# Patient Record
Sex: FEMALE | Race: WHITE | Employment: OTHER | URBAN - METROPOLITAN AREA
[De-identification: names, ages, dates, MRNs, and addresses within clinical notes are randomized per-mention and may not be internally consistent; named-entity substitution may affect disease eponyms.]

---

## 2024-04-05 ENCOUNTER — APPOINTMENT (OUTPATIENT)
Age: 74
DRG: 720 | End: 2024-04-05
Payer: COMMERCIAL

## 2024-04-05 ENCOUNTER — HOSPITAL ENCOUNTER (INPATIENT)
Age: 74
LOS: 4 days | Discharge: HOME | DRG: 720 | End: 2024-04-09
Attending: EMERGENCY MEDICINE | Admitting: HOSPITALIST
Payer: COMMERCIAL

## 2024-04-05 DIAGNOSIS — R79.89 ELEVATED TROPONIN: ICD-10-CM

## 2024-04-05 DIAGNOSIS — G89.29 OTHER CHRONIC PAIN: ICD-10-CM

## 2024-04-05 DIAGNOSIS — E87.8 ELECTROLYTE DISTURBANCE: ICD-10-CM

## 2024-04-05 DIAGNOSIS — I48.91 ATRIAL FIBRILLATION WITH RAPID VENTRICULAR RESPONSE (HCC): ICD-10-CM

## 2024-04-05 DIAGNOSIS — R40.4 TRANSIENT ALTERATION OF AWARENESS: ICD-10-CM

## 2024-04-05 DIAGNOSIS — A41.9 SEPTICEMIA (HCC): Primary | ICD-10-CM

## 2024-04-05 DIAGNOSIS — R79.89 ELEVATED BRAIN NATRIURETIC PEPTIDE (BNP) LEVEL: ICD-10-CM

## 2024-04-05 LAB
ALBUMIN SERPL-MCNC: 4.8 G/DL (ref 3.4–5)
ALBUMIN/GLOB SERPL: 1.1 {RATIO}
ALP SERPL-CCNC: 60 U/L (ref 40–129)
ALP SERPL-CCNC: ABNORMAL U/L (ref 40–129)
ALT SERPL-CCNC: 15 U/L (ref 10–40)
ALT SERPL-CCNC: ABNORMAL U/L (ref 10–40)
AMPHET UR QL SCN: NEGATIVE
ANION GAP SERPL CALCULATED.3IONS-SCNC: 12 MMOL/L (ref 3–16)
ANION GAP SERPL CALCULATED.3IONS-SCNC: 15 MMOL/L (ref 3–16)
ANION GAP SERPL CALCULATED.3IONS-SCNC: 23 MMOL/L (ref 3–16)
AST SERPL-CCNC: 31 U/L (ref 15–37)
AST SERPL-CCNC: ABNORMAL U/L (ref 15–37)
BACTERIA URNS QL MICRO: ABNORMAL
BARBITURATES UR QL SCN: NEGATIVE
BASOPHILS # BLD: 0.01 K/UL (ref 0–0.2)
BASOPHILS NFR BLD: 0 %
BENZODIAZ UR QL: POSITIVE
BILIRUB SERPL-MCNC: 0.6 MG/DL (ref 0–1)
BILIRUB UR QL STRIP: ABNORMAL
BNP SERPL-MCNC: ABNORMAL PG/ML (ref 0–124)
BUN SERPL-MCNC: 18 MG/DL (ref 7–20)
BUN SERPL-MCNC: 24 MG/DL (ref 7–20)
BUN SERPL-MCNC: 24 MG/DL (ref 7–20)
CALCIUM SERPL-MCNC: 10.3 MG/DL (ref 8.3–10.6)
CALCIUM SERPL-MCNC: 5.8 MG/DL (ref 8.3–10.6)
CALCIUM SERPL-MCNC: 9.2 MG/DL (ref 8.3–10.6)
CANNABINOIDS UR QL SCN: NEGATIVE
CHARACTER UR: ABNORMAL
CHLORIDE SERPL-SCNC: 106 MMOL/L (ref 99–110)
CHLORIDE SERPL-SCNC: 81 MMOL/L (ref 99–110)
CHLORIDE SERPL-SCNC: 91 MMOL/L (ref 99–110)
CLARITY UR: CLEAR
CO2 SERPL-SCNC: 16 MMOL/L (ref 21–32)
CO2 SERPL-SCNC: 19 MMOL/L (ref 21–32)
CO2 SERPL-SCNC: 22 MMOL/L (ref 21–32)
COCAINE UR QL SCN: NEGATIVE
COHGB MFR BLD: 0.7 % (ref 0–1.5)
COLOR UR: YELLOW
CREAT SERPL-MCNC: 0.4 MG/DL (ref 0.6–1.2)
CREAT SERPL-MCNC: 0.8 MG/DL (ref 0.6–1.2)
CREAT SERPL-MCNC: 1.1 MG/DL (ref 0.6–1.2)
EOSINOPHIL # BLD: 0 K/UL (ref 0–0.6)
EOSINOPHILS RELATIVE PERCENT: 0 %
EPI CELLS #/AREA URNS HPF: ABNORMAL /HPF
ERYTHROCYTE [DISTWIDTH] IN BLOOD BY AUTOMATED COUNT: 13.8 % (ref 12.4–15.4)
ETHANOLAMINE SERPL-MCNC: NORMAL MG/DL
FENTANYL UR QL: NEGATIVE
FLUAV AG SPEC QL: NEGATIVE
FLUBV AG SPEC QL: NEGATIVE
GAS FLOW.O2 O2 DELIVERY SYS: ABNORMAL L/MIN
GFR SERPL CREATININE-BSD FRML MDRD: 54 ML/MIN/1.73M2
GFR SERPL CREATININE-BSD FRML MDRD: 81 ML/MIN/1.73M2
GFR SERPL CREATININE-BSD FRML MDRD: >90 ML/MIN/1.73M2
GLUCOSE BLD-MCNC: 253 MG/DL (ref 70–99)
GLUCOSE SERPL-MCNC: 181 MG/DL (ref 70–99)
GLUCOSE SERPL-MCNC: 218 MG/DL (ref 70–99)
GLUCOSE SERPL-MCNC: 235 MG/DL (ref 70–99)
GLUCOSE UR STRIP-MCNC: NEGATIVE MG/DL
HCO3 ARTERIAL: 21.3 MMOL/L (ref 21–29)
HCT VFR BLD AUTO: 49.7 % (ref 36–48)
HGB BLD-MCNC: 16.7 G/DL (ref 12–16)
HGB BLDMV-MCNC: 15.5 G/DL (ref 12–16)
HGB UR QL STRIP.AUTO: ABNORMAL
IMM GRANULOCYTES # BLD AUTO: 0.06 K/UL (ref 0–0.5)
IMM GRANULOCYTES NFR BLD: 0 %
INR PPP: 0.9 (ref 0.9–1.2)
KETONES UR STRIP-MCNC: NEGATIVE MG/DL
LACTATE BLDV-SCNC: 1.5 MMOL/L (ref 0.4–1.9)
LACTATE BLDV-SCNC: 2.6 MMOL/L (ref 0.4–1.9)
LACTATE BLDV-SCNC: 2.8 MMOL/L (ref 0.4–1.9)
LACTATE BLDV-SCNC: 3.9 MMOL/L (ref 0.4–1.9)
LEUKOCYTE ESTERASE UR QL STRIP: ABNORMAL
LIPASE SERPL-CCNC: 24 U/L (ref 13–60)
LYMPHOCYTES NFR BLD: 0.77 K/UL (ref 1–5.1)
LYMPHOCYTES RELATIVE PERCENT: 5 %
MAGNESIUM SERPL-MCNC: 1.2 MG/DL (ref 1.8–2.4)
MCH RBC QN AUTO: 27.9 PG (ref 26–34)
MCHC RBC AUTO-ENTMCNC: 33.6 G/DL (ref 31–36)
MCV RBC AUTO: 83.1 FL (ref 80–100)
METHADONE UR QL: NEGATIVE
METHEMOGLOBIN: 0.1 % (ref 0–1.4)
MONOCYTES NFR BLD: 0.71 K/UL (ref 0–1.3)
MONOCYTES NFR BLD: 5 %
NEGATIVE BASE EXCESS, ART: 2.6 MMOL/L
NEUTROPHILS NFR BLD: 90 %
NEUTS SEG NFR BLD: 13.37 K/UL (ref 1.7–7.7)
NITRITE UR QL STRIP: NEGATIVE
O2 SAT, ARTERIAL: 97 % (ref 93–100)
OPIATES UR QL SCN: NEGATIVE
OXYCODONE UR QL SCN: POSITIVE
PCO2 ARTERIAL: 34.4 MMHG (ref 35–45)
PCP UR QL SCN: NEGATIVE
PH ARTERIAL: 7.41 (ref 7.35–7.45)
PH UR STRIP: 5 [PH]
PH UR STRIP: 6 [PH] (ref 5–8)
PLATELET # BLD AUTO: 398 K/UL (ref 135–450)
PMV BLD AUTO: 9.5 FL
PO2 ARTERIAL: 94.7 MMHG (ref 75–108)
POTASSIUM SERPL-SCNC: 2.9 MMOL/L (ref 3.5–5.1)
POTASSIUM SERPL-SCNC: 4.2 MMOL/L (ref 3.5–5.1)
POTASSIUM SERPL-SCNC: 4.5 MMOL/L (ref 3.5–5.1)
POTASSIUM SERPL-SCNC: ABNORMAL MMOL/L (ref 3.5–5.1)
POTASSIUM SERPL-SCNC: NORMAL MMOL/L (ref 3.5–5.1)
PROCALCITONIN SERPL-MCNC: 0.14 NG/ML (ref 0–0.15)
PROT SERPL-MCNC: 9.3 G/DL (ref 6.4–8.2)
PROT UR STRIP-MCNC: >=300 MG/DL
PROTHROMBIN TIME: 12.2 SEC (ref 11.9–14.9)
RBC # BLD AUTO: 5.98 M/UL (ref 4–5.2)
RBC #/AREA URNS HPF: ABNORMAL /HPF
SARS-COV-2 RDRP RESP QL NAA+PROBE: NOT DETECTED
SODIUM SERPL-SCNC: 123 MMOL/L (ref 136–145)
SODIUM SERPL-SCNC: 127 MMOL/L (ref 136–145)
SODIUM SERPL-SCNC: 133 MMOL/L (ref 136–145)
SODIUM UR-SCNC: <20 MMOL/L
SP GR UR STRIP: >1.03 (ref 1–1.03)
SPECIMEN DESCRIPTION: NORMAL
TEST INFORMATION: ABNORMAL
TROPONIN I SERPL HS-MCNC: 124 NG/L (ref 0–14)
TROPONIN I SERPL HS-MCNC: 157 NG/L (ref 0–14)
TROPONIN I SERPL HS-MCNC: 74 NG/L (ref 0–14)
UROBILINOGEN UR STRIP-ACNC: 0.2 EU/DL (ref 0–1)
WBC #/AREA URNS HPF: ABNORMAL /HPF
WBC OTHER # BLD: 14.9 K/UL (ref 4–11)

## 2024-04-05 PROCEDURE — 96367 TX/PROPH/DG ADDL SEQ IV INF: CPT

## 2024-04-05 PROCEDURE — 76705 ECHO EXAM OF ABDOMEN: CPT

## 2024-04-05 PROCEDURE — A4216 STERILE WATER/SALINE, 10 ML: HCPCS | Performed by: HOSPITALIST

## 2024-04-05 PROCEDURE — 96375 TX/PRO/DX INJ NEW DRUG ADDON: CPT

## 2024-04-05 PROCEDURE — 6370000000 HC RX 637 (ALT 250 FOR IP): Performed by: EMERGENCY MEDICINE

## 2024-04-05 PROCEDURE — 83690 ASSAY OF LIPASE: CPT

## 2024-04-05 PROCEDURE — 94640 AIRWAY INHALATION TREATMENT: CPT

## 2024-04-05 PROCEDURE — 84450 TRANSFERASE (AST) (SGOT): CPT

## 2024-04-05 PROCEDURE — C9113 INJ PANTOPRAZOLE SODIUM, VIA: HCPCS | Performed by: HOSPITALIST

## 2024-04-05 PROCEDURE — 36415 COLL VENOUS BLD VENIPUNCTURE: CPT

## 2024-04-05 PROCEDURE — 6360000002 HC RX W HCPCS: Performed by: HOSPITALIST

## 2024-04-05 PROCEDURE — 94761 N-INVAS EAR/PLS OXIMETRY MLT: CPT

## 2024-04-05 PROCEDURE — 84132 ASSAY OF SERUM POTASSIUM: CPT

## 2024-04-05 PROCEDURE — 6370000000 HC RX 637 (ALT 250 FOR IP): Performed by: HOSPITALIST

## 2024-04-05 PROCEDURE — 84145 PROCALCITONIN (PCT): CPT

## 2024-04-05 PROCEDURE — 83605 ASSAY OF LACTIC ACID: CPT

## 2024-04-05 PROCEDURE — 83930 ASSAY OF BLOOD OSMOLALITY: CPT

## 2024-04-05 PROCEDURE — 84075 ASSAY ALKALINE PHOSPHATASE: CPT

## 2024-04-05 PROCEDURE — 84460 ALANINE AMINO (ALT) (SGPT): CPT

## 2024-04-05 PROCEDURE — 6360000002 HC RX W HCPCS: Performed by: INTERNAL MEDICINE

## 2024-04-05 PROCEDURE — 84484 ASSAY OF TROPONIN QUANT: CPT

## 2024-04-05 PROCEDURE — 84300 ASSAY OF URINE SODIUM: CPT

## 2024-04-05 PROCEDURE — 87077 CULTURE AEROBIC IDENTIFY: CPT

## 2024-04-05 PROCEDURE — 80048 BASIC METABOLIC PNL TOTAL CA: CPT

## 2024-04-05 PROCEDURE — 87635 SARS-COV-2 COVID-19 AMP PRB: CPT

## 2024-04-05 PROCEDURE — 70450 CT HEAD/BRAIN W/O DYE: CPT

## 2024-04-05 PROCEDURE — 87086 URINE CULTURE/COLONY COUNT: CPT

## 2024-04-05 PROCEDURE — 83880 ASSAY OF NATRIURETIC PEPTIDE: CPT

## 2024-04-05 PROCEDURE — 83935 ASSAY OF URINE OSMOLALITY: CPT

## 2024-04-05 PROCEDURE — 2580000003 HC RX 258: Performed by: EMERGENCY MEDICINE

## 2024-04-05 PROCEDURE — 36600 WITHDRAWAL OF ARTERIAL BLOOD: CPT

## 2024-04-05 PROCEDURE — 81001 URINALYSIS AUTO W/SCOPE: CPT

## 2024-04-05 PROCEDURE — 2000000000 HC ICU R&B

## 2024-04-05 PROCEDURE — 71045 X-RAY EXAM CHEST 1 VIEW: CPT

## 2024-04-05 PROCEDURE — 93005 ELECTROCARDIOGRAM TRACING: CPT | Performed by: EMERGENCY MEDICINE

## 2024-04-05 PROCEDURE — 87040 BLOOD CULTURE FOR BACTERIA: CPT

## 2024-04-05 PROCEDURE — 96365 THER/PROPH/DIAG IV INF INIT: CPT

## 2024-04-05 PROCEDURE — 99291 CRITICAL CARE FIRST HOUR: CPT

## 2024-04-05 PROCEDURE — 80307 DRUG TEST PRSMV CHEM ANLYZR: CPT

## 2024-04-05 PROCEDURE — G0480 DRUG TEST DEF 1-7 CLASSES: HCPCS

## 2024-04-05 PROCEDURE — 2500000003 HC RX 250 WO HCPCS: Performed by: EMERGENCY MEDICINE

## 2024-04-05 PROCEDURE — 83735 ASSAY OF MAGNESIUM: CPT

## 2024-04-05 PROCEDURE — 2580000003 HC RX 258: Performed by: HOSPITALIST

## 2024-04-05 PROCEDURE — 82805 BLOOD GASES W/O2 SATURATION: CPT

## 2024-04-05 PROCEDURE — 2700000000 HC OXYGEN THERAPY PER DAY

## 2024-04-05 PROCEDURE — 82962 GLUCOSE BLOOD TEST: CPT

## 2024-04-05 PROCEDURE — 87804 INFLUENZA ASSAY W/OPTIC: CPT

## 2024-04-05 PROCEDURE — 93005 ELECTROCARDIOGRAM TRACING: CPT | Performed by: HOSPITALIST

## 2024-04-05 PROCEDURE — 6360000002 HC RX W HCPCS: Performed by: EMERGENCY MEDICINE

## 2024-04-05 PROCEDURE — 85025 COMPLETE CBC W/AUTO DIFF WBC: CPT

## 2024-04-05 PROCEDURE — 74177 CT ABD & PELVIS W/CONTRAST: CPT

## 2024-04-05 PROCEDURE — 6360000004 HC RX CONTRAST MEDICATION: Performed by: EMERGENCY MEDICINE

## 2024-04-05 PROCEDURE — 99223 1ST HOSP IP/OBS HIGH 75: CPT | Performed by: INTERNAL MEDICINE

## 2024-04-05 PROCEDURE — 85610 PROTHROMBIN TIME: CPT

## 2024-04-05 PROCEDURE — 80053 COMPREHEN METABOLIC PANEL: CPT

## 2024-04-05 RX ORDER — ASPIRIN 81 MG/1
81 TABLET ORAL DAILY
Status: DISCONTINUED | OUTPATIENT
Start: 2024-04-06 | End: 2024-04-09 | Stop reason: HOSPADM

## 2024-04-05 RX ORDER — ACETAMINOPHEN 325 MG/1
650 TABLET ORAL EVERY 6 HOURS PRN
Status: DISCONTINUED | OUTPATIENT
Start: 2024-04-05 | End: 2024-04-09 | Stop reason: HOSPADM

## 2024-04-05 RX ORDER — ONDANSETRON 4 MG/1
4 TABLET, ORALLY DISINTEGRATING ORAL EVERY 8 HOURS PRN
Status: DISCONTINUED | OUTPATIENT
Start: 2024-04-05 | End: 2024-04-09 | Stop reason: HOSPADM

## 2024-04-05 RX ORDER — LORAZEPAM 2 MG/ML
2 INJECTION INTRAMUSCULAR
Status: DISCONTINUED | OUTPATIENT
Start: 2024-04-05 | End: 2024-04-09 | Stop reason: HOSPADM

## 2024-04-05 RX ORDER — LISINOPRIL 10 MG/1
10 TABLET ORAL DAILY
Status: ON HOLD | COMMUNITY
End: 2024-04-08 | Stop reason: HOSPADM

## 2024-04-05 RX ORDER — LEVETIRACETAM 500 MG/1
500 TABLET ORAL 2 TIMES DAILY
Status: ON HOLD | COMMUNITY
End: 2024-04-08 | Stop reason: HOSPADM

## 2024-04-05 RX ORDER — CLONAZEPAM 0.5 MG/1
1 TABLET ORAL EVERY 8 HOURS
Status: DISCONTINUED | OUTPATIENT
Start: 2024-04-05 | End: 2024-04-05

## 2024-04-05 RX ORDER — ACETAMINOPHEN 650 MG/1
650 SUPPOSITORY RECTAL EVERY 6 HOURS PRN
Status: DISCONTINUED | OUTPATIENT
Start: 2024-04-05 | End: 2024-04-09 | Stop reason: HOSPADM

## 2024-04-05 RX ORDER — HYDROXYZINE HYDROCHLORIDE 25 MG/1
25 TABLET, FILM COATED ORAL 3 TIMES DAILY PRN
Status: ON HOLD | COMMUNITY
End: 2024-04-08 | Stop reason: HOSPADM

## 2024-04-05 RX ORDER — FERROUS SULFATE 325(65) MG
325 TABLET ORAL
Status: ON HOLD | COMMUNITY
End: 2024-04-08 | Stop reason: HOSPADM

## 2024-04-05 RX ORDER — POTASSIUM CHLORIDE 7.45 MG/ML
10 INJECTION INTRAVENOUS PRN
Status: DISCONTINUED | OUTPATIENT
Start: 2024-04-05 | End: 2024-04-09 | Stop reason: HOSPADM

## 2024-04-05 RX ORDER — SODIUM CHLORIDE 0.9 % (FLUSH) 0.9 %
5-40 SYRINGE (ML) INJECTION EVERY 12 HOURS SCHEDULED
Status: DISCONTINUED | OUTPATIENT
Start: 2024-04-05 | End: 2024-04-05

## 2024-04-05 RX ORDER — DILTIAZEM HYDROCHLORIDE 5 MG/ML
20 INJECTION INTRAVENOUS ONCE
Status: COMPLETED | OUTPATIENT
Start: 2024-04-05 | End: 2024-04-05

## 2024-04-05 RX ORDER — DEXTROSE MONOHYDRATE 100 MG/ML
INJECTION, SOLUTION INTRAVENOUS CONTINUOUS PRN
Status: DISCONTINUED | OUTPATIENT
Start: 2024-04-05 | End: 2024-04-09 | Stop reason: HOSPADM

## 2024-04-05 RX ORDER — BUPROPION HYDROCHLORIDE 150 MG/1
150 TABLET, EXTENDED RELEASE ORAL 2 TIMES DAILY
COMMUNITY

## 2024-04-05 RX ORDER — LORAZEPAM 1 MG/1
3 TABLET ORAL
Status: DISCONTINUED | OUTPATIENT
Start: 2024-04-05 | End: 2024-04-09 | Stop reason: HOSPADM

## 2024-04-05 RX ORDER — LORAZEPAM 1 MG/1
2 TABLET ORAL
Status: DISCONTINUED | OUTPATIENT
Start: 2024-04-05 | End: 2024-04-09 | Stop reason: HOSPADM

## 2024-04-05 RX ORDER — SODIUM CHLORIDE 0.9 % (FLUSH) 0.9 %
5-40 SYRINGE (ML) INJECTION EVERY 12 HOURS SCHEDULED
Status: DISCONTINUED | OUTPATIENT
Start: 2024-04-05 | End: 2024-04-05 | Stop reason: SDUPTHER

## 2024-04-05 RX ORDER — DILTIAZEM HYDROCHLORIDE 180 MG/1
180 CAPSULE, EXTENDED RELEASE ORAL DAILY
Status: ON HOLD | COMMUNITY
End: 2024-04-08 | Stop reason: HOSPADM

## 2024-04-05 RX ORDER — METOPROLOL TARTRATE 50 MG/1
50 TABLET, FILM COATED ORAL 2 TIMES DAILY
Status: ON HOLD | COMMUNITY
End: 2024-04-08

## 2024-04-05 RX ORDER — LEVETIRACETAM 500 MG/1
500 TABLET ORAL 2 TIMES DAILY
Status: DISCONTINUED | OUTPATIENT
Start: 2024-04-05 | End: 2024-04-09 | Stop reason: HOSPADM

## 2024-04-05 RX ORDER — DULOXETIN HYDROCHLORIDE 30 MG/1
30 CAPSULE, DELAYED RELEASE ORAL 2 TIMES DAILY
Status: ON HOLD | COMMUNITY
End: 2024-04-08 | Stop reason: HOSPADM

## 2024-04-05 RX ORDER — THIAMINE HYDROCHLORIDE 100 MG/ML
100 INJECTION, SOLUTION INTRAMUSCULAR; INTRAVENOUS ONCE
Status: COMPLETED | OUTPATIENT
Start: 2024-04-05 | End: 2024-04-05

## 2024-04-05 RX ORDER — 0.9 % SODIUM CHLORIDE 0.9 %
1000 INTRAVENOUS SOLUTION INTRAVENOUS ONCE
Status: DISCONTINUED | OUTPATIENT
Start: 2024-04-05 | End: 2024-04-05

## 2024-04-05 RX ORDER — ALENDRONATE SODIUM 70 MG/1
70 TABLET ORAL
Status: ON HOLD | COMMUNITY
End: 2024-04-08 | Stop reason: HOSPADM

## 2024-04-05 RX ORDER — SODIUM CHLORIDE 9 MG/ML
INJECTION, SOLUTION INTRAVENOUS CONTINUOUS
Status: DISCONTINUED | OUTPATIENT
Start: 2024-04-05 | End: 2024-04-05

## 2024-04-05 RX ORDER — DILTIAZEM HYDROCHLORIDE 180 MG/1
180 CAPSULE, COATED, EXTENDED RELEASE ORAL DAILY
Status: DISCONTINUED | OUTPATIENT
Start: 2024-04-05 | End: 2024-04-06

## 2024-04-05 RX ORDER — PANTOPRAZOLE SODIUM 40 MG/1
40 TABLET, DELAYED RELEASE ORAL DAILY
COMMUNITY

## 2024-04-05 RX ORDER — ALBUTEROL SULFATE 90 UG/1
2 AEROSOL, METERED RESPIRATORY (INHALATION) EVERY 6 HOURS PRN
Status: DISCONTINUED | OUTPATIENT
Start: 2024-04-05 | End: 2024-04-09 | Stop reason: HOSPADM

## 2024-04-05 RX ORDER — DULOXETIN HYDROCHLORIDE 30 MG/1
30 CAPSULE, DELAYED RELEASE ORAL 2 TIMES DAILY
Status: DISCONTINUED | OUTPATIENT
Start: 2024-04-05 | End: 2024-04-06

## 2024-04-05 RX ORDER — LORAZEPAM 1 MG/1
4 TABLET ORAL
Status: DISCONTINUED | OUTPATIENT
Start: 2024-04-05 | End: 2024-04-09 | Stop reason: HOSPADM

## 2024-04-05 RX ORDER — TIOTROPIUM BROMIDE 18 UG/1
18 CAPSULE ORAL; RESPIRATORY (INHALATION) DAILY
COMMUNITY

## 2024-04-05 RX ORDER — ASPIRIN 81 MG/1
81 TABLET ORAL DAILY
COMMUNITY

## 2024-04-05 RX ORDER — ONDANSETRON 2 MG/ML
4 INJECTION INTRAMUSCULAR; INTRAVENOUS EVERY 6 HOURS PRN
Status: DISCONTINUED | OUTPATIENT
Start: 2024-04-05 | End: 2024-04-09 | Stop reason: HOSPADM

## 2024-04-05 RX ORDER — ROPINIROLE 0.25 MG/1
0.25 TABLET, FILM COATED ORAL 2 TIMES DAILY
Status: ON HOLD | COMMUNITY
End: 2024-04-08 | Stop reason: HOSPADM

## 2024-04-05 RX ORDER — GUAIFENESIN 600 MG/1
1200 TABLET, EXTENDED RELEASE ORAL 2 TIMES DAILY
Status: ON HOLD | COMMUNITY
End: 2024-04-08 | Stop reason: HOSPADM

## 2024-04-05 RX ORDER — MAGNESIUM HYDROXIDE/ALUMINUM HYDROXICE/SIMETHICONE 120; 1200; 1200 MG/30ML; MG/30ML; MG/30ML
30 SUSPENSION ORAL EVERY 6 HOURS PRN
Status: DISCONTINUED | OUTPATIENT
Start: 2024-04-05 | End: 2024-04-09 | Stop reason: HOSPADM

## 2024-04-05 RX ORDER — OXYCODONE HYDROCHLORIDE 5 MG/1
5 TABLET ORAL EVERY 4 HOURS PRN
Status: DISCONTINUED | OUTPATIENT
Start: 2024-04-05 | End: 2024-04-09 | Stop reason: HOSPADM

## 2024-04-05 RX ORDER — 0.9 % SODIUM CHLORIDE 0.9 %
30 INTRAVENOUS SOLUTION INTRAVENOUS ONCE
Status: DISCONTINUED | OUTPATIENT
Start: 2024-04-05 | End: 2024-04-05

## 2024-04-05 RX ORDER — CLONAZEPAM 1 MG/1
1 TABLET ORAL EVERY 8 HOURS
Status: DISCONTINUED | OUTPATIENT
Start: 2024-04-05 | End: 2024-04-09 | Stop reason: HOSPADM

## 2024-04-05 RX ORDER — IPRATROPIUM BROMIDE AND ALBUTEROL SULFATE 2.5; .5 MG/3ML; MG/3ML
1 SOLUTION RESPIRATORY (INHALATION)
COMMUNITY
End: 2024-04-05

## 2024-04-05 RX ORDER — DILTIAZEM HYDROCHLORIDE 180 MG/1
180 CAPSULE, EXTENDED RELEASE ORAL DAILY
Status: DISCONTINUED | OUTPATIENT
Start: 2024-04-05 | End: 2024-04-05 | Stop reason: RX

## 2024-04-05 RX ORDER — ROSUVASTATIN CALCIUM 40 MG/1
40 TABLET, COATED ORAL NIGHTLY
Status: DISCONTINUED | OUTPATIENT
Start: 2024-04-05 | End: 2024-04-09 | Stop reason: HOSPADM

## 2024-04-05 RX ORDER — MAGNESIUM SULFATE IN WATER 40 MG/ML
2000 INJECTION, SOLUTION INTRAVENOUS PRN
Status: DISCONTINUED | OUTPATIENT
Start: 2024-04-05 | End: 2024-04-09 | Stop reason: HOSPADM

## 2024-04-05 RX ORDER — SODIUM CHLORIDE 9 MG/ML
INJECTION, SOLUTION INTRAVENOUS PRN
Status: DISCONTINUED | OUTPATIENT
Start: 2024-04-05 | End: 2024-04-05 | Stop reason: SDUPTHER

## 2024-04-05 RX ORDER — LISINOPRIL 10 MG/1
10 TABLET ORAL DAILY
Status: DISCONTINUED | OUTPATIENT
Start: 2024-04-06 | End: 2024-04-06

## 2024-04-05 RX ORDER — ERGOCALCIFEROL 1.25 MG/1
50000 CAPSULE ORAL WEEKLY
COMMUNITY

## 2024-04-05 RX ORDER — SODIUM CHLORIDE 0.9 % (FLUSH) 0.9 %
5-40 SYRINGE (ML) INJECTION PRN
Status: DISCONTINUED | OUTPATIENT
Start: 2024-04-05 | End: 2024-04-09 | Stop reason: HOSPADM

## 2024-04-05 RX ORDER — POTASSIUM CHLORIDE 20 MEQ/1
40 TABLET, EXTENDED RELEASE ORAL PRN
Status: DISCONTINUED | OUTPATIENT
Start: 2024-04-05 | End: 2024-04-09 | Stop reason: HOSPADM

## 2024-04-05 RX ORDER — METOPROLOL TARTRATE 50 MG/1
50 TABLET, FILM COATED ORAL 2 TIMES DAILY
Status: DISCONTINUED | OUTPATIENT
Start: 2024-04-05 | End: 2024-04-06

## 2024-04-05 RX ORDER — ACETAMINOPHEN 325 MG/1
650 TABLET ORAL EVERY 6 HOURS PRN
Status: DISCONTINUED | OUTPATIENT
Start: 2024-04-05 | End: 2024-04-05 | Stop reason: SDUPTHER

## 2024-04-05 RX ORDER — LANOLIN ALCOHOL/MO/W.PET/CERES
3 CREAM (GRAM) TOPICAL NIGHTLY PRN
Status: DISCONTINUED | OUTPATIENT
Start: 2024-04-05 | End: 2024-04-09 | Stop reason: HOSPADM

## 2024-04-05 RX ORDER — MAGNESIUM SULFATE IN WATER 40 MG/ML
2000 INJECTION, SOLUTION INTRAVENOUS ONCE
Status: COMPLETED | OUTPATIENT
Start: 2024-04-05 | End: 2024-04-05

## 2024-04-05 RX ORDER — LORAZEPAM 2 MG/ML
1 INJECTION INTRAMUSCULAR
Status: DISCONTINUED | OUTPATIENT
Start: 2024-04-05 | End: 2024-04-09 | Stop reason: HOSPADM

## 2024-04-05 RX ORDER — SODIUM CHLORIDE 0.9 % (FLUSH) 0.9 %
5-40 SYRINGE (ML) INJECTION PRN
Status: DISCONTINUED | OUTPATIENT
Start: 2024-04-05 | End: 2024-04-05 | Stop reason: SDUPTHER

## 2024-04-05 RX ORDER — 0.9 % SODIUM CHLORIDE 0.9 %
15 INTRAVENOUS SOLUTION INTRAVENOUS ONCE
Status: DISCONTINUED | OUTPATIENT
Start: 2024-04-05 | End: 2024-04-05

## 2024-04-05 RX ORDER — GLUCAGON 1 MG/ML
1 KIT INJECTION PRN
Status: DISCONTINUED | OUTPATIENT
Start: 2024-04-05 | End: 2024-04-09 | Stop reason: HOSPADM

## 2024-04-05 RX ORDER — SENNOSIDES A AND B 8.6 MG/1
1 TABLET, FILM COATED ORAL 2 TIMES DAILY PRN
Status: DISCONTINUED | OUTPATIENT
Start: 2024-04-05 | End: 2024-04-09 | Stop reason: HOSPADM

## 2024-04-05 RX ORDER — GABAPENTIN 300 MG/1
300 CAPSULE ORAL EVERY MORNING
Status: DISCONTINUED | OUTPATIENT
Start: 2024-04-06 | End: 2024-04-09 | Stop reason: HOSPADM

## 2024-04-05 RX ORDER — DONEPEZIL HYDROCHLORIDE 5 MG/1
5 TABLET, FILM COATED ORAL NIGHTLY
Status: ON HOLD | COMMUNITY
End: 2024-04-08 | Stop reason: HOSPADM

## 2024-04-05 RX ORDER — LANOLIN ALCOHOL/MO/W.PET/CERES
400 CREAM (GRAM) TOPICAL DAILY
Status: ON HOLD | COMMUNITY
End: 2024-04-08 | Stop reason: HOSPADM

## 2024-04-05 RX ORDER — DIGOXIN 125 MCG
125 TABLET ORAL EVERY OTHER DAY
Status: DISCONTINUED | OUTPATIENT
Start: 2024-04-05 | End: 2024-04-06

## 2024-04-05 RX ORDER — BUTALBITAL/ACETAMINOPHEN 50MG-325MG
1 TABLET ORAL EVERY 4 HOURS PRN
COMMUNITY

## 2024-04-05 RX ORDER — OXYCODONE HYDROCHLORIDE AND ACETAMINOPHEN 5; 325 MG/1; MG/1
1 TABLET ORAL EVERY 8 HOURS PRN
Status: ON HOLD | COMMUNITY
End: 2024-04-08 | Stop reason: HOSPADM

## 2024-04-05 RX ORDER — INSULIN GLARGINE 100 [IU]/ML
34 INJECTION, SOLUTION SUBCUTANEOUS DAILY
COMMUNITY

## 2024-04-05 RX ORDER — ROSUVASTATIN CALCIUM 40 MG/1
40 TABLET, COATED ORAL EVERY EVENING
COMMUNITY

## 2024-04-05 RX ORDER — SODIUM CHLORIDE 0.9 % (FLUSH) 0.9 %
5-40 SYRINGE (ML) INJECTION EVERY 12 HOURS SCHEDULED
Status: DISCONTINUED | OUTPATIENT
Start: 2024-04-05 | End: 2024-04-09 | Stop reason: HOSPADM

## 2024-04-05 RX ORDER — INSULIN GLARGINE 100 [IU]/ML
34 INJECTION, SOLUTION SUBCUTANEOUS DAILY
Status: DISCONTINUED | OUTPATIENT
Start: 2024-04-05 | End: 2024-04-09 | Stop reason: HOSPADM

## 2024-04-05 RX ORDER — LISINOPRIL 10 MG/1
10 TABLET ORAL DAILY
Status: DISCONTINUED | OUTPATIENT
Start: 2024-04-05 | End: 2024-04-05

## 2024-04-05 RX ORDER — METHYLPREDNISOLONE SODIUM SUCCINATE 125 MG/2ML
80 INJECTION, POWDER, LYOPHILIZED, FOR SOLUTION INTRAMUSCULAR; INTRAVENOUS ONCE
Status: COMPLETED | OUTPATIENT
Start: 2024-04-05 | End: 2024-04-05

## 2024-04-05 RX ORDER — CLONAZEPAM 1 MG/1
1 TABLET ORAL 3 TIMES DAILY PRN
COMMUNITY

## 2024-04-05 RX ORDER — PANTOPRAZOLE SODIUM 40 MG/1
40 TABLET, DELAYED RELEASE ORAL DAILY
Status: DISCONTINUED | OUTPATIENT
Start: 2024-04-06 | End: 2024-04-05

## 2024-04-05 RX ORDER — LORAZEPAM 1 MG/1
1 TABLET ORAL
Status: DISCONTINUED | OUTPATIENT
Start: 2024-04-05 | End: 2024-04-09 | Stop reason: HOSPADM

## 2024-04-05 RX ORDER — IPRATROPIUM BROMIDE AND ALBUTEROL SULFATE 2.5; .5 MG/3ML; MG/3ML
1 SOLUTION RESPIRATORY (INHALATION) ONCE
Status: COMPLETED | OUTPATIENT
Start: 2024-04-05 | End: 2024-04-05

## 2024-04-05 RX ORDER — M-VIT,TX,IRON,MINS/CALC/FOLIC 27MG-0.4MG
1 TABLET ORAL DAILY
Status: DISCONTINUED | OUTPATIENT
Start: 2024-04-06 | End: 2024-04-09 | Stop reason: HOSPADM

## 2024-04-05 RX ORDER — ROPINIROLE 0.25 MG/1
0.25 TABLET, FILM COATED ORAL 2 TIMES DAILY
Status: DISCONTINUED | OUTPATIENT
Start: 2024-04-05 | End: 2024-04-05

## 2024-04-05 RX ORDER — METFORMIN HYDROCHLORIDE 500 MG/1
500 TABLET, EXTENDED RELEASE ORAL 2 TIMES DAILY
COMMUNITY

## 2024-04-05 RX ORDER — ENOXAPARIN SODIUM 100 MG/ML
40 INJECTION SUBCUTANEOUS DAILY
Status: DISCONTINUED | OUTPATIENT
Start: 2024-04-05 | End: 2024-04-09 | Stop reason: HOSPADM

## 2024-04-05 RX ORDER — GABAPENTIN 600 MG/1
600 TABLET ORAL EVERY EVENING
COMMUNITY

## 2024-04-05 RX ORDER — POLYETHYLENE GLYCOL 3350 17 G/17G
17 POWDER, FOR SOLUTION ORAL DAILY
Status: DISCONTINUED | OUTPATIENT
Start: 2024-04-05 | End: 2024-04-09 | Stop reason: HOSPADM

## 2024-04-05 RX ORDER — DIGOXIN 125 MCG
125 TABLET ORAL EVERY OTHER DAY
Status: ON HOLD | COMMUNITY
End: 2024-04-08 | Stop reason: HOSPADM

## 2024-04-05 RX ORDER — GABAPENTIN 300 MG/1
300 CAPSULE ORAL EVERY MORNING
COMMUNITY

## 2024-04-05 RX ORDER — LORAZEPAM 2 MG/ML
3 INJECTION INTRAMUSCULAR
Status: DISCONTINUED | OUTPATIENT
Start: 2024-04-05 | End: 2024-04-09 | Stop reason: HOSPADM

## 2024-04-05 RX ORDER — BUPROPION HYDROCHLORIDE 150 MG/1
150 TABLET, EXTENDED RELEASE ORAL 2 TIMES DAILY
Status: DISCONTINUED | OUTPATIENT
Start: 2024-04-05 | End: 2024-04-09 | Stop reason: HOSPADM

## 2024-04-05 RX ORDER — ALBUTEROL SULFATE 90 UG/1
2 AEROSOL, METERED RESPIRATORY (INHALATION) EVERY 6 HOURS PRN
COMMUNITY

## 2024-04-05 RX ORDER — IPRATROPIUM BROMIDE AND ALBUTEROL SULFATE 2.5; .5 MG/3ML; MG/3ML
1 SOLUTION RESPIRATORY (INHALATION) EVERY 4 HOURS PRN
Status: DISCONTINUED | OUTPATIENT
Start: 2024-04-05 | End: 2024-04-09 | Stop reason: HOSPADM

## 2024-04-05 RX ORDER — M-VIT,TX,IRON,MINS/CALC/FOLIC 27MG-0.4MG
1 TABLET ORAL DAILY
COMMUNITY

## 2024-04-05 RX ORDER — SODIUM CHLORIDE 9 MG/ML
INJECTION, SOLUTION INTRAVENOUS PRN
Status: DISCONTINUED | OUTPATIENT
Start: 2024-04-05 | End: 2024-04-09 | Stop reason: HOSPADM

## 2024-04-05 RX ORDER — LANOLIN ALCOHOL/MO/W.PET/CERES
100 CREAM (GRAM) TOPICAL DAILY
Status: DISCONTINUED | OUTPATIENT
Start: 2024-04-05 | End: 2024-04-05

## 2024-04-05 RX ORDER — LORAZEPAM 2 MG/ML
4 INJECTION INTRAMUSCULAR
Status: DISCONTINUED | OUTPATIENT
Start: 2024-04-05 | End: 2024-04-09 | Stop reason: HOSPADM

## 2024-04-05 RX ORDER — ACETAMINOPHEN 650 MG/1
650 SUPPOSITORY RECTAL EVERY 6 HOURS PRN
Status: DISCONTINUED | OUTPATIENT
Start: 2024-04-05 | End: 2024-04-05 | Stop reason: SDUPTHER

## 2024-04-05 RX ORDER — INSULIN LISPRO 100 [IU]/ML
INJECTION, SOLUTION INTRAVENOUS; SUBCUTANEOUS
COMMUNITY
End: 2024-04-05

## 2024-04-05 RX ORDER — GABAPENTIN 300 MG/1
600 CAPSULE ORAL EVERY EVENING
Status: DISCONTINUED | OUTPATIENT
Start: 2024-04-05 | End: 2024-04-09 | Stop reason: HOSPADM

## 2024-04-05 RX ORDER — LANOLIN ALCOHOL/MO/W.PET/CERES
200 CREAM (GRAM) TOPICAL DAILY
Status: DISCONTINUED | OUTPATIENT
Start: 2024-04-06 | End: 2024-04-09 | Stop reason: HOSPADM

## 2024-04-05 RX ADMIN — GABAPENTIN 600 MG: 300 CAPSULE ORAL at 18:39

## 2024-04-05 RX ADMIN — SODIUM CHLORIDE, PRESERVATIVE FREE 10 ML: 5 INJECTION INTRAVENOUS at 20:56

## 2024-04-05 RX ADMIN — MAGNESIUM SULFATE HEPTAHYDRATE 2000 MG: 40 INJECTION, SOLUTION INTRAVENOUS at 17:35

## 2024-04-05 RX ADMIN — INSULIN GLARGINE 34 UNITS: 100 INJECTION, SOLUTION SUBCUTANEOUS at 21:39

## 2024-04-05 RX ADMIN — SODIUM CHLORIDE: 9 INJECTION, SOLUTION INTRAVENOUS at 17:25

## 2024-04-05 RX ADMIN — CEFEPIME 2000 MG: 2 INJECTION, POWDER, FOR SOLUTION INTRAVENOUS at 13:58

## 2024-04-05 RX ADMIN — LEVETIRACETAM 500 MG: 500 TABLET, FILM COATED ORAL at 20:42

## 2024-04-05 RX ADMIN — DILTIAZEM HYDROCHLORIDE 180 MG: 180 CAPSULE, COATED, EXTENDED RELEASE ORAL at 20:55

## 2024-04-05 RX ADMIN — Medication 100 MG: at 18:39

## 2024-04-05 RX ADMIN — METHYLPREDNISOLONE SODIUM SUCCINATE 80 MG: 125 INJECTION INTRAMUSCULAR; INTRAVENOUS at 13:55

## 2024-04-05 RX ADMIN — IPRATROPIUM BROMIDE AND ALBUTEROL SULFATE 1 DOSE: 2.5; .5 SOLUTION RESPIRATORY (INHALATION) at 13:21

## 2024-04-05 RX ADMIN — METOPROLOL TARTRATE 50 MG: 50 TABLET ORAL at 20:42

## 2024-04-05 RX ADMIN — DULOXETINE HYDROCHLORIDE 30 MG: 30 CAPSULE, DELAYED RELEASE ORAL at 20:42

## 2024-04-05 RX ADMIN — IOPAMIDOL 75 ML: 755 INJECTION, SOLUTION INTRAVENOUS at 14:40

## 2024-04-05 RX ADMIN — DILTIAZEM HYDROCHLORIDE 20 MG: 5 INJECTION, SOLUTION INTRAVENOUS at 13:55

## 2024-04-05 RX ADMIN — DIGOXIN 125 MCG: 125 TABLET ORAL at 20:55

## 2024-04-05 RX ADMIN — BUPROPION HYDROCHLORIDE 150 MG: 150 TABLET, EXTENDED RELEASE ORAL at 20:42

## 2024-04-05 RX ADMIN — CLONAZEPAM 1 MG: 1 TABLET ORAL at 20:26

## 2024-04-05 RX ADMIN — ROSUVASTATIN 40 MG: 40 TABLET, FILM COATED ORAL at 20:42

## 2024-04-05 RX ADMIN — ENOXAPARIN SODIUM 40 MG: 100 INJECTION SUBCUTANEOUS at 18:39

## 2024-04-05 RX ADMIN — POLYETHYLENE GLYCOL 3350 17 G: 17 POWDER, FOR SOLUTION ORAL at 18:40

## 2024-04-05 RX ADMIN — CEFTRIAXONE SODIUM 2000 MG: 2 INJECTION, POWDER, FOR SOLUTION INTRAMUSCULAR; INTRAVENOUS at 21:00

## 2024-04-05 RX ADMIN — PANTOPRAZOLE SODIUM 40 MG: 40 INJECTION, POWDER, FOR SOLUTION INTRAVENOUS at 20:56

## 2024-04-05 RX ADMIN — VANCOMYCIN HYDROCHLORIDE 1500 MG: 1.5 INJECTION, POWDER, LYOPHILIZED, FOR SOLUTION INTRAVENOUS at 14:56

## 2024-04-05 RX ADMIN — THIAMINE HYDROCHLORIDE 100 MG: 100 INJECTION, SOLUTION INTRAMUSCULAR; INTRAVENOUS at 20:42

## 2024-04-05 RX ADMIN — OXYCODONE 5 MG: 5 TABLET ORAL at 17:29

## 2024-04-05 ASSESSMENT — PAIN SCALES - GENERAL
PAINLEVEL_OUTOF10: 10
PAINLEVEL_OUTOF10: 10
PAINLEVEL_OUTOF10: 8
PAINLEVEL_OUTOF10: 0

## 2024-04-05 ASSESSMENT — PAIN DESCRIPTION - PAIN TYPE
TYPE: ACUTE PAIN
TYPE: ACUTE PAIN

## 2024-04-05 ASSESSMENT — PAIN SCALES - WONG BAKER
WONGBAKER_NUMERICALRESPONSE: NO HURT
WONGBAKER_NUMERICALRESPONSE: HURTS A LITTLE BIT
WONGBAKER_NUMERICALRESPONSE: NO HURT

## 2024-04-05 ASSESSMENT — PAIN DESCRIPTION - ONSET
ONSET: ON-GOING
ONSET: ON-GOING

## 2024-04-05 ASSESSMENT — PAIN DESCRIPTION - LOCATION
LOCATION: GENERALIZED
LOCATION_2: NECK
LOCATION: ABDOMEN
LOCATION: ABDOMEN

## 2024-04-05 ASSESSMENT — PAIN DESCRIPTION - DESCRIPTORS
DESCRIPTORS: PRESSURE
DESCRIPTORS: DISCOMFORT
DESCRIPTORS_2: ACHING
DESCRIPTORS: PRESSURE

## 2024-04-05 ASSESSMENT — LIFESTYLE VARIABLES
HOW OFTEN DO YOU HAVE A DRINK CONTAINING ALCOHOL: PATIENT DECLINED
HOW MANY STANDARD DRINKS CONTAINING ALCOHOL DO YOU HAVE ON A TYPICAL DAY: PATIENT DECLINED

## 2024-04-05 ASSESSMENT — PAIN - FUNCTIONAL ASSESSMENT
PAIN_FUNCTIONAL_ASSESSMENT: PREVENTS OR INTERFERES SOME ACTIVE ACTIVITIES AND ADLS
PAIN_FUNCTIONAL_ASSESSMENT: PREVENTS OR INTERFERES SOME ACTIVE ACTIVITIES AND ADLS
PAIN_FUNCTIONAL_ASSESSMENT_SITE2: PREVENTS OR INTERFERES SOME ACTIVE ACTIVITIES AND ADLS
PAIN_FUNCTIONAL_ASSESSMENT: 0-10

## 2024-04-05 ASSESSMENT — PAIN DESCRIPTION - FREQUENCY
FREQUENCY: CONTINUOUS
FREQUENCY: CONTINUOUS

## 2024-04-05 ASSESSMENT — PAIN DESCRIPTION - ORIENTATION
ORIENTATION: MID
ORIENTATION_2: POSTERIOR
ORIENTATION: MID

## 2024-04-05 ASSESSMENT — PAIN DESCRIPTION - INTENSITY: RATING_2: 10

## 2024-04-05 NOTE — ED NOTES
Attempted to perform SBIRT screening. Pt states \"I do drink alcohol and I know I drink too much. It got worse because I haven't been taking my anxiety medications and I am working on getting them adjusted. I honestly do not remember how much I drink.\"  
ESTRADA Hadely at bedside drawing recollect of K+. Specimen obtained and labeled appropriately and sent to lab immediately.   
Lab called and states the repeat green top blood was hemolyzed and specimen is rejected. Lab states they will come to department to draw the green top tube for the potassium level.    
Phlebotomist at bedside to obtain recollect K+ and repeat lactic.   
Pts family member She at bedside.   
RN spoke to pts family member at providers request to determine pts wishes and to help direct plan of care. Spoke to She. She informed RN that the pt does wish to be a full code and she is on her way to [facility] at this time.   
Received call from lab that K+ was hemolyzed and needed to be recollected. Lab also informed RN that they are rejecting ABG d/t specimen not being labeled. RN informed lab that the specimen was labeled and SHAZIA Torres placed the label on the specimen and it was sent to the lab appropriately. RN also informed lab that RN was at bedside when the specimen was drawn by RT and that the specimen needs to be ran to prevent another arterial puncture. Lab verbalized understanding and agreed to run the specimen accordingly, but informed RN that the K+ needs to be recollected.   
Received call from lab that the recollected K+ was hemolyzed and needs to be recollected again. RN informed lab that a phlebotomist needs to be sent to the ED to obtain the K+.  
US at bedside.   
Gravity, UA >1.030 (*)     Urine Hgb SMALL (*)     Protein, UA >=300 (*)     Leukocyte Esterase, Urine TRACE (*)     All other components within normal limits   MAGNESIUM - Abnormal; Notable for the following components:    Magnesium 1.2 (*)     All other components within normal limits   MICROSCOPIC URINALYSIS - Abnormal; Notable for the following components:    WBC, UA 21 TO 50 (*)     RBC, UA 10 TO 20 (*)     Bacteria, UA 3+ (*)     Other Observations UA Urine Reflexed to Culture (*)     All other components within normal limits       Background  Allergies:   Allergies   Allergen Reactions    Sulfa Antibiotics Anxiety     History:   Past Medical History:   Diagnosis Date    A-fib ([facility])     COPD (chronic obstructive pulmonary disease) ([facility])     Diabetes mellitus ([facility])     Hypertension        Assessment  Vitals: MEWS Score: 3  Level of Consciousness: Alert (0)   Vitals:    04/05/24 1415 04/05/24 1430 04/05/24 1500 04/05/24 1515   BP: 109/65 116/80 121/67 125/77   Pulse: 78 81 93 97   Resp: 22 23 20 16   Temp:       TempSrc:       SpO2: 98% 97% 98% 100%   Weight:       Height:          O2 Flow Rate: O2 Flow Rate (L/min): 3 L/min  O2 Device: O2 Device: ETCO2 nasal cannula  Cardiac Rhythm:  Atrial Fibrillation   Cultures: Cultures:Blood, Urine, Flu, and Covid  NIH Score: NIH NIH Stroke Scale  Interval: Baseline  Level of Consciousness (1a): Alert  LOC Questions (1b): Answers both correctly  LOC Commands (1c): Performs both tasks correctly  Best Gaze (2): Normal  Visual (3): No visual loss  Facial Palsy (4): Normal symmetrical movement  Motor Arm, Left (5a): No drift  Motor Arm, Right (5b): No drift  Motor Leg, Left (6a): No drift  Motor Leg, Right (6b): No drift  Limb Ataxia (7): Absent  Sensory (8): Normal  Best Language (9): No aphasia  Dysarthria (10): Normal  Extinction and Inattention (11): No abnormality  Total: 0   Active LDA's:   Peripheral IV 04/05/24 Distal;Left;Posterior Forearm (Active)   Site Assessment

## 2024-04-05 NOTE — ED PROVIDER NOTES
Record.        FINAL IMPRESSION    1. Septicemia (HCC)    2. Atrial fibrillation with rapid ventricular response (HCC)    3. Electrolyte disturbance    4. Transient alteration of awareness    5. Elevated troponin    6. Elevated brain natriuretic peptide (BNP) level           DISPOSITION/PLAN:     Pt admitted to hospital for further workup.  3:43 PM            PATIENT REFERRED TO:   No follow-up provider specified.     DISCHARGE MEDICATIONS:   New Prescriptions    No medications on file        DISCONTINUED MEDICATIONS:   Discontinued Medications    INSULIN LISPRO (HUMALOG) 100 UNIT/ML SOLN INJECTION VIAL    Inject into the skin 3 times daily (with meals) Per sliding scale    IPRATROPIUM 0.5 MG-ALBUTEROL 2.5 MG (DUONEB) 0.5-2.5 (3) MG/3ML SOLN NEBULIZER SOLUTION    Inhale 3 mLs into the lungs every 4 hours (while awake)    MAGNESIUM 400 MG CAPS    Take by mouth              (Please note that portions of this note were completed with a voice recognition program.  Efforts were made to edit the dictations but occasionally words are mis-transcribed.)       Edin Vital MD (electronically signed)             Edin Vital MD  04/05/24 5047

## 2024-04-05 NOTE — H&P
Doppler images were evaluated. COMPARISON: None. FINDINGS: The liver is of normal size and echogenicity. No intrahepatic ductal dilation. Normal hepatopetal flow to the portal vein.  The gallbladder demonstrates multiple calculi. No significant gallbladder wall thickening. No pain over the gallbladder.. The common bile duct measures 3 mm. Limited evaluation of the pancreas demonstrates no abnormality. The right kidney measures 10.3 cm in long axis. No mass lesion or hydronephrosis. Limited evaluation of the inferior vena cava and aorta demonstrate no significant abnormality. No ascites.     1. Cholelithiasis without significant gallbladder wall thickening or pain over the gallbladder. Cholecystitis is considered unlikely. Clinical correlation recommended.    CT ABDOMEN PELVIS W IV CONTRAST Additional Contrast? None    Result Date: 4/5/2024  EXAM:  CT ABDOMEN PELVIS W IV CONTRAST INDICATION: generalized abd pain, increaded WBC, septic COMPARISON: None. TECHNIQUE: Axial CT imaging obtained from lung bases through pelvis. Axial images and multiplanar reformatted images are provided for review.  Individualized dose optimization technique was used in order to meet ALARA standards for radiation dose reduction.  In addition to vendor specific dose reduction algorithms, the dose reduction techniques vary based on the specific scanner utilized but frequently include automated exposure control, adjustment of the mA and/or kV according to patient size, and use of iterative reconstruction technique. IV Contrast: 80 mL Isovue-370 Oral Contrast: None. FINDINGS: LUNG BASES: Right middle lobe airspace disease, patchy atelectasis or pneumonia. Basilar bronchovascular crowding, atelectasis. LIVER: Normal. GALLBLADDER AND BILIARY TREE: Distended gallbladder with intraluminal stones, cholelithiasis. No pericholecystic fluid no intra- or extrahepatic biliary dilatation. PANCREAS: Normal. SPLEEN: Normal. ADRENAL GLANDS: Normal. KIDNEYS

## 2024-04-06 PROBLEM — E87.1 HYPONATREMIA: Status: ACTIVE | Noted: 2024-04-06

## 2024-04-06 LAB
ANION GAP SERPL CALCULATED.3IONS-SCNC: 12 MMOL/L (ref 3–16)
ANION GAP SERPL CALCULATED.3IONS-SCNC: 13 MMOL/L (ref 3–16)
ANION GAP SERPL CALCULATED.3IONS-SCNC: 13 MMOL/L (ref 3–16)
ANION GAP SERPL CALCULATED.3IONS-SCNC: 14 MMOL/L (ref 3–16)
BACTERIA URNS QL MICRO: ABNORMAL
BASOPHILS # BLD: 0.01 K/UL (ref 0–0.2)
BASOPHILS NFR BLD: 0 %
BILIRUB UR QL STRIP: NEGATIVE
BNP SERPL-MCNC: ABNORMAL PG/ML (ref 0–124)
BUN SERPL-MCNC: 27 MG/DL (ref 7–20)
BUN SERPL-MCNC: 29 MG/DL (ref 7–20)
BUN SERPL-MCNC: 33 MG/DL (ref 7–20)
BUN SERPL-MCNC: 35 MG/DL (ref 7–20)
CALCIUM SERPL-MCNC: 8.4 MG/DL (ref 8.3–10.6)
CALCIUM SERPL-MCNC: 8.4 MG/DL (ref 8.3–10.6)
CALCIUM SERPL-MCNC: 8.9 MG/DL (ref 8.3–10.6)
CALCIUM SERPL-MCNC: 9 MG/DL (ref 8.3–10.6)
CASTS #/AREA URNS LPF: ABNORMAL /LPF
CHLORIDE SERPL-SCNC: 92 MMOL/L (ref 99–110)
CHLORIDE SERPL-SCNC: 92 MMOL/L (ref 99–110)
CHLORIDE SERPL-SCNC: 93 MMOL/L (ref 99–110)
CHLORIDE SERPL-SCNC: 95 MMOL/L (ref 99–110)
CLARITY UR: CLEAR
CO2 SERPL-SCNC: 18 MMOL/L (ref 21–32)
CO2 SERPL-SCNC: 20 MMOL/L (ref 21–32)
CO2 SERPL-SCNC: 21 MMOL/L (ref 21–32)
CO2 SERPL-SCNC: 22 MMOL/L (ref 21–32)
COLOR UR: YELLOW
CREAT SERPL-MCNC: 0.8 MG/DL (ref 0.6–1.2)
CREAT SERPL-MCNC: 1 MG/DL (ref 0.6–1.2)
CREAT SERPL-MCNC: 1.1 MG/DL (ref 0.6–1.2)
CREAT SERPL-MCNC: 1.1 MG/DL (ref 0.6–1.2)
DATE LAST DOSE: ABNORMAL
DIGOXIN DOSE TIME: ABNORMAL
DIGOXIN DOSE: ABNORMAL MG
DIGOXIN SERPL-MCNC: 0.5 NG/ML (ref 0.8–2)
EOSINOPHIL # BLD: 0.01 K/UL (ref 0–0.6)
EOSINOPHILS RELATIVE PERCENT: 0 %
EPI CELLS #/AREA URNS HPF: ABNORMAL /HPF
ERYTHROCYTE [DISTWIDTH] IN BLOOD BY AUTOMATED COUNT: 13.7 % (ref 12.4–15.4)
GFR SERPL CREATININE-BSD FRML MDRD: 52 ML/MIN/1.73M2
GFR SERPL CREATININE-BSD FRML MDRD: 55 ML/MIN/1.73M2
GFR SERPL CREATININE-BSD FRML MDRD: 64 ML/MIN/1.73M2
GFR SERPL CREATININE-BSD FRML MDRD: 77 ML/MIN/1.73M2
GLUCOSE BLD-MCNC: 155 MG/DL (ref 70–99)
GLUCOSE BLD-MCNC: 186 MG/DL (ref 70–99)
GLUCOSE BLD-MCNC: 205 MG/DL (ref 70–99)
GLUCOSE SERPL-MCNC: 135 MG/DL (ref 70–99)
GLUCOSE SERPL-MCNC: 168 MG/DL (ref 70–99)
GLUCOSE SERPL-MCNC: 168 MG/DL (ref 70–99)
GLUCOSE SERPL-MCNC: 178 MG/DL (ref 70–99)
GLUCOSE UR STRIP-MCNC: NEGATIVE MG/DL
HCT VFR BLD AUTO: 38 % (ref 36–48)
HGB BLD-MCNC: 12.8 G/DL (ref 12–16)
HGB UR QL STRIP.AUTO: ABNORMAL
IMM GRANULOCYTES # BLD AUTO: 0.05 K/UL (ref 0–0.5)
IMM GRANULOCYTES NFR BLD: 1 %
KETONES UR STRIP-MCNC: NEGATIVE MG/DL
LEUKOCYTE ESTERASE UR QL STRIP: ABNORMAL
LYMPHOCYTES NFR BLD: 0.92 K/UL (ref 1–5.1)
LYMPHOCYTES RELATIVE PERCENT: 9 %
MCH RBC QN AUTO: 28.3 PG (ref 26–34)
MCHC RBC AUTO-ENTMCNC: 33.7 G/DL (ref 31–36)
MCV RBC AUTO: 84.1 FL (ref 80–100)
MONOCYTES NFR BLD: 1.12 K/UL (ref 0–1.3)
MONOCYTES NFR BLD: 11 %
NEUTROPHILS NFR BLD: 80 %
NEUTS SEG NFR BLD: 8.44 K/UL (ref 1.7–7.7)
NITRITE UR QL STRIP: NEGATIVE
OSMOLALITY SERPL: 293 MOSM/KG (ref 280–301)
OSMOLALITY UR: 251 MOSM/KG (ref 390–1070)
PH UR STRIP: 5.5 [PH] (ref 5–8)
PLATELET # BLD AUTO: 315 K/UL (ref 135–450)
PMV BLD AUTO: 9.4 FL
POTASSIUM SERPL-SCNC: 4.4 MMOL/L (ref 3.5–5.1)
POTASSIUM SERPL-SCNC: 4.5 MMOL/L (ref 3.5–5.1)
POTASSIUM SERPL-SCNC: 4.7 MMOL/L (ref 3.5–5.1)
POTASSIUM SERPL-SCNC: 4.9 MMOL/L (ref 3.5–5.1)
PROT UR STRIP-MCNC: ABNORMAL MG/DL
RBC # BLD AUTO: 4.52 M/UL (ref 4–5.2)
RBC #/AREA URNS HPF: ABNORMAL /HPF
SODIUM SERPL-SCNC: 125 MMOL/L (ref 136–145)
SODIUM SERPL-SCNC: 126 MMOL/L (ref 136–145)
SODIUM SERPL-SCNC: 126 MMOL/L (ref 136–145)
SODIUM SERPL-SCNC: 129 MMOL/L (ref 136–145)
SP GR UR STRIP: 1.02 (ref 1–1.03)
UROBILINOGEN UR STRIP-ACNC: 0.2 EU/DL (ref 0–1)
WBC #/AREA URNS HPF: ABNORMAL /HPF
WBC OTHER # BLD: 10.6 K/UL (ref 4–11)

## 2024-04-06 PROCEDURE — 83880 ASSAY OF NATRIURETIC PEPTIDE: CPT

## 2024-04-06 PROCEDURE — 1200000000 HC SEMI PRIVATE

## 2024-04-06 PROCEDURE — 2580000003 HC RX 258: Performed by: HOSPITALIST

## 2024-04-06 PROCEDURE — 99222 1ST HOSP IP/OBS MODERATE 55: CPT | Performed by: INTERNAL MEDICINE

## 2024-04-06 PROCEDURE — 6370000000 HC RX 637 (ALT 250 FOR IP): Performed by: INTERNAL MEDICINE

## 2024-04-06 PROCEDURE — 85025 COMPLETE CBC W/AUTO DIFF WBC: CPT

## 2024-04-06 PROCEDURE — 2700000000 HC OXYGEN THERAPY PER DAY

## 2024-04-06 PROCEDURE — 82962 GLUCOSE BLOOD TEST: CPT

## 2024-04-06 PROCEDURE — 36415 COLL VENOUS BLD VENIPUNCTURE: CPT

## 2024-04-06 PROCEDURE — 99233 SBSQ HOSP IP/OBS HIGH 50: CPT | Performed by: INTERNAL MEDICINE

## 2024-04-06 PROCEDURE — 6360000002 HC RX W HCPCS: Performed by: HOSPITALIST

## 2024-04-06 PROCEDURE — 80048 BASIC METABOLIC PNL TOTAL CA: CPT

## 2024-04-06 PROCEDURE — C9113 INJ PANTOPRAZOLE SODIUM, VIA: HCPCS | Performed by: HOSPITALIST

## 2024-04-06 PROCEDURE — 6370000000 HC RX 637 (ALT 250 FOR IP): Performed by: HOSPITALIST

## 2024-04-06 PROCEDURE — 2580000003 HC RX 258: Performed by: INTERNAL MEDICINE

## 2024-04-06 PROCEDURE — 81001 URINALYSIS AUTO W/SCOPE: CPT

## 2024-04-06 PROCEDURE — 94761 N-INVAS EAR/PLS OXIMETRY MLT: CPT

## 2024-04-06 PROCEDURE — 80162 ASSAY OF DIGOXIN TOTAL: CPT

## 2024-04-06 RX ORDER — SODIUM CHLORIDE 9 MG/ML
INJECTION, SOLUTION INTRAVENOUS CONTINUOUS
Status: DISCONTINUED | OUTPATIENT
Start: 2024-04-06 | End: 2024-04-06

## 2024-04-06 RX ORDER — 0.9 % SODIUM CHLORIDE 0.9 %
250 INTRAVENOUS SOLUTION INTRAVENOUS ONCE
Status: DISCONTINUED | OUTPATIENT
Start: 2024-04-06 | End: 2024-04-09 | Stop reason: HOSPADM

## 2024-04-06 RX ORDER — PANTOPRAZOLE SODIUM 40 MG/1
40 TABLET, DELAYED RELEASE ORAL DAILY
Status: DISCONTINUED | OUTPATIENT
Start: 2024-04-07 | End: 2024-04-09 | Stop reason: HOSPADM

## 2024-04-06 RX ORDER — SODIUM CHLORIDE, SODIUM LACTATE, POTASSIUM CHLORIDE, AND CALCIUM CHLORIDE .6; .31; .03; .02 G/100ML; G/100ML; G/100ML; G/100ML
500 INJECTION, SOLUTION INTRAVENOUS ONCE
Status: COMPLETED | OUTPATIENT
Start: 2024-04-06 | End: 2024-04-06

## 2024-04-06 RX ADMIN — SODIUM CHLORIDE, PRESERVATIVE FREE 10 ML: 5 INJECTION INTRAVENOUS at 08:51

## 2024-04-06 RX ADMIN — OXYCODONE 5 MG: 5 TABLET ORAL at 18:23

## 2024-04-06 RX ADMIN — GABAPENTIN 600 MG: 300 CAPSULE ORAL at 17:35

## 2024-04-06 RX ADMIN — BUPROPION HYDROCHLORIDE 150 MG: 150 TABLET, EXTENDED RELEASE ORAL at 08:51

## 2024-04-06 RX ADMIN — DULOXETINE HYDROCHLORIDE 30 MG: 30 CAPSULE, DELAYED RELEASE ORAL at 08:50

## 2024-04-06 RX ADMIN — ENOXAPARIN SODIUM 40 MG: 100 INJECTION SUBCUTANEOUS at 08:50

## 2024-04-06 RX ADMIN — SODIUM CHLORIDE, PRESERVATIVE FREE 10 ML: 5 INJECTION INTRAVENOUS at 21:33

## 2024-04-06 RX ADMIN — SODIUM CHLORIDE, POTASSIUM CHLORIDE, SODIUM LACTATE AND CALCIUM CHLORIDE 500 ML: 600; 310; 30; 20 INJECTION, SOLUTION INTRAVENOUS at 11:42

## 2024-04-06 RX ADMIN — ROSUVASTATIN 40 MG: 40 TABLET, FILM COATED ORAL at 21:31

## 2024-04-06 RX ADMIN — LEVETIRACETAM 500 MG: 500 TABLET, FILM COATED ORAL at 08:50

## 2024-04-06 RX ADMIN — ACETAMINOPHEN 650 MG: 325 TABLET ORAL at 08:50

## 2024-04-06 RX ADMIN — BUPROPION HYDROCHLORIDE 150 MG: 150 TABLET, EXTENDED RELEASE ORAL at 21:31

## 2024-04-06 RX ADMIN — ASPIRIN 81 MG: 81 TABLET, COATED ORAL at 08:51

## 2024-04-06 RX ADMIN — INSULIN GLARGINE 34 UNITS: 100 INJECTION, SOLUTION SUBCUTANEOUS at 12:20

## 2024-04-06 RX ADMIN — OXYCODONE 5 MG: 5 TABLET ORAL at 05:05

## 2024-04-06 RX ADMIN — PANTOPRAZOLE SODIUM 40 MG: 40 INJECTION, POWDER, FOR SOLUTION INTRAVENOUS at 08:50

## 2024-04-06 RX ADMIN — SODIUM CHLORIDE: 9 INJECTION, SOLUTION INTRAVENOUS at 21:30

## 2024-04-06 RX ADMIN — ACETAMINOPHEN 650 MG: 325 TABLET ORAL at 14:59

## 2024-04-06 RX ADMIN — Medication 1 TABLET: at 08:50

## 2024-04-06 RX ADMIN — CLONAZEPAM 1 MG: 1 TABLET ORAL at 13:23

## 2024-04-06 RX ADMIN — LEVETIRACETAM 500 MG: 500 TABLET, FILM COATED ORAL at 21:31

## 2024-04-06 RX ADMIN — ACETAMINOPHEN 650 MG: 325 TABLET ORAL at 21:31

## 2024-04-06 RX ADMIN — Medication 200 MG: at 08:51

## 2024-04-06 RX ADMIN — CEFTRIAXONE SODIUM 2000 MG: 2 INJECTION, POWDER, FOR SOLUTION INTRAMUSCULAR; INTRAVENOUS at 22:42

## 2024-04-06 RX ADMIN — CLONAZEPAM 1 MG: 1 TABLET ORAL at 05:05

## 2024-04-06 RX ADMIN — GABAPENTIN 300 MG: 300 CAPSULE ORAL at 08:50

## 2024-04-06 ASSESSMENT — PAIN DESCRIPTION - ORIENTATION
ORIENTATION: POSTERIOR
ORIENTATION: MID

## 2024-04-06 ASSESSMENT — PAIN DESCRIPTION - ONSET
ONSET: ON-GOING

## 2024-04-06 ASSESSMENT — PAIN DESCRIPTION - LOCATION
LOCATION: HEAD;BACK
LOCATION: HEAD;NECK
LOCATION: GENERALIZED
LOCATION: NECK
LOCATION: GENERALIZED
LOCATION: GENERALIZED
LOCATION: HEAD;NECK
LOCATION: GENERALIZED
LOCATION: HEAD;BACK

## 2024-04-06 ASSESSMENT — PAIN DESCRIPTION - FREQUENCY
FREQUENCY: CONTINUOUS

## 2024-04-06 ASSESSMENT — PAIN SCALES - GENERAL
PAINLEVEL_OUTOF10: 6
PAINLEVEL_OUTOF10: 2
PAINLEVEL_OUTOF10: 0
PAINLEVEL_OUTOF10: 8
PAINLEVEL_OUTOF10: 0
PAINLEVEL_OUTOF10: 3
PAINLEVEL_OUTOF10: 3
PAINLEVEL_OUTOF10: 8
PAINLEVEL_OUTOF10: 3
PAINLEVEL_OUTOF10: 3
PAINLEVEL_OUTOF10: 5
PAINLEVEL_OUTOF10: 0
PAINLEVEL_OUTOF10: 6

## 2024-04-06 ASSESSMENT — PAIN DESCRIPTION - PAIN TYPE
TYPE: CHRONIC PAIN

## 2024-04-06 ASSESSMENT — PAIN SCALES - WONG BAKER
WONGBAKER_NUMERICALRESPONSE: NO HURT

## 2024-04-06 ASSESSMENT — PAIN - FUNCTIONAL ASSESSMENT
PAIN_FUNCTIONAL_ASSESSMENT: ACTIVITIES ARE NOT PREVENTED

## 2024-04-06 ASSESSMENT — PAIN DESCRIPTION - DESCRIPTORS
DESCRIPTORS: SORE
DESCRIPTORS: ACHING
DESCRIPTORS: ACHING
DESCRIPTORS: SORE
DESCRIPTORS: SORE
DESCRIPTORS: ACHING
DESCRIPTORS: SORE
DESCRIPTORS: ACHING
DESCRIPTORS: ACHING;SHARP

## 2024-04-06 NOTE — FLOWSHEET NOTE
04/06/24 1346   Vital Signs   Temp 98.2 °F (36.8 °C)   Temp Source Oral   Pulse 63   Heart Rate Source Monitor   Respirations 19   /79   MAP (Calculated) 89   BP Location Right upper arm   BP Method Automatic   Patient Position Semi fowlers   Oxygen Therapy   SpO2 99 %   O2 Device Nasal cannula   O2 Flow Rate (L/min) 1.5 L/min     Patient transferred to room 3202 from ICU at this time, CHG bath performed, vital signs obtained, admission assessment completed. Patient denies any further concerns at this time. Call light within reach and bed alarm activated.

## 2024-04-06 NOTE — ACP (ADVANCE CARE PLANNING)
Advanced Care Planning Note:     Purpose of Encounter: Advanced care planning in light of hospitalization    Parties In Attendance: Patient      Decisional Capacity: Yes    Subjective: Patient understands that this conversation for any life threatening event and his future wishes to address long term care goal if situations arise that prevent the ability to personally give input    Objective: Patient is admitted to the hospital with sepsis, UTI, hyponatremia and elevated troponin.    Goals of Care Determination: Patient would like to pursue  continue full care with all aggressive measures.    Code Status: Full code    Time spent on Advanced care Plannin minutes    Advanced Care Planning Documents: documented patient's wishes, would like her daughter Elba Greene to make medical decisions if unable to make decisions    Chris Mukherjee MD  2024 6:00 PM

## 2024-04-06 NOTE — RT PROTOCOL NOTE
RT Inhaler-Nebulizer Bronchodilator Protocol Note    There is a bronchodilator order in the chart from a provider indicating to follow the RT Bronchodilator Protocol and there is an “Initiate RT Inhaler-Nebulizer Bronchodilator Protocol” order as well (see protocol at bottom of note).    CXR Findings:  XR CHEST PORTABLE    Result Date: 4/5/2024  1. Cardiomegaly.      The findings from the last RT Protocol Assessment were as follows:   History Pulmonary Disease: Chronic pulmonary disease  Respiratory Pattern: Regular pattern and RR 12-20 bpm  Breath Sounds: Slightly diminished and/or crackles  Cough: Strong, spontaneous, non-productive  Indication for Bronchodilator Therapy: Decreased or absent breath sounds  Bronchodilator Assessment Score: 4    Aerosolized bronchodilator medication orders have been revised according to the RT Inhaler-Nebulizer Bronchodilator Protocol below.    Respiratory Therapist to perform RT Therapy Protocol Assessment initially then follow the protocol.  Repeat RT Therapy Protocol Assessment PRN for score 0-3 or on second treatment, BID, and PRN for scores above 3.    No Indications - adjust the frequency to every 6 hours PRN wheezing or bronchospasm, if no treatments needed after 48 hours then discontinue using Per Protocol order mode.     If indication present, adjust the RT bronchodilator orders based on the Bronchodilator Assessment Score as indicated below.  Use Inhaler orders unless patient has one or more of the following: on home nebulizer, not able to hold breath for 10 seconds, is not alert and oriented, cannot activate and use MDI correctly, or respiratory rate 25 breaths per minute or more, then use the equivalent nebulizer order(s) with same Frequency and PRN reasons based on the score.  If a patient is on this medication at home then do not decrease Frequency below that used at home.    0-3 - enter or revise RT bronchodilator order(s) to equivalent RT Bronchodilator order with  Normal vision: sees adequately in most situations; can see medication labels, newsprint

## 2024-04-06 NOTE — PLAN OF CARE
Problem: Safety - Adult  Goal: Free from fall injury  4/6/2024 0040 by Rachel Rankin RN  Outcome: Progressing  4/5/2024 1943 by Anny Mcqueen RN  Outcome: Progressing     Problem: Discharge Planning  Goal: Discharge to home or other facility with appropriate resources  4/6/2024 0040 by Rachel Rankin RN  Outcome: Progressing  4/5/2024 1943 by Anny Mcqueen RN  Outcome: Progressing     Problem: Pain  Goal: Verbalizes/displays adequate comfort level or baseline comfort level  4/6/2024 0040 by Rachel Rankin RN  Outcome: Progressing  4/5/2024 1943 by Anny Mcqueen RN  Outcome: Progressing     Problem: Skin/Tissue Integrity  Goal: Absence of new skin breakdown  Description: 1.  Monitor for areas of redness and/or skin breakdown  2.  Assess vascular access sites hourly  3.  Every 4-6 hours minimum:  Change oxygen saturation probe site  4.  Every 4-6 hours:  If on nasal continuous positive airway pressure, respiratory therapy assess nares and determine need for appliance change or resting period.  4/6/2024 0040 by Rachel Rankin RN  Outcome: Progressing  4/5/2024 1943 by Anny Mcqueen RN  Outcome: Progressing     Problem: ABCDS Injury Assessment  Goal: Absence of physical injury  4/6/2024 0040 by Rachel Rankin RN  Outcome: Progressing  4/5/2024 1943 by Anny Mcqueen RN  Outcome: Progressing

## 2024-04-06 NOTE — PLAN OF CARE
Problem: Safety - Adult  Goal: Free from fall injury  4/6/2024 1232 by Liyah Dunbar RN  Outcome: Progressing  Flowsheets (Taken 4/6/2024 0850)  Free From Fall Injury: Instruct family/caregiver on patient safety  4/6/2024 0040 by Rachel Rankin RN  Outcome: Progressing     Problem: Discharge Planning  Goal: Discharge to home or other facility with appropriate resources  4/6/2024 1232 by Liyah Dunbar RN  Outcome: Progressing  Flowsheets (Taken 4/6/2024 0850)  Discharge to home or other facility with appropriate resources: Identify barriers to discharge with patient and caregiver  4/6/2024 0040 by Rachel Rankin RN  Outcome: Progressing     Problem: Pain  Goal: Verbalizes/displays adequate comfort level or baseline comfort level  4/6/2024 1232 by Liyah Dunbar RN  Outcome: Progressing  Flowsheets (Taken 4/6/2024 0850)  Verbalizes/displays adequate comfort level or baseline comfort level:   Encourage patient to monitor pain and request assistance   Assess pain using appropriate pain scale  4/6/2024 0040 by Rachel Rankin RN  Outcome: Progressing     Problem: Skin/Tissue Integrity  Goal: Absence of new skin breakdown  Description: 1.  Monitor for areas of redness and/or skin breakdown  2.  Assess vascular access sites hourly  3.  Every 4-6 hours minimum:  Change oxygen saturation probe site  4.  Every 4-6 hours:  If on nasal continuous positive airway pressure, respiratory therapy assess nares and determine need for appliance change or resting period.  4/6/2024 1232 by Liyah Dunbar RN  Outcome: Progressing  4/6/2024 0040 by Rachel Rankin RN  Outcome: Progressing     Problem: ABCDS Injury Assessment  Goal: Absence of physical injury  4/6/2024 1232 by Liyah Dunbar RN  Outcome: Progressing  Flowsheets (Taken 4/6/2024 0850)  Absence of Physical Injury: Implement safety measures based on patient assessment  4/6/2024 0040 by Rachel Rankin RN  Outcome: Progressing

## 2024-04-07 LAB
ANION GAP SERPL CALCULATED.3IONS-SCNC: 10 MMOL/L (ref 3–16)
ANION GAP SERPL CALCULATED.3IONS-SCNC: 11 MMOL/L (ref 3–16)
BASOPHILS # BLD: 0.04 K/UL (ref 0–0.2)
BASOPHILS NFR BLD: 1 %
BUN SERPL-MCNC: 28 MG/DL (ref 7–20)
BUN SERPL-MCNC: 28 MG/DL (ref 7–20)
BUN SERPL-MCNC: 31 MG/DL (ref 7–20)
BUN SERPL-MCNC: 33 MG/DL (ref 7–20)
CALCIUM SERPL-MCNC: 8.2 MG/DL (ref 8.3–10.6)
CALCIUM SERPL-MCNC: 8.5 MG/DL (ref 8.3–10.6)
CALCIUM SERPL-MCNC: 8.6 MG/DL (ref 8.3–10.6)
CALCIUM SERPL-MCNC: 8.7 MG/DL (ref 8.3–10.6)
CHLORIDE SERPL-SCNC: 93 MMOL/L (ref 99–110)
CHLORIDE SERPL-SCNC: 96 MMOL/L (ref 99–110)
CHLORIDE SERPL-SCNC: 97 MMOL/L (ref 99–110)
CHLORIDE SERPL-SCNC: 99 MMOL/L (ref 99–110)
CO2 SERPL-SCNC: 23 MMOL/L (ref 21–32)
CO2 SERPL-SCNC: 24 MMOL/L (ref 21–32)
CO2 SERPL-SCNC: 24 MMOL/L (ref 21–32)
CO2 SERPL-SCNC: 25 MMOL/L (ref 21–32)
CREAT SERPL-MCNC: 0.7 MG/DL (ref 0.6–1.2)
CREAT SERPL-MCNC: 0.8 MG/DL (ref 0.6–1.2)
CREAT SERPL-MCNC: 0.9 MG/DL (ref 0.6–1.2)
CREAT SERPL-MCNC: 0.9 MG/DL (ref 0.6–1.2)
EOSINOPHIL # BLD: 0.24 K/UL (ref 0–0.6)
EOSINOPHILS RELATIVE PERCENT: 3 %
ERYTHROCYTE [DISTWIDTH] IN BLOOD BY AUTOMATED COUNT: 13.6 % (ref 12.4–15.4)
GFR SERPL CREATININE-BSD FRML MDRD: 70 ML/MIN/1.73M2
GFR SERPL CREATININE-BSD FRML MDRD: 72 ML/MIN/1.73M2
GFR SERPL CREATININE-BSD FRML MDRD: 74 ML/MIN/1.73M2
GFR SERPL CREATININE-BSD FRML MDRD: >90 ML/MIN/1.73M2
GLUCOSE BLD-MCNC: 103 MG/DL (ref 70–99)
GLUCOSE BLD-MCNC: 154 MG/DL (ref 70–99)
GLUCOSE BLD-MCNC: 193 MG/DL (ref 70–99)
GLUCOSE BLD-MCNC: 97 MG/DL (ref 70–99)
GLUCOSE SERPL-MCNC: 118 MG/DL (ref 70–99)
GLUCOSE SERPL-MCNC: 118 MG/DL (ref 70–99)
GLUCOSE SERPL-MCNC: 141 MG/DL (ref 70–99)
GLUCOSE SERPL-MCNC: 158 MG/DL (ref 70–99)
HCT VFR BLD AUTO: 33.8 % (ref 36–48)
HGB BLD-MCNC: 11.2 G/DL (ref 12–16)
IMM GRANULOCYTES # BLD AUTO: 0.17 K/UL (ref 0–0.5)
IMM GRANULOCYTES NFR BLD: 2 %
LYMPHOCYTES NFR BLD: 1.55 K/UL (ref 1–5.1)
LYMPHOCYTES RELATIVE PERCENT: 19 %
MCH RBC QN AUTO: 28.5 PG (ref 26–34)
MCHC RBC AUTO-ENTMCNC: 33.1 G/DL (ref 31–36)
MCV RBC AUTO: 86 FL (ref 80–100)
MICROORGANISM SPEC CULT: ABNORMAL
MONOCYTES NFR BLD: 1.08 K/UL (ref 0–1.3)
MONOCYTES NFR BLD: 13 %
NEUTROPHILS NFR BLD: 63 %
NEUTS SEG NFR BLD: 5.28 K/UL (ref 1.7–7.7)
PLATELET # BLD AUTO: 233 K/UL (ref 135–450)
PMV BLD AUTO: 9.4 FL
POTASSIUM SERPL-SCNC: 4 MMOL/L (ref 3.5–5.1)
POTASSIUM SERPL-SCNC: 4.1 MMOL/L (ref 3.5–5.1)
POTASSIUM SERPL-SCNC: 4.2 MMOL/L (ref 3.5–5.1)
POTASSIUM SERPL-SCNC: 4.4 MMOL/L (ref 3.5–5.1)
RBC # BLD AUTO: 3.93 M/UL (ref 4–5.2)
SODIUM SERPL-SCNC: 126 MMOL/L (ref 136–145)
SODIUM SERPL-SCNC: 129 MMOL/L (ref 136–145)
SODIUM SERPL-SCNC: 132 MMOL/L (ref 136–145)
SODIUM SERPL-SCNC: 133 MMOL/L (ref 136–145)
SODIUM SERPL-SCNC: 134 MMOL/L (ref 136–145)
SPECIMEN DESCRIPTION: ABNORMAL
WBC OTHER # BLD: 8.4 K/UL (ref 4–11)

## 2024-04-07 PROCEDURE — 82962 GLUCOSE BLOOD TEST: CPT

## 2024-04-07 PROCEDURE — 6370000000 HC RX 637 (ALT 250 FOR IP): Performed by: HOSPITALIST

## 2024-04-07 PROCEDURE — 2700000000 HC OXYGEN THERAPY PER DAY

## 2024-04-07 PROCEDURE — 6370000000 HC RX 637 (ALT 250 FOR IP): Performed by: INTERNAL MEDICINE

## 2024-04-07 PROCEDURE — 84295 ASSAY OF SERUM SODIUM: CPT

## 2024-04-07 PROCEDURE — 2580000003 HC RX 258: Performed by: HOSPITALIST

## 2024-04-07 PROCEDURE — 2580000003 HC RX 258: Performed by: INTERNAL MEDICINE

## 2024-04-07 PROCEDURE — 36415 COLL VENOUS BLD VENIPUNCTURE: CPT

## 2024-04-07 PROCEDURE — 99232 SBSQ HOSP IP/OBS MODERATE 35: CPT | Performed by: INTERNAL MEDICINE

## 2024-04-07 PROCEDURE — 6360000002 HC RX W HCPCS: Performed by: HOSPITALIST

## 2024-04-07 PROCEDURE — 80048 BASIC METABOLIC PNL TOTAL CA: CPT

## 2024-04-07 PROCEDURE — 94640 AIRWAY INHALATION TREATMENT: CPT

## 2024-04-07 PROCEDURE — 1200000000 HC SEMI PRIVATE

## 2024-04-07 PROCEDURE — 85025 COMPLETE CBC W/AUTO DIFF WBC: CPT

## 2024-04-07 PROCEDURE — 94761 N-INVAS EAR/PLS OXIMETRY MLT: CPT

## 2024-04-07 RX ORDER — CIPROFLOXACIN 500 MG/1
500 TABLET, FILM COATED ORAL EVERY 12 HOURS SCHEDULED
Status: DISCONTINUED | OUTPATIENT
Start: 2024-04-07 | End: 2024-04-09 | Stop reason: HOSPADM

## 2024-04-07 RX ORDER — SODIUM CHLORIDE 9 MG/ML
INJECTION, SOLUTION INTRAVENOUS CONTINUOUS
Status: DISCONTINUED | OUTPATIENT
Start: 2024-04-07 | End: 2024-04-08

## 2024-04-07 RX ADMIN — Medication 200 MG: at 08:46

## 2024-04-07 RX ADMIN — PANTOPRAZOLE SODIUM 40 MG: 40 TABLET, DELAYED RELEASE ORAL at 08:46

## 2024-04-07 RX ADMIN — ACETAMINOPHEN 650 MG: 325 TABLET ORAL at 21:31

## 2024-04-07 RX ADMIN — SODIUM CHLORIDE, PRESERVATIVE FREE 10 ML: 5 INJECTION INTRAVENOUS at 08:48

## 2024-04-07 RX ADMIN — GABAPENTIN 300 MG: 300 CAPSULE ORAL at 08:46

## 2024-04-07 RX ADMIN — CLONAZEPAM 1 MG: 1 TABLET ORAL at 03:47

## 2024-04-07 RX ADMIN — OXYCODONE 5 MG: 5 TABLET ORAL at 20:46

## 2024-04-07 RX ADMIN — BUPROPION HYDROCHLORIDE 150 MG: 150 TABLET, EXTENDED RELEASE ORAL at 20:47

## 2024-04-07 RX ADMIN — IPRATROPIUM BROMIDE AND ALBUTEROL SULFATE 1 DOSE: 2.5; .5 SOLUTION RESPIRATORY (INHALATION) at 21:28

## 2024-04-07 RX ADMIN — LEVETIRACETAM 500 MG: 500 TABLET, FILM COATED ORAL at 20:46

## 2024-04-07 RX ADMIN — GABAPENTIN 600 MG: 300 CAPSULE ORAL at 17:39

## 2024-04-07 RX ADMIN — SODIUM CHLORIDE, PRESERVATIVE FREE 10 ML: 5 INJECTION INTRAVENOUS at 21:32

## 2024-04-07 RX ADMIN — ENOXAPARIN SODIUM 40 MG: 100 INJECTION SUBCUTANEOUS at 08:46

## 2024-04-07 RX ADMIN — OXYCODONE 5 MG: 5 TABLET ORAL at 15:17

## 2024-04-07 RX ADMIN — LEVETIRACETAM 500 MG: 500 TABLET, FILM COATED ORAL at 08:46

## 2024-04-07 RX ADMIN — ACETAMINOPHEN 650 MG: 325 TABLET ORAL at 09:42

## 2024-04-07 RX ADMIN — SODIUM CHLORIDE: 9 INJECTION, SOLUTION INTRAVENOUS at 14:05

## 2024-04-07 RX ADMIN — CIPROFLOXACIN HYDROCHLORIDE 500 MG: 500 TABLET, FILM COATED ORAL at 14:06

## 2024-04-07 RX ADMIN — INSULIN GLARGINE 34 UNITS: 100 INJECTION, SOLUTION SUBCUTANEOUS at 12:19

## 2024-04-07 RX ADMIN — ROSUVASTATIN 40 MG: 40 TABLET, FILM COATED ORAL at 20:46

## 2024-04-07 RX ADMIN — ASPIRIN 81 MG: 81 TABLET, COATED ORAL at 08:46

## 2024-04-07 RX ADMIN — BUPROPION HYDROCHLORIDE 150 MG: 150 TABLET, EXTENDED RELEASE ORAL at 08:46

## 2024-04-07 RX ADMIN — Medication 1 TABLET: at 08:46

## 2024-04-07 RX ADMIN — CLONAZEPAM 1 MG: 1 TABLET ORAL at 20:46

## 2024-04-07 RX ADMIN — ACETAMINOPHEN 650 MG: 325 TABLET ORAL at 03:47

## 2024-04-07 RX ADMIN — CLONAZEPAM 1 MG: 1 TABLET ORAL at 13:23

## 2024-04-07 ASSESSMENT — PAIN SCALES - GENERAL
PAINLEVEL_OUTOF10: 7
PAINLEVEL_OUTOF10: 0
PAINLEVEL_OUTOF10: 7
PAINLEVEL_OUTOF10: 3
PAINLEVEL_OUTOF10: 3
PAINLEVEL_OUTOF10: 10
PAINLEVEL_OUTOF10: 7
PAINLEVEL_OUTOF10: 0

## 2024-04-07 ASSESSMENT — PAIN DESCRIPTION - PAIN TYPE
TYPE: CHRONIC PAIN
TYPE: CHRONIC PAIN
TYPE: ACUTE PAIN
TYPE: CHRONIC PAIN;ACUTE PAIN
TYPE: CHRONIC PAIN

## 2024-04-07 ASSESSMENT — PAIN - FUNCTIONAL ASSESSMENT
PAIN_FUNCTIONAL_ASSESSMENT: ACTIVITIES ARE NOT PREVENTED

## 2024-04-07 ASSESSMENT — PAIN DESCRIPTION - LOCATION
LOCATION: BACK
LOCATION: BACK
LOCATION: HEAD;NECK
LOCATION: ABDOMEN;BACK;HEAD
LOCATION: HEAD
LOCATION: ABDOMEN

## 2024-04-07 ASSESSMENT — PAIN DESCRIPTION - ONSET
ONSET: ON-GOING
ONSET: PROGRESSIVE
ONSET: ON-GOING
ONSET: ON-GOING

## 2024-04-07 ASSESSMENT — PAIN SCALES - WONG BAKER
WONGBAKER_NUMERICALRESPONSE: NO HURT

## 2024-04-07 ASSESSMENT — PAIN DESCRIPTION - DESCRIPTORS
DESCRIPTORS: SHARP;SHOOTING
DESCRIPTORS: ACHING
DESCRIPTORS: THROBBING
DESCRIPTORS: ACHING
DESCRIPTORS: SHARP;SHOOTING
DESCRIPTORS: SHARP

## 2024-04-07 ASSESSMENT — PAIN DESCRIPTION - ORIENTATION
ORIENTATION: MID
ORIENTATION: MID
ORIENTATION: OTHER (COMMENT)

## 2024-04-07 ASSESSMENT — PAIN DESCRIPTION - FREQUENCY
FREQUENCY: INTERMITTENT
FREQUENCY: CONTINUOUS
FREQUENCY: CONTINUOUS

## 2024-04-07 NOTE — PLAN OF CARE
Problem: Safety - Adult  Goal: Free from fall injury  4/7/2024 0951 by Latoya Estrada RN  Outcome: Progressing  Flowsheets (Taken 4/7/2024 0951)  Free From Fall Injury: Instruct family/caregiver on patient safety  4/6/2024 2322 by Alexi Clark RN  Outcome: Progressing  Flowsheets (Taken 4/6/2024 2322)  Free From Fall Injury:   Instruct family/caregiver on patient safety   Based on caregiver fall risk screen, instruct family/caregiver to ask for assistance with transferring infant if caregiver noted to have fall risk factors     Problem: Discharge Planning  Goal: Discharge to home or other facility with appropriate resources  4/7/2024 0951 by Latoya Estrada RN  Outcome: Progressing  Flowsheets (Taken 4/7/2024 0951)  Discharge to home or other facility with appropriate resources:   Identify discharge learning needs (meds, wound care, etc)   Arrange for needed discharge resources and transportation as appropriate   Arrange for interpreters to assist at discharge as needed   Identify barriers to discharge with patient and caregiver  4/6/2024 2322 by Alexi Clark RN  Outcome: Progressing  Flowsheets (Taken 4/6/2024 2322)  Discharge to home or other facility with appropriate resources:   Identify barriers to discharge with patient and caregiver   Identify discharge learning needs (meds, wound care, etc)   Refer to discharge planning if patient needs post-hospital services based on physician order or complex needs related to functional status, cognitive ability or social support system   Arrange for needed discharge resources and transportation as appropriate     Problem: Pain  Goal: Verbalizes/displays adequate comfort level or baseline comfort level  4/7/2024 0951 by Latoya Estrada RN  Outcome: Progressing  Flowsheets (Taken 4/7/2024 0951)  Verbalizes/displays adequate comfort level or baseline comfort level:   Encourage patient to monitor pain and request assistance   Administer analgesics based on

## 2024-04-07 NOTE — CONSULTS
Pulmonary Consult    Patient's PCP: No primary care provider on file.  Referred by: Edin Vital MD  for sepsis     HISTORY OF PRESENT ILLNESS:    This is a  73 y.o. female with PMH of A.fib, diastolic CHF, COPD and others as listed below presented to the ED with epigastric pain and SOB.  At the time of evaluation while she was in the ED she was laying flat, breathing was not labored.  Her main complain was abdominal pain in the epigastric area with some tenderness but it was not as bad as the time she presented.  She was alert but was not elaborating on her history.  It is not clear how compliant she is with medications.      Past Medical / Surgical History:    Past Medical History:   Diagnosis Date    A-fib (HCC)     COPD (chronic obstructive pulmonary disease) (HCC)     Diabetes mellitus (HCC)     Hypertension      History reviewed. No pertinent surgical history.  Prior to Admission:    No current facility-administered medications on file prior to encounter.     Current Outpatient Medications on File Prior to Encounter   Medication Sig Dispense Refill    albuterol sulfate HFA (VENTOLIN HFA) 108 (90 Base) MCG/ACT inhaler Inhale 2 puffs into the lungs every 6 hours as needed for Wheezing or Shortness of Breath      alendronate (FOSAMAX) 70 MG tablet Take 1 tablet by mouth every 7 days on Sundays      aspirin 81 MG EC tablet Take 1 tablet by mouth daily      buPROPion (WELLBUTRIN SR) 150 MG extended release tablet Take 1 tablet by mouth 2 times daily      acetaminophen-butalbital  MG TABS Take 1 tablet by mouth every 4 hours as needed for Headaches      clonazePAM (KLONOPIN) 1 MG tablet Take 1 tablet by mouth 3 times daily as needed for Anxiety.      dilTIAZem (DILACOR XR) 180 MG extended release capsule Take 1 capsule by mouth daily      donepezil (ARICEPT) 5 MG tablet Take 1 tablet by mouth nightly      DULoxetine (CYMBALTA) 30 MG extended release capsule Take 1 capsule by mouth 2 times daily      
[facility]  Consultation Note    Reason for Consult:  Hyponatremia  Requesting Physician:  Dr. SVETA Mukherjee    CHIEF COMPLAINT:      History obtained from records and patient.    HISTORY OF PRESENT ILLNESS:                Viji Gamez  is 73 y.o. y.o. female with significant past medical history of DM 2, HTN, A. Fib, COPD, Diastolic CHF, S/P Watchman Device, CVA, Anxiety, Alcohol Abuse who presents with confusion, nausea and abdominal pain.   Admitted for possible UTI/Sepsis and hyponatremia.  I am asked to see the patient since Na was 123 4/5/24 and now at 129.  12/2023 Na was 138.  On Bupropion and Duloxetine as an outpatient.  SBP 's.  -2.3L since admission.   Uosm 251 with Nilam less than 20.  H/O Seizures.  No complaints today.     Past Medical History:     has a past medical history of A-fib (HCC), COPD (chronic obstructive pulmonary disease) (HCC), Diabetes mellitus (HCC), and Hypertension.   Past Surgical History:     has no past surgical history on file.   Current Medications:    Current Facility-Administered Medications: pantoprazole (PROTONIX) tablet 40 mg, 40 mg, Oral, Daily  sodium chloride 0.9 % bolus 250 mL, 250 mL, IntraVENous, Once  sodium chloride flush 0.9 % injection 5-40 mL, 5-40 mL, IntraVENous, PRN  potassium chloride (KLOR-CON M) extended release tablet 40 mEq, 40 mEq, Oral, PRN **OR** potassium bicarb-citric acid (EFFER-K) effervescent tablet 40 mEq, 40 mEq, Oral, PRN **OR** potassium chloride 10 mEq/100 mL IVPB (Peripheral Line), 10 mEq, IntraVENous, PRN  magnesium sulfate 2000 mg in 50 mL IVPB premix, 2,000 mg, IntraVENous, PRN  enoxaparin (LOVENOX) injection 40 mg, 40 mg, SubCUTAneous, Daily  ondansetron (ZOFRAN-ODT) disintegrating tablet 4 mg, 4 mg, Oral, Q8H PRN **OR** ondansetron (ZOFRAN) injection 4 mg, 4 mg, IntraVENous, Q6H PRN  melatonin tablet 3 mg, 3 mg, Oral, Nightly PRN  polyethylene glycol (GLYCOLAX) packet 17 g, 17 g, Oral, Daily  senna 
reviewed.      LABS  Pro-BNP31,138 High pg/mL    WBC, UA21 TO 50 Abnormal /HPF RBC, UA10 TO 20 Abnormal /HPF Epithelial Cells UA3 to 5/HPF Bacteria, UA3+ Abnormal  Other Observations UAUrine Reflexed to Culture Abnormal     AST31U/L    EthanolNONE DETECTEDmg/dL    WBC10.6k/uL RBC4.52m/uL Hkygeevrcy93.8g/dL Ikaijxpujz31.0% MCV84.1fL MCH28.3pg MCHC33.7g/dL RDW13.7% Awsejahge627a/uL    Yfexjp623 Low mmol/L Potassium4.4mmol/L   Chloride 95 Low mmol/L QX890czpq/L Anion Ayg10oxfo/L Jfucbcr139 High mg/dL BUN27 High mg/dL Creatinine0.8mg/dL     Latest Reference Range & Units 04/05/24 13:23 04/05/24 17:02 04/05/24 22:11   Troponin, High Sensitivity 0 - 14 ng/L 157 (H) 124 (H) 74 (H)       EKG:  Afib, possible septal infarct, NSST changes      CXR Personally reviewed, No infiltrate  Minimal streaky platelike atelectasis in the right costophrenic angle.  No active airspace disease  No vascular redistribution  No interstitial peripheral Kerley B line's  No effusion.     IMPRESSION:  1. Cardiomegaly.    CT ABD  Impression:  1. Mildly distended gallbladder, cholelithiasis. No pericholecystic fluid   2. No intrahepatic or extrahepatic biliary duct dilatation   3. Aortic atherosclerosis. Extensive calcification with no aneurysm or   dissection.   4. Small hiatal hernia   5. Sigmoid diverticulosis. No evidence of acute diverticulitis   6. Long catheter in the urinary bladder, partially decompressed. Wall   thickening may be associated with history of chronic cystitis. 7. Basilar   bronchovascular crowding, dependent atelectasis in right middle lobe   subsegmental atelectasis which is not seen on conventional radiography. Interval   follow-up computed tomography in 3 to 6 months is recommended for surveillance.    CT HEAD  1. Atrophic changes   2. Extensive periventricular microangiopathic ischemic changes, chronic small   vessel disease   3. No evidence of acute intracranial hemorrhage   4. Chronic maxillary sinusitis with near

## 2024-04-07 NOTE — FLOWSHEET NOTE
04/07/24 0803   Vital Signs   Temp 97.7 °F (36.5 °C)   Temp Source Oral   Pulse 63   Heart Rate Source Monitor   Respirations 16   /82   MAP (Calculated) 98   BP Location Right upper arm   BP Method Automatic   Patient Position Semi fowlers   Oxygen Therapy   SpO2 100 %   O2 Device Nasal cannula   O2 Flow Rate (L/min) 2 L/min     Shift assessment completed, patient awake and alert sitting up in bed. Morning medications given and PRN Tylenol given see MAR. Fluids given per request. Long catheter is patent and draining with no kinks. Patient denies any further concerns at this time. Call light within reach and bed alarm on.

## 2024-04-07 NOTE — PLAN OF CARE
Problem: Safety - Adult  Goal: Free from fall injury  4/6/2024 2322 by Alexi Clark, RN  Outcome: Progressing  Flowsheets (Taken 4/6/2024 2322)  Free From Fall Injury:   Instruct family/caregiver on patient safety   Based on caregiver fall risk screen, instruct family/caregiver to ask for assistance with transferring infant if caregiver noted to have fall risk factors     Problem: Discharge Planning  Goal: Discharge to home or other facility with appropriate resources  4/6/2024 2322 by Alexi Clark, RN  Outcome: Progressing  Flowsheets (Taken 4/6/2024 2322)  Discharge to home or other facility with appropriate resources:   Identify barriers to discharge with patient and caregiver   Identify discharge learning needs (meds, wound care, etc)   Refer to discharge planning if patient needs post-hospital services based on physician order or complex needs related to functional status, cognitive ability or social support system   Arrange for needed discharge resources and transportation as appropriate     Problem: Pain  Goal: Verbalizes/displays adequate comfort level or baseline comfort level  4/6/2024 2322 by Alexi Clark, RN  Outcome: Progressing  Flowsheets (Taken 4/6/2024 2322)  Verbalizes/displays adequate comfort level or baseline comfort level:   Encourage patient to monitor pain and request assistance   Administer analgesics based on type and severity of pain and evaluate response   Consider cultural and social influences on pain and pain management   Assess pain using appropriate pain scale   Implement non-pharmacological measures as appropriate and evaluate response   Notify Licensed Independent Practitioner if interventions unsuccessful or patient reports new pain     Problem: ABCDS Injury Assessment  Goal: Absence of physical injury  4/6/2024 2322 by Alexi Clark, RN  Outcome: Progressing  Flowsheets (Taken 4/6/2024 2322)  Absence of Physical Injury: Implement safety measures based on patient

## 2024-04-08 ENCOUNTER — APPOINTMENT (OUTPATIENT)
Age: 74
DRG: 720 | End: 2024-04-08
Payer: COMMERCIAL

## 2024-04-08 LAB
ANION GAP SERPL CALCULATED.3IONS-SCNC: 9 MMOL/L (ref 3–16)
BASOPHILS # BLD: 0.05 K/UL (ref 0–0.2)
BASOPHILS NFR BLD: 1 %
BUN SERPL-MCNC: 19 MG/DL (ref 7–20)
BUN SERPL-MCNC: 23 MG/DL (ref 7–20)
BUN SERPL-MCNC: 26 MG/DL (ref 7–20)
CALCIUM SERPL-MCNC: 8.6 MG/DL (ref 8.3–10.6)
CHLORIDE SERPL-SCNC: 100 MMOL/L (ref 99–110)
CHLORIDE SERPL-SCNC: 101 MMOL/L (ref 99–110)
CHLORIDE SERPL-SCNC: 102 MMOL/L (ref 99–110)
CO2 SERPL-SCNC: 23 MMOL/L (ref 21–32)
CO2 SERPL-SCNC: 24 MMOL/L (ref 21–32)
CO2 SERPL-SCNC: 25 MMOL/L (ref 21–32)
CREAT SERPL-MCNC: 0.6 MG/DL (ref 0.6–1.2)
EKG DIAGNOSIS: NORMAL
EKG DIAGNOSIS: NORMAL
EKG Q-T INTERVAL: 344 MS
EKG Q-T INTERVAL: 350 MS
EKG QRS DURATION: 72 MS
EKG QRS DURATION: 74 MS
EKG QTC CALCULATION (BAZETT): 471 MS
EKG QTC CALCULATION (BAZETT): 480 MS
EKG R AXIS: 15 DEGREES
EKG R AXIS: 34 DEGREES
EKG T AXIS: 18 DEGREES
EKG T AXIS: 79 DEGREES
EKG VENTRICULAR RATE: 113 BPM
EKG VENTRICULAR RATE: 113 BPM
EOSINOPHIL # BLD: 0.47 K/UL (ref 0–0.6)
EOSINOPHILS RELATIVE PERCENT: 5 %
ERYTHROCYTE [DISTWIDTH] IN BLOOD BY AUTOMATED COUNT: 13.4 % (ref 12.4–15.4)
GFR SERPL CREATININE-BSD FRML MDRD: >90 ML/MIN/1.73M2
GLUCOSE BLD-MCNC: 100 MG/DL (ref 70–99)
GLUCOSE BLD-MCNC: 104 MG/DL (ref 70–99)
GLUCOSE BLD-MCNC: 123 MG/DL (ref 70–99)
GLUCOSE BLD-MCNC: 171 MG/DL (ref 70–99)
GLUCOSE SERPL-MCNC: 139 MG/DL (ref 70–99)
GLUCOSE SERPL-MCNC: 173 MG/DL (ref 70–99)
GLUCOSE SERPL-MCNC: 95 MG/DL (ref 70–99)
HCT VFR BLD AUTO: 38.4 % (ref 36–48)
HGB BLD-MCNC: 11.9 G/DL (ref 12–16)
IMM GRANULOCYTES # BLD AUTO: 0.11 K/UL (ref 0–0.5)
IMM GRANULOCYTES NFR BLD: 1 %
LYMPHOCYTES NFR BLD: 1.66 K/UL (ref 1–5.1)
LYMPHOCYTES RELATIVE PERCENT: 16 %
MCH RBC QN AUTO: 27.8 PG (ref 26–34)
MCHC RBC AUTO-ENTMCNC: 31 G/DL (ref 31–36)
MCV RBC AUTO: 89.7 FL (ref 80–100)
MONOCYTES NFR BLD: 1.52 K/UL (ref 0–1.3)
MONOCYTES NFR BLD: 15 %
NEUTROPHILS NFR BLD: 63 %
NEUTS SEG NFR BLD: 6.49 K/UL (ref 1.7–7.7)
PLATELET # BLD AUTO: 223 K/UL (ref 135–450)
PMV BLD AUTO: 9.6 FL (ref 9.4–12.4)
POTASSIUM SERPL-SCNC: 4.1 MMOL/L (ref 3.5–5.1)
POTASSIUM SERPL-SCNC: 4.2 MMOL/L (ref 3.5–5.1)
POTASSIUM SERPL-SCNC: 4.3 MMOL/L (ref 3.5–5.1)
RBC # BLD AUTO: 4.28 M/UL (ref 4–5.2)
SODIUM SERPL-SCNC: 133 MMOL/L (ref 136–145)
SODIUM SERPL-SCNC: 134 MMOL/L (ref 136–145)
SODIUM SERPL-SCNC: 134 MMOL/L (ref 136–145)
TSH SERPL DL<=0.05 MIU/L-ACNC: 0.65 UIU/ML (ref 0.27–4.2)
WBC OTHER # BLD: 10.3 K/UL (ref 4–11)

## 2024-04-08 PROCEDURE — 84443 ASSAY THYROID STIM HORMONE: CPT

## 2024-04-08 PROCEDURE — 97162 PT EVAL MOD COMPLEX 30 MIN: CPT

## 2024-04-08 PROCEDURE — 82962 GLUCOSE BLOOD TEST: CPT

## 2024-04-08 PROCEDURE — 94761 N-INVAS EAR/PLS OXIMETRY MLT: CPT

## 2024-04-08 PROCEDURE — 6370000000 HC RX 637 (ALT 250 FOR IP): Performed by: INTERNAL MEDICINE

## 2024-04-08 PROCEDURE — 1200000000 HC SEMI PRIVATE

## 2024-04-08 PROCEDURE — 78452 HT MUSCLE IMAGE SPECT MULT: CPT

## 2024-04-08 PROCEDURE — 85025 COMPLETE CBC W/AUTO DIFF WBC: CPT

## 2024-04-08 PROCEDURE — 94640 AIRWAY INHALATION TREATMENT: CPT

## 2024-04-08 PROCEDURE — 36415 COLL VENOUS BLD VENIPUNCTURE: CPT

## 2024-04-08 PROCEDURE — 93306 TTE W/DOPPLER COMPLETE: CPT

## 2024-04-08 PROCEDURE — 2580000003 HC RX 258: Performed by: HOSPITALIST

## 2024-04-08 PROCEDURE — 97530 THERAPEUTIC ACTIVITIES: CPT

## 2024-04-08 PROCEDURE — 6370000000 HC RX 637 (ALT 250 FOR IP): Performed by: HOSPITALIST

## 2024-04-08 PROCEDURE — 6360000002 HC RX W HCPCS: Performed by: HOSPITALIST

## 2024-04-08 PROCEDURE — 97110 THERAPEUTIC EXERCISES: CPT

## 2024-04-08 PROCEDURE — 80048 BASIC METABOLIC PNL TOTAL CA: CPT

## 2024-04-08 PROCEDURE — 2700000000 HC OXYGEN THERAPY PER DAY

## 2024-04-08 PROCEDURE — 99233 SBSQ HOSP IP/OBS HIGH 50: CPT | Performed by: INTERNAL MEDICINE

## 2024-04-08 RX ORDER — CIPROFLOXACIN 500 MG/1
500 TABLET, FILM COATED ORAL EVERY 12 HOURS SCHEDULED
Qty: 8 TABLET | Refills: 0 | Status: SHIPPED | OUTPATIENT
Start: 2024-04-08 | End: 2024-04-12

## 2024-04-08 RX ORDER — METOPROLOL TARTRATE 50 MG/1
25 TABLET, FILM COATED ORAL 2 TIMES DAILY
Qty: 60 TABLET | Refills: 3 | Status: SHIPPED | OUTPATIENT
Start: 2024-04-08

## 2024-04-08 RX ADMIN — CLONAZEPAM 1 MG: 1 TABLET ORAL at 13:55

## 2024-04-08 RX ADMIN — SODIUM CHLORIDE, PRESERVATIVE FREE 10 ML: 5 INJECTION INTRAVENOUS at 09:36

## 2024-04-08 RX ADMIN — CLONAZEPAM 1 MG: 1 TABLET ORAL at 05:26

## 2024-04-08 RX ADMIN — INSULIN GLARGINE 34 UNITS: 100 INJECTION, SOLUTION SUBCUTANEOUS at 13:55

## 2024-04-08 RX ADMIN — ROSUVASTATIN 40 MG: 40 TABLET, FILM COATED ORAL at 20:17

## 2024-04-08 RX ADMIN — OXYCODONE 5 MG: 5 TABLET ORAL at 10:25

## 2024-04-08 RX ADMIN — LEVETIRACETAM 500 MG: 500 TABLET, FILM COATED ORAL at 20:16

## 2024-04-08 RX ADMIN — OXYCODONE 5 MG: 5 TABLET ORAL at 20:17

## 2024-04-08 RX ADMIN — LEVETIRACETAM 500 MG: 500 TABLET, FILM COATED ORAL at 09:35

## 2024-04-08 RX ADMIN — PANTOPRAZOLE SODIUM 40 MG: 40 TABLET, DELAYED RELEASE ORAL at 09:35

## 2024-04-08 RX ADMIN — CIPROFLOXACIN HYDROCHLORIDE 500 MG: 500 TABLET, FILM COATED ORAL at 20:17

## 2024-04-08 RX ADMIN — OXYCODONE 5 MG: 5 TABLET ORAL at 05:48

## 2024-04-08 RX ADMIN — Medication 1 TABLET: at 09:36

## 2024-04-08 RX ADMIN — GABAPENTIN 600 MG: 300 CAPSULE ORAL at 18:34

## 2024-04-08 RX ADMIN — ENOXAPARIN SODIUM 40 MG: 100 INJECTION SUBCUTANEOUS at 09:36

## 2024-04-08 RX ADMIN — BUPROPION HYDROCHLORIDE 150 MG: 150 TABLET, EXTENDED RELEASE ORAL at 20:17

## 2024-04-08 RX ADMIN — Medication 200 MG: at 09:35

## 2024-04-08 RX ADMIN — BUPROPION HYDROCHLORIDE 150 MG: 150 TABLET, EXTENDED RELEASE ORAL at 09:35

## 2024-04-08 RX ADMIN — HYDROMORPHONE HYDROCHLORIDE 0.25 MG: 1 INJECTION, SOLUTION INTRAMUSCULAR; INTRAVENOUS; SUBCUTANEOUS at 16:27

## 2024-04-08 RX ADMIN — IPRATROPIUM BROMIDE AND ALBUTEROL SULFATE 1 DOSE: 2.5; .5 SOLUTION RESPIRATORY (INHALATION) at 20:33

## 2024-04-08 RX ADMIN — CIPROFLOXACIN HYDROCHLORIDE 500 MG: 500 TABLET, FILM COATED ORAL at 09:35

## 2024-04-08 RX ADMIN — SODIUM CHLORIDE, PRESERVATIVE FREE 10 ML: 5 INJECTION INTRAVENOUS at 23:25

## 2024-04-08 RX ADMIN — CLONAZEPAM 1 MG: 1 TABLET ORAL at 20:16

## 2024-04-08 RX ADMIN — ASPIRIN 81 MG: 81 TABLET, COATED ORAL at 09:35

## 2024-04-08 RX ADMIN — GABAPENTIN 300 MG: 300 CAPSULE ORAL at 09:35

## 2024-04-08 ASSESSMENT — PAIN SCALES - GENERAL
PAINLEVEL_OUTOF10: 0
PAINLEVEL_OUTOF10: 6
PAINLEVEL_OUTOF10: 0
PAINLEVEL_OUTOF10: 9
PAINLEVEL_OUTOF10: 0
PAINLEVEL_OUTOF10: 9

## 2024-04-08 ASSESSMENT — PAIN DESCRIPTION - PAIN TYPE
TYPE: ACUTE PAIN

## 2024-04-08 ASSESSMENT — PAIN SCALES - WONG BAKER
WONGBAKER_NUMERICALRESPONSE: HURTS LITTLE MORE
WONGBAKER_NUMERICALRESPONSE: NO HURT
WONGBAKER_NUMERICALRESPONSE: HURTS LITTLE MORE
WONGBAKER_NUMERICALRESPONSE: NO HURT
WONGBAKER_NUMERICALRESPONSE: NO HURT

## 2024-04-08 ASSESSMENT — PAIN DESCRIPTION - FREQUENCY
FREQUENCY: INTERMITTENT

## 2024-04-08 ASSESSMENT — PAIN DESCRIPTION - LOCATION
LOCATION: BACK;HEAD
LOCATION: ABDOMEN
LOCATION: ABDOMEN

## 2024-04-08 ASSESSMENT — PAIN DESCRIPTION - ORIENTATION
ORIENTATION: LOWER
ORIENTATION: RIGHT
ORIENTATION: RIGHT

## 2024-04-08 ASSESSMENT — PAIN DESCRIPTION - DESCRIPTORS
DESCRIPTORS: ACHING

## 2024-04-08 ASSESSMENT — PAIN - FUNCTIONAL ASSESSMENT
PAIN_FUNCTIONAL_ASSESSMENT: ACTIVITIES ARE NOT PREVENTED

## 2024-04-08 ASSESSMENT — PAIN DESCRIPTION - ONSET
ONSET: ON-GOING
ONSET: PROGRESSIVE
ONSET: PROGRESSIVE

## 2024-04-08 NOTE — ACP (ADVANCE CARE PLANNING)
Advance Care Planning     Advance Care Planning Inpatient Note  [facility] Department    Today's Date: 4/8/2024  Unit: [facility] 3 PROGRESSIVE CARE    Received request from HealthCare Provider.  Upon review of chart and communication with care team, patient's decision making abilities are not in question.. Patient was/were present in the room during visit.    Goals of ACP Conversation:  Patient requested Healthcare power of  be left and completed at a later time.    Health Care Decision Makers:     No healthcare decision makers have been documented.  Click here to complete HealthCare Decision Makers including selection of the Healthcare Decision Maker Relationship (ie \"Primary\")  Summary:  No Decision Maker named by patient at this time    Advance Care Planning Documents (Patient Wishes):  Patient requested Healthcare power of  be left and completed at a later time.       Interventions:  Provided education on documents for clarity and greater understanding  Discussed and provided education on state decision maker hierarchy  Reviewed but did not complete ACP document    Care Preferences Communicated:   Patient requested Healthcare power of  be left and completed at a later time.    Outcomes/Plan:  Patient requested Healthcare power of  be left and completed at a later time.    Electronically signed by CLAUDY Gardner on 4/8/2024 at 4:28 PM

## 2024-04-08 NOTE — DISCHARGE INSTR - COC
Continuity of Care Form    Patient Name: Viji Gamez   :  1950  MRN:  [id number]    Admit date:  2024  Discharge date:  ***    Code Status Order: Full Code   Advance Directives:     Admitting Physician:  Chris Mukherjee MD  PCP: No primary care provider on file.    Discharging Nurse: ***  Discharging Hospital Unit/Room#: 3202/3202-01  Discharging Unit Phone Number: ***    Emergency Contact:   Extended Emergency Contact Information  Primary Emergency Contact: Elba Greene  Home Phone: [phone]  Relation: Child  Secondary Emergency Contact: Jessa Austin  Home Phone: [phone]  Relation: Brother/Sister    Past Surgical History:  History reviewed. No pertinent surgical history.    Immunization History:     There is no immunization history on file for this patient.    Active Problems:  Patient Active Problem List   Diagnosis Code    Sepsis (HCC) A41.9    Hyponatremia E87.1       Isolation/Infection:   Isolation            C Diff Contact          Patient Infection Status       Infection Onset Added Last Indicated Last Indicated By Review Planned Expiration Resolved Resolved By    C-diff Rule Out 24 Clostridium Difficile Toxin/Antigen (Ordered) 04/12/24 04/15/24                         Nurse Assessment:  Last Vital Signs: /62   Pulse 77   Temp 97.5 °F (36.4 °C) (Oral)   Resp 18   Ht 1.524 m (5')   Wt 57.2 kg (126 lb)   SpO2 100%   BMI 24.61 kg/m²     Last documented pain score (0-10 scale): Pain Level: 0  Last Weight:   Wt Readings from Last 1 Encounters:   24 57.2 kg (126 lb)     Mental Status:  {IP PT MENTAL STATUS:}    IV Access:  { MATHEW IV ACCESS:963466319}    Nursing Mobility/ADLs:  Walking   {CHP DME ADLs:426321088}  Transfer  {CHP DME ADLs:056374580}  Bathing  {CHP DME ADLs:715091799}  Dressing  {CHP DME ADLs:617985593}  Toileting  {CHP DME ADLs:617282046}  Feeding  {CHP DME ADLs:728927439}  Med Admin  {CHP DME ADLs:466658384}  Med Delivery

## 2024-04-08 NOTE — PLAN OF CARE
Problem: Safety - Adult  Goal: Free from fall injury  4/8/2024 0949 by Anny Mcqueen RN  Outcome: Progressing  4/8/2024 0456 by Abigail Sal RN  Outcome: Progressing     Problem: Discharge Planning  Goal: Discharge to home or other facility with appropriate resources  4/8/2024 0949 by Anny Mcqueen RN  Outcome: Progressing  4/8/2024 0456 by Abigail Sal RN  Outcome: Progressing     Problem: Pain  Goal: Verbalizes/displays adequate comfort level or baseline comfort level  4/8/2024 0949 by Anny Mcqueen RN  Outcome: Progressing  4/8/2024 0456 by Abigail Sal RN  Outcome: Progressing     Problem: Skin/Tissue Integrity  Goal: Absence of new skin breakdown  Description: 1.  Monitor for areas of redness and/or skin breakdown  2.  Assess vascular access sites hourly  3.  Every 4-6 hours minimum:  Change oxygen saturation probe site  4.  Every 4-6 hours:  If on nasal continuous positive airway pressure, respiratory therapy assess nares and determine need for appliance change or resting period.  4/8/2024 0949 by Anny Mcqueen RN  Outcome: Progressing  4/8/2024 0456 by Abigail Sal RN  Outcome: Progressing     Problem: ABCDS Injury Assessment  Goal: Absence of physical injury  4/8/2024 0949 by Anny Mcqueen RN  Outcome: Progressing  4/8/2024 0456 by Abigail Sal RN  Outcome: Progressing

## 2024-04-09 VITALS
OXYGEN SATURATION: 100 % | HEIGHT: 60 IN | SYSTOLIC BLOOD PRESSURE: 137 MMHG | BODY MASS INDEX: 24.74 KG/M2 | TEMPERATURE: 97.5 F | HEART RATE: 71 BPM | DIASTOLIC BLOOD PRESSURE: 99 MMHG | RESPIRATION RATE: 18 BRPM | WEIGHT: 126 LBS

## 2024-04-09 LAB
GLUCOSE BLD-MCNC: 217 MG/DL (ref 70–99)
GLUCOSE BLD-MCNC: 68 MG/DL (ref 70–99)
GLUCOSE BLD-MCNC: 86 MG/DL (ref 70–99)

## 2024-04-09 PROCEDURE — 3430000000 HC RX DIAGNOSTIC RADIOPHARMACEUTICAL: Performed by: INTERNAL MEDICINE

## 2024-04-09 PROCEDURE — 2700000000 HC OXYGEN THERAPY PER DAY

## 2024-04-09 PROCEDURE — 6360000002 HC RX W HCPCS: Performed by: HOSPITALIST

## 2024-04-09 PROCEDURE — 94761 N-INVAS EAR/PLS OXIMETRY MLT: CPT

## 2024-04-09 PROCEDURE — 6370000000 HC RX 637 (ALT 250 FOR IP): Performed by: INTERNAL MEDICINE

## 2024-04-09 PROCEDURE — A9502 TC99M TETROFOSMIN: HCPCS | Performed by: INTERNAL MEDICINE

## 2024-04-09 PROCEDURE — 97530 THERAPEUTIC ACTIVITIES: CPT

## 2024-04-09 PROCEDURE — 6360000002 HC RX W HCPCS: Performed by: INTERNAL MEDICINE

## 2024-04-09 PROCEDURE — 97110 THERAPEUTIC EXERCISES: CPT

## 2024-04-09 PROCEDURE — 97166 OT EVAL MOD COMPLEX 45 MIN: CPT

## 2024-04-09 PROCEDURE — 82962 GLUCOSE BLOOD TEST: CPT

## 2024-04-09 PROCEDURE — 97535 SELF CARE MNGMENT TRAINING: CPT

## 2024-04-09 PROCEDURE — 2580000003 HC RX 258: Performed by: HOSPITALIST

## 2024-04-09 PROCEDURE — 6370000000 HC RX 637 (ALT 250 FOR IP): Performed by: HOSPITALIST

## 2024-04-09 PROCEDURE — 99233 SBSQ HOSP IP/OBS HIGH 50: CPT | Performed by: INTERNAL MEDICINE

## 2024-04-09 PROCEDURE — 93017 CV STRESS TEST TRACING ONLY: CPT

## 2024-04-09 RX ORDER — ROPINIROLE 0.25 MG/1
0.25 TABLET, FILM COATED ORAL 2 TIMES DAILY
Qty: 60 TABLET | Refills: 0 | Status: SHIPPED | OUTPATIENT
Start: 2024-04-09

## 2024-04-09 RX ORDER — DONEPEZIL HYDROCHLORIDE 5 MG/1
5 TABLET, FILM COATED ORAL NIGHTLY
Qty: 30 TABLET | Refills: 0 | Status: SHIPPED | OUTPATIENT
Start: 2024-04-09

## 2024-04-09 RX ORDER — LISINOPRIL 10 MG/1
10 TABLET ORAL DAILY
Qty: 90 TABLET | Refills: 1 | Status: SHIPPED | OUTPATIENT
Start: 2024-04-09

## 2024-04-09 RX ORDER — REGADENOSON 0.08 MG/ML
0.4 INJECTION, SOLUTION INTRAVENOUS
Status: COMPLETED | OUTPATIENT
Start: 2024-04-09 | End: 2024-04-09

## 2024-04-09 RX ORDER — HYDROXYZINE HYDROCHLORIDE 25 MG/1
25 TABLET, FILM COATED ORAL EVERY 8 HOURS PRN
Qty: 90 TABLET | Refills: 0 | Status: SHIPPED
Start: 2024-04-09 | End: 2024-05-09

## 2024-04-09 RX ORDER — ALENDRONATE SODIUM 70 MG/1
70 TABLET ORAL
Qty: 13 TABLET | Refills: 0 | Status: SHIPPED | OUTPATIENT
Start: 2024-04-09

## 2024-04-09 RX ORDER — DULOXETIN HYDROCHLORIDE 30 MG/1
30 CAPSULE, DELAYED RELEASE ORAL 2 TIMES DAILY
Qty: 60 CAPSULE | Refills: 0 | Status: SHIPPED | OUTPATIENT
Start: 2024-04-09

## 2024-04-09 RX ORDER — FERROUS SULFATE 325(65) MG
325 TABLET ORAL
Qty: 90 TABLET | Refills: 1 | Status: SHIPPED | OUTPATIENT
Start: 2024-04-09

## 2024-04-09 RX ORDER — LEVETIRACETAM 500 MG/1
500 TABLET ORAL 2 TIMES DAILY
Qty: 60 TABLET | Refills: 0 | Status: SHIPPED | OUTPATIENT
Start: 2024-04-09

## 2024-04-09 RX ORDER — OXYCODONE HYDROCHLORIDE AND ACETAMINOPHEN 5; 325 MG/1; MG/1
1 TABLET ORAL EVERY 8 HOURS PRN
Qty: 9 TABLET | Refills: 0 | Status: SHIPPED | OUTPATIENT
Start: 2024-04-09 | End: 2024-04-12

## 2024-04-09 RX ADMIN — REGADENOSON 0.4 MG: 0.08 INJECTION, SOLUTION INTRAVENOUS at 09:11

## 2024-04-09 RX ADMIN — TETROFOSMIN 38.1 MILLICURIE: 1.38 INJECTION, POWDER, LYOPHILIZED, FOR SOLUTION INTRAVENOUS at 09:11

## 2024-04-09 RX ADMIN — CLONAZEPAM 1 MG: 1 TABLET ORAL at 05:36

## 2024-04-09 RX ADMIN — SODIUM CHLORIDE, PRESERVATIVE FREE 10 ML: 5 INJECTION INTRAVENOUS at 10:27

## 2024-04-09 RX ADMIN — ASPIRIN 81 MG: 81 TABLET, COATED ORAL at 10:23

## 2024-04-09 RX ADMIN — BUPROPION HYDROCHLORIDE 150 MG: 150 TABLET, EXTENDED RELEASE ORAL at 10:23

## 2024-04-09 RX ADMIN — ENOXAPARIN SODIUM 40 MG: 100 INJECTION SUBCUTANEOUS at 10:23

## 2024-04-09 RX ADMIN — CLONAZEPAM 1 MG: 1 TABLET ORAL at 11:11

## 2024-04-09 RX ADMIN — Medication 1 TABLET: at 10:23

## 2024-04-09 RX ADMIN — LEVETIRACETAM 500 MG: 500 TABLET, FILM COATED ORAL at 10:23

## 2024-04-09 RX ADMIN — PANTOPRAZOLE SODIUM 40 MG: 40 TABLET, DELAYED RELEASE ORAL at 10:23

## 2024-04-09 RX ADMIN — Medication 200 MG: at 10:23

## 2024-04-09 RX ADMIN — GABAPENTIN 300 MG: 300 CAPSULE ORAL at 10:22

## 2024-04-09 RX ADMIN — TETROFOSMIN 10.73 MILLICURIE: 1.38 INJECTION, POWDER, LYOPHILIZED, FOR SOLUTION INTRAVENOUS at 08:15

## 2024-04-09 RX ADMIN — HYDROMORPHONE HYDROCHLORIDE 0.25 MG: 1 INJECTION, SOLUTION INTRAMUSCULAR; INTRAVENOUS; SUBCUTANEOUS at 10:28

## 2024-04-09 RX ADMIN — CIPROFLOXACIN HYDROCHLORIDE 500 MG: 500 TABLET, FILM COATED ORAL at 10:23

## 2024-04-09 ASSESSMENT — PAIN - FUNCTIONAL ASSESSMENT
PAIN_FUNCTIONAL_ASSESSMENT: ACTIVITIES ARE NOT PREVENTED
PAIN_FUNCTIONAL_ASSESSMENT: ACTIVITIES ARE NOT PREVENTED

## 2024-04-09 ASSESSMENT — PAIN DESCRIPTION - ONSET
ONSET: ON-GOING
ONSET: AWAKENED FROM SLEEP

## 2024-04-09 ASSESSMENT — PAIN DESCRIPTION - DESCRIPTORS
DESCRIPTORS: SORE
DESCRIPTORS: THROBBING;ACHING

## 2024-04-09 ASSESSMENT — PAIN DESCRIPTION - PAIN TYPE
TYPE: ACUTE PAIN;CHRONIC PAIN
TYPE: ACUTE PAIN

## 2024-04-09 ASSESSMENT — PAIN DESCRIPTION - LOCATION
LOCATION: THROAT
LOCATION: NECK;HEAD

## 2024-04-09 ASSESSMENT — PAIN SCALES - GENERAL
PAINLEVEL_OUTOF10: 8
PAINLEVEL_OUTOF10: 2

## 2024-04-09 ASSESSMENT — PAIN DESCRIPTION - FREQUENCY
FREQUENCY: CONTINUOUS
FREQUENCY: CONTINUOUS

## 2024-04-09 NOTE — PLAN OF CARE
Problem: Chronic Conditions and Co-morbidities  Goal: Patient's chronic conditions and co-morbidity symptoms are monitored and maintained or improved  Outcome: Progressing     Problem: Safety - Adult  Goal: Free from fall injury  Outcome: Progressing     Problem: Neurosensory - Adult  Goal: Absence of seizures  Outcome: Progressing  Goal: Remains free of injury related to seizures activity  Outcome: Progressing     Problem: Respiratory - Adult  Goal: Achieves optimal ventilation and oxygenation  Outcome: Progressing     Problem: Genitourinary - Adult  Goal: Absence of urinary retention  Outcome: Progressing     Problem: Infection - Adult  Goal: Absence of infection at discharge  Outcome: Progressing

## 2024-04-09 NOTE — PROGRESS NOTES
04/06/24 0810   RT Protocol   History Pulmonary Disease 2   Respiratory pattern 0   Breath sounds 2   Cough 0   Indications for Bronchodilator Therapy Decreased or absent breath sounds   Bronchodilator Assessment Score 4       
  [facility] Daily Progress Note      Admit Date:  4/5/2024      Cardiology consult: sepsis, Afib     Subjective:  Ms. Gamez states that she is feeling little better.  Chest discomfort is less.  She does note some increased left sided abdominal discomfort.      History of present illness:   73-year-old female with multiple chronic medical issues who presents with abdominal pain confusion and rapid atrial fibrillation.  We were asked to see her for afib and elevated troponin. She does have multiple chronic medical issues.  She has COPD on chronic O2 by nasal cannula at 3 L.  She has chronic diastolic heart failure and prior CVA with chronic atrial fibrillation.  She has prior Watchman placement and is not on anticoagulation.     Objective:   BP (!) 166/70   Pulse 78   Temp 97.3 °F (36.3 °C) (Oral)   Resp 16   Ht 1.524 m (5')   Wt 57.2 kg (126 lb)   SpO2 100%   BMI 24.61 kg/m²     Intake/Output Summary (Last 24 hours) at 4/9/2024 0905  Last data filed at 4/9/2024 0401  Gross per 24 hour   Intake --   Output 1775 ml   Net -1775 ml         Physical Exam:  General:  Awake, alert, oriented x 3, NAD  Skin:  Warm and dry  Neck:  JVD normal  Chest:  decreased air exchange bilaterally  Cardiovascular:  RRR S1S2, no S3, 2/6 systolic murmur  Abdomen:  Soft, ND, NT, No HSM  Extremities:  No edema  Chest wall:  ++ Tenderness to palpation left chest and chondral borders    Medications:    ciprofloxacin  500 mg Oral 2 times per day    pantoprazole  40 mg Oral Daily    sodium chloride  250 mL IntraVENous Once    enoxaparin  40 mg SubCUTAneous Daily    polyethylene glycol  17 g Oral Daily    aspirin  81 mg Oral Daily    buPROPion  150 mg Oral BID    gabapentin  300 mg Oral QAM    gabapentin  600 mg Oral QPM    insulin glargine  34 Units SubCUTAneous Daily    levETIRAcetam  500 mg Oral BID    rosuvastatin  40 mg Oral Nightly    therapeutic multivitamin-minerals  1 tablet Oral Daily    sodium chloride flush  5-40 mL 
  [facility] Daily Progress Note      Admit Date:  4/5/2024      Cardiology consult: sepsis, Afib     Subjective:  Ms. Gamez has no new complaints..  Describes intermittent chest and abdominal discomfort.  Lives alone but does have aides to help.  Says her memory is not as good so good.  Patient is new to me this admission.      History of present illness:   73-year-old female with multiple chronic medical issues who presents with abdominal pain confusion and rapid atrial fibrillation.  We were asked to see her for afib and elevated troponin. She does have multiple chronic medical issues.  She has COPD on chronic O2 by nasal cannula at 3 L.  She has chronic diastolic heart failure and prior CVA with chronic atrial fibrillation.  She has prior Watchman placement and is not on anticoagulation.     Objective:   BP (!) 144/97   Pulse 61   Temp 98.1 °F (36.7 °C) (Oral)   Resp 16   Ht 1.524 m (5')   Wt 57.2 kg (126 lb)   SpO2 92%   BMI 24.61 kg/m²     Intake/Output Summary (Last 24 hours) at 4/8/2024 1823  Last data filed at 4/8/2024 1633  Gross per 24 hour   Intake 480 ml   Output 2950 ml   Net -2470 ml       Physical Exam:  General:  Awake, alert, oriented x 3, NAD  Skin:  Warm and dry  Neck:  JVD normal  Chest:  decreased air exchange bilaterally  Cardiovascular:  RRR S1S2, no S3, 2/6 systolic murmur  Abdomen:  Soft, ND, NT, No HSM  Extremities:  No edema    Medications:    ciprofloxacin  500 mg Oral 2 times per day    pantoprazole  40 mg Oral Daily    sodium chloride  250 mL IntraVENous Once    enoxaparin  40 mg SubCUTAneous Daily    polyethylene glycol  17 g Oral Daily    aspirin  81 mg Oral Daily    buPROPion  150 mg Oral BID    gabapentin  300 mg Oral QAM    gabapentin  600 mg Oral QPM    insulin glargine  34 Units SubCUTAneous Daily    levETIRAcetam  500 mg Oral BID    rosuvastatin  40 mg Oral Nightly    therapeutic multivitamin-minerals  1 tablet Oral Daily    sodium chloride flush  5-40 mL 
4 Eyes Skin Assessment     NAME:  Viji Gamez  YOB: 1950  MEDICAL RECORD NUMBER:  [id number]    The patient is being assessed for  Admission    I agree that at least one RN has performed a thorough Head to Toe Skin Assessment on the patient. ALL assessment sites listed below have been assessed.      Areas assessed by both nurses:    Head, Face, Ears, Shoulders, Back, Chest, Arms, Elbows, Hands, Sacrum. Buttock, Coccyx, Ischium, Legs. Feet and Heels, and Under Medical Devices         Does the Patient have a Wound? No noted wound(s)       Reza Prevention initiated by RN: Yes  Wound Care Orders initiated by RN: No    Pressure Injury (Stage 3,4, Unstageable, DTI, NWPT, and Complex wounds) if present, place Wound referral order by RN under : No    New Ostomies, if present place, Ostomy referral order under : No     Nurse 1 eSignature: Electronically signed by Anny Mcqueen RN on 4/5/24 at 4:47 PM EDT    **SHARE this note so that the co-signing nurse can place an eSignature**    Nurse 2 eSignature: Electronically signed by LENA LEA RN on 4/5/24 at 7:37 PM EDT    
Blood sugar recheck is 217.    
[facility]    Pharmacy IV to PO Conversion Communication     IV To Po Conversion:   Will convert pantoprazole 40mgm IV daily to pantoprazole 40mg PO daily based on [facility] IV to PO policy (see below).     Please call with questions.  Geoffrey GarciaD, [facility]      Criteria for conversion from IV to PO therapy per [facility] IV to PO Protocol:   Patients should meet all of the following inclusion criteria and none of the exclusion criteria    Criteria to initiate medication route switch (Inclusion Criteria)  IV therapy for > 24 hours (antibiotics only)  Tolerating diet more advanced than clear liquids  Tolerating oral (PO) medications  Does not require vasopressor therapy for blood pressure support  No seizures for 72hrs (antiepileptic medications only)      Criteria indicating that the patient is NOT a candidate for IV to PO conversion (Exclusion Criteria)  Infections requiring IV therapy (ie: meningitis, endocarditis, osteomyelitis, pancreatitis)  Nausea and/or vomiting or severe diarrhea within past 24 hours  Has gastrectomy, ileus, gastric outlet or bowel obstruction, or malabsorption syndromes  Has significant, painful oral ulceration  TPN with an NPO order  Active GI bleed  Unable to swallow  Nothing by Mouth (NPO) status  Febrile in the last 24 hours- (antibiotics only)  Clinical deteriorating or unstable - (antibiotics only)  Pediatric patients and patients who are not euthyroid (not on oral levothyroxine/not stabilized on oral levothyroxine) - (Levothyroxine only)  Patients being treated for myxedema coma or during the organ donation process - (Levothyroxine only)    Other notes-   Patients may be given suppositories when available for the product being ordered if no contraindications exist (ie: rectal surgery or infection)   Oral solutions/suspensions may be considered for patients with a functioning enteral tube not on continuous suction (if medication is available in this formulation)   
Discharge instructions reviewed with patient. All questions answered, no further questions at this time. Home oxygen at bedside.  Belongings packed per pt and pca. IV removed, no complications. Patient escorted out to friend to transport home, no further needs at this time.   
[facility]  Inpatient Progress  Note    Reason for Consult:  Hyponatremia  Requesting Physician:  Dr. SVETA Mukherjee    CHIEF COMPLAINT:      History obtained from records and patient.    HISTORY OF PRESENT ILLNESS:                Viji Gamez  is 73 y.o. y.o. female with significant past medical history of DM 2, HTN, A. Fib, COPD, Diastolic CHF, S/P Watchman Device, CVA, Anxiety, Alcohol Abuse who presents with confusion, nausea and abdominal pain.   Admitted for possible UTI/Sepsis and hyponatremia.  I am asked to see the patient since Na was 123 4/5/24 and now at 129.  12/2023 Na was 138.  On Bupropion and Duloxetine as an outpatient.  SBP 's.  -2.3L since admission.   Uosm 251 with Nilam less than 20.    H/O Seizures.     H/O alcohol     Subjective / interval history / nephrology update / medical decision making:   Refer to assessment and plan for more details.   Patient was seen comfortably sitting up in bed,   Reported no active complaints/distress,   Renal labs noted .    Past Medical History:     has a past medical history of A-fib (HCC), COPD (chronic obstructive pulmonary disease) (HCC), Diabetes mellitus (HCC), and Hypertension.   Past Surgical History:     has no past surgical history on file.   Current Medications:    Current Facility-Administered Medications: ciprofloxacin (CIPRO) tablet 500 mg, 500 mg, Oral, 2 times per day  pantoprazole (PROTONIX) tablet 40 mg, 40 mg, Oral, Daily  sodium chloride 0.9 % bolus 250 mL, 250 mL, IntraVENous, Once  sodium chloride flush 0.9 % injection 5-40 mL, 5-40 mL, IntraVENous, PRN  potassium chloride (KLOR-CON M) extended release tablet 40 mEq, 40 mEq, Oral, PRN **OR** potassium bicarb-citric acid (EFFER-K) effervescent tablet 40 mEq, 40 mEq, Oral, PRN **OR** potassium chloride 10 mEq/100 mL IVPB (Peripheral Line), 10 mEq, IntraVENous, PRN  magnesium sulfate 2000 mg in 50 mL IVPB premix, 2,000 mg, IntraVENous, PRN  enoxaparin (LOVENOX) 
Notified attending and CC of BP trend and poor urine output. Dr. iYng states ok to leave walters in until urine output improves.   
Occupational Therapy    [facility] - Rehabilitation Department       Occupational Therapy  3202/3202-01  [facility] 3 PROGRESSIVE CARE    [x] Initial Evaluation            [x] Daily Treatment Note         [] Discharge Summary      Patient: Viji Gamez   : 1950   MRN: [id number]   Date of Service:  2024    Admitting Diagnosis:  Sepsis (HCC)  Referring Physician: Chris Muhkerjee MD   Current Admission Summary: 73 y.o. female who p/w shortness of breath, chest pain and abdominal pain.    24 CXR: cardiomegaly; CTH: atrophy, small vessel disease, chronic maxillary sinusitis; CT abdomen/pelvis: cholelithiasics, distended bladder, aortic atherosclerosis, hiatal hernia, sigmoid diverticulitis; US gallbladder: cholelithiasis   Past Medical History:  has a past medical history of A-fib (HCC), COPD (chronic obstructive pulmonary disease) (HCC), Diabetes mellitus (HCC), and Hypertension.  Past Surgical History:  has no past surgical history on file.      Discharge Recommendations: SNF vs home with 24 hrA/HHOT     AM-PAC Inpatient Daily Activity Raw Score: 17    Therapy discharge recommendations take into account each patient's current medical complexities and are subject to input/oversight from the patient's healthcare team.     Barriers to Home Discharge:   [] Steps to access home entry or bed/bath:   [] Unable to transfer, ambulate, or propel wheelchair household distances without assist   [] Limited available assist at home upon discharge    [] Patient or family requests d/c to post-acute facility    [] Poor cognition/safety awareness for d/c to home alone    []Unable to maintain ordered weight bearing status    [] Patient with salient signs of long-standing immobility   [] Patient is at risk for falls due to:   [] Other:    If pt is unable to be seen after this session, please let this note serve as discharge summary.  Please see case management note for discharge disposition.  Thank you.    DME 
Patient admitted to room 3202 from ICU. Patient oriented to room, call light, bed rails, phone, lights and bathroom. Patient instructed about the schedule of the day including: vital sign frequency, lab draws, possible tests, frequency of MD and staff rounds, daily weights, I &O's and prescribed diet. Telemetry box in place, patient aware of placement and reason. Bed locked, in lowest position, side rails up 2/4, call light within reach.        Recliner Assessment  Patient is able to demonstrate the ability to move from a reclining position to an upright position within the recliner.       4 Eyes Skin Assessment     NAME:  Viji Gamez  YOB: 1950  MEDICAL RECORD NUMBER:  [id number]    The patient is being assessed for  Transfer to New Unit from ICU    I agree that at least one RN has performed a thorough Head to Toe Skin Assessment on the patient. ALL assessment sites listed below have been assessed.      Areas assessed by both nurses:    Head, Face, Ears, Shoulders, Back, Chest, Arms, Elbows, Hands, Sacrum. Buttock, Coccyx, Ischium, Legs. Feet and Heels, and Under Medical Devices         Does the Patient have a Wound? No noted wound(s)     Redness to bottom, no open wounds, redness to bilat feet.   Reza Prevention initiated by RN: No  Wound Care Orders initiated by RN: No    Pressure Injury (Stage 3,4, Unstageable, DTI, NWPT, and Complex wounds) if present, place Wound referral order by RN under : No    New Ostomies, if present place, Ostomy referral order under : No     Nurse 1 eSignature: Electronically signed by SAMRA GAONA RN on 4/6/24 at 2:32 PM EDT    **SHARE this note so that the co-signing nurse can place an eSignature**    Nurse 2 eSignature: Electronically signed by Mirela Parr RN on 4/6/24 at 3:33 PM EDT   
Patient assisted up into chair with assist x2 with walker to chair, patient heart rate is 74 while sitting up in chair. Patient with no further concerns at this time. Call light within reach and chair alarm on.   SAMRA GAONA RN   
Pt completed stress portion of cardiac stress test. Patient complained of SOB, head pressure and stomach cramping that resolved. Pt is discharged to nuclear department for final scan. Nuclear tech will remove PIV. Discharge instructions given to pt. Pt verbalizes understanding to discharge instructions.   
Pt educated on cardiac stress testing. Pt verbalizes understanding to cardiac stress testing. Questions and concerns addressed. Pt is agreeable to proceed with stress testing.   
Report given to Jose Manuel DORADO. Pt. Stable. Care transferred at this time.    
Report given to Latoya on PCU. Answered all questions. Pt is awake, more alert, finished lunch. BP improved after bolus.   
Stress test is unremarkable.  Patient can be discharged home from cardiology standpoint.  We will sign off, please call if we can be of further assistance.  
This RN received verbal order from MD to MEGGAN c.diff isolation orders d/t patient not having a bowel movement.   
This RN was present and participated in the care of this patient during her time today in the ICU.  Betzaida Ravi, BSN, RN  
H2 Blocker,  [] Carafate,  [] Diet/Tube Feeds   Code Status Full Code   Disposition Patient requires continued admission due to hypoglycemia   MDM [] Low, [] Moderate,[x]  High  Patient's risk as above      History of Present Illness:     Chief Complaint: Sepsis (HCC)    Patient seen and examined this morning.  Sitting in the chair.  Denies any chest pain or shortness of breath.    Objective:     Intake/Output Summary (Last 24 hours) at 4/9/2024 0740  Last data filed at 4/9/2024 0401  Gross per 24 hour   Intake 120 ml   Output 2350 ml   Net -2230 ml      Vitals:   Vitals:    04/09/24 0401   BP: (!) 124/95   Pulse: 72   Resp: 18   Temp: 97.9 °F (36.6 °C)   SpO2: 100%     Physical Exam:   General-no acute distress  CNS-AOx3, no focal deficit.  Medications:   Medications:    ciprofloxacin  500 mg Oral 2 times per day    pantoprazole  40 mg Oral Daily    sodium chloride  250 mL IntraVENous Once    enoxaparin  40 mg SubCUTAneous Daily    polyethylene glycol  17 g Oral Daily    aspirin  81 mg Oral Daily    buPROPion  150 mg Oral BID    gabapentin  300 mg Oral QAM    gabapentin  600 mg Oral QPM    insulin glargine  34 Units SubCUTAneous Daily    levETIRAcetam  500 mg Oral BID    rosuvastatin  40 mg Oral Nightly    therapeutic multivitamin-minerals  1 tablet Oral Daily    sodium chloride flush  5-40 mL IntraVENous 2 times per day    thiamine  200 mg Oral Daily    clonazePAM  1 mg Oral Q8H      Infusions:    sodium chloride 10 mL/hr at 04/06/24 2130    dextrose       PRN Meds: sodium chloride flush, 5-40 mL, PRN  potassium chloride, 40 mEq, PRN   Or  potassium alternative oral replacement, 40 mEq, PRN   Or  potassium chloride, 10 mEq, PRN  magnesium sulfate, 2,000 mg, PRN  ondansetron, 4 mg, Q8H PRN   Or  ondansetron, 4 mg, Q6H PRN  melatonin, 3 mg, Nightly PRN  senna, 1 tablet, BID PRN  aluminum & magnesium hydroxide-simethicone, 30 mL, Q6H PRN  albuterol sulfate HFA, 2 puff, Q6H PRN  sodium chloride flush, 5-40 mL, 
per chart and lack of cardiology f/u  All rate control meds stopped  Dig level=0.5     Abd Pain:  Mostly resolved today  Medicine following     Sepsis:  Likely UTI  On antibiotics.  Further plan per medicine  Much better     Anemia:  Hgb  16.7=>12.8=>11.2  Suspect dilutional  No evidence of bleeding  Follow closely    Plan:  Follow HR off of rate control meds  Add back if needed          Edin Waite MD, 4/7/2024 7:38 AM       
Oral Daily    aspirin  81 mg Oral Daily    buPROPion  150 mg Oral BID    gabapentin  300 mg Oral QAM    gabapentin  600 mg Oral QPM    insulin glargine  34 Units SubCUTAneous Daily    levETIRAcetam  500 mg Oral BID    rosuvastatin  40 mg Oral Nightly    therapeutic multivitamin-minerals  1 tablet Oral Daily    sodium chloride flush  5-40 mL IntraVENous 2 times per day    thiamine  200 mg Oral Daily    clonazePAM  1 mg Oral Q8H      Infusions:    sodium chloride 10 mL/hr at 04/06/24 2130    dextrose         PRN Meds: sodium chloride flush, 5-40 mL, PRN  potassium chloride, 40 mEq, PRN   Or  potassium alternative oral replacement, 40 mEq, PRN   Or  potassium chloride, 10 mEq, PRN  magnesium sulfate, 2,000 mg, PRN  ondansetron, 4 mg, Q8H PRN   Or  ondansetron, 4 mg, Q6H PRN  melatonin, 3 mg, Nightly PRN  senna, 1 tablet, BID PRN  aluminum & magnesium hydroxide-simethicone, 30 mL, Q6H PRN  albuterol sulfate HFA, 2 puff, Q6H PRN  sodium chloride flush, 5-40 mL, PRN  sodium chloride, , PRN  acetaminophen, 650 mg, Q6H PRN   Or  acetaminophen, 650 mg, Q6H PRN  oxyCODONE, 5 mg, Q4H PRN  LORazepam, 1 mg, Q1H PRN   Or  LORazepam, 1 mg, Q1H PRN   Or  LORazepam, 2 mg, Q1H PRN   Or  LORazepam, 2 mg, Q1H PRN   Or  LORazepam, 3 mg, Q1H PRN   Or  LORazepam, 3 mg, Q1H PRN   Or  LORazepam, 4 mg, Q1H PRN   Or  LORazepam, 4 mg, Q1H PRN  glucose, 4 tablet, PRN  dextrose bolus, 125 mL, PRN   Or  dextrose bolus, 250 mL, PRN  glucagon (rDNA), 1 mg, PRN  dextrose, , Continuous PRN  HYDROmorphone, 0.25 mg, Q4H PRN  ipratropium 0.5 mg-albuterol 2.5 mg, 1 Dose, Q4H PRN        LABS:  CBC:   Recent Labs     04/06/24  0409 04/07/24  0146 04/08/24  0200   WBC 10.6 8.4 10.3   HGB 12.8 11.2* 11.9*    233 223       BMP:    Recent Labs     04/08/24  0200 04/08/24  0741 04/08/24  1349   * 134* 133*   K 4.3 4.2 4.1    101 100   CO2 23 25 24   BUN 26* 23* 19   CREATININE 0.6 0.6 0.6   GLUCOSE 95 139* 173*       Hepatic:   No results for 
metFORMIN (GLUCOPHAGE-XR) 500 mg, Oral, 2 TIMES DAILY    metoprolol tartrate (LOPRESSOR) 50 mg, Oral, 2 TIMES DAILY    mometasone-formoterol (DULERA) 200-5 MCG/ACT inhaler 2 puffs, Inhalation, EVERY 12 HOURS    Multiple Vitamins-Minerals (THERAPEUTIC MULTIVITAMIN-MINERALS) tablet 1 tablet, Oral, DAILY    oxyCODONE-acetaminophen (PERCOCET) 5-325 MG per tablet 1 tablet, Oral, EVERY 8 HOURS PRN    pantoprazole (PROTONIX) 40 mg, Oral, DAILY    rOPINIRole (REQUIP) 0.25 mg, Oral, 2 times daily    rosuvastatin (CRESTOR) 40 mg, Oral, EVERY EVENING    tiotropium (SPIRIVA) 18 mcg, Inhalation, DAILY       Please call with any questions.    Lincoln Cardona PharmD  [facility]   [id number]  4/5/2024   2:19 PM      
mg Oral BID    gabapentin  300 mg Oral QAM    gabapentin  600 mg Oral QPM    insulin glargine  34 Units SubCUTAneous Daily    levETIRAcetam  500 mg Oral BID    rosuvastatin  40 mg Oral Nightly    therapeutic multivitamin-minerals  1 tablet Oral Daily    cefTRIAXone (ROCEPHIN) IV  2,000 mg IntraVENous Q24H    sodium chloride flush  5-40 mL IntraVENous 2 times per day    thiamine  200 mg Oral Daily    clonazePAM  1 mg Oral Q8H      Infusions:    sodium chloride      dextrose         PRN Meds: sodium chloride flush, 5-40 mL, PRN  potassium chloride, 40 mEq, PRN   Or  potassium alternative oral replacement, 40 mEq, PRN   Or  potassium chloride, 10 mEq, PRN  magnesium sulfate, 2,000 mg, PRN  ondansetron, 4 mg, Q8H PRN   Or  ondansetron, 4 mg, Q6H PRN  melatonin, 3 mg, Nightly PRN  senna, 1 tablet, BID PRN  aluminum & magnesium hydroxide-simethicone, 30 mL, Q6H PRN  albuterol sulfate HFA, 2 puff, Q6H PRN  sodium chloride flush, 5-40 mL, PRN  sodium chloride, , PRN  acetaminophen, 650 mg, Q6H PRN   Or  acetaminophen, 650 mg, Q6H PRN  oxyCODONE, 5 mg, Q4H PRN  LORazepam, 1 mg, Q1H PRN   Or  LORazepam, 1 mg, Q1H PRN   Or  LORazepam, 2 mg, Q1H PRN   Or  LORazepam, 2 mg, Q1H PRN   Or  LORazepam, 3 mg, Q1H PRN   Or  LORazepam, 3 mg, Q1H PRN   Or  LORazepam, 4 mg, Q1H PRN   Or  LORazepam, 4 mg, Q1H PRN  glucose, 4 tablet, PRN  dextrose bolus, 125 mL, PRN   Or  dextrose bolus, 250 mL, PRN  glucagon (rDNA), 1 mg, PRN  dextrose, , Continuous PRN  HYDROmorphone, 0.25 mg, Q4H PRN  ipratropium 0.5 mg-albuterol 2.5 mg, 1 Dose, Q4H PRN        LABS:  CBC:   Recent Labs     04/05/24  1323 04/06/24  0409   WBC 14.9* 10.6   HGB 16.7* 12.8    315     BMP:    Recent Labs     04/05/24  2307 04/06/24  0409 04/06/24  0800   * 129* 126*   K 4.5 4.4 4.5   CL 91* 95* 92*   CO2 22 22 21   BUN 24* 27* 29*   CREATININE 0.8 0.8 1.0   GLUCOSE 235* 178* 168*     Hepatic:   Recent Labs     04/05/24  1323 04/05/24  1358   AST Specimen 
required DME for discharge    Assessment/Clinical Assessment:   Pt seen today for physical therapy Evaluation & Treatment.   Pt demonstrated decreased Activity tolerance, Functional Mobility , Safety, and Strengthas well as decreased independence with Ambulation, Bed Mobility , and Transfers.   Recommending SNF upon discharge as patient functioning well below baseline, demonstrates good rehab potential and unable to return home due to limited or no family support and limited safety awareness.   Pt on 2LO2 NC; able to stand w/ min A and ambulate short distance to chair w/ RW and CGA w/ No LOB w/ increased time. Pt lives alone and functioning below baseline; recommending SNF if no improvement w/in hospital stay; pt expressing no interest in SNF and wanting to go home- if case then HHPT.     Activity Tolerance:See below.   Impairments Requiring Therapeutic Intervention: decreased functional mobility, decreased strength, decreased safety awareness, decreased endurance, decreased balance, increased pain  Rehab Potential: good and fair  ___________________________________________________________________________________________________________________________________________  Precautions/Restrictions: high fall risk, contact precautions, up as tolerated, and up with assist  Weight Bearing Restrictions:no restrictions  [] Right Upper Extremity  [] Left Upper Extremity [] Right Lower Extremity  [] Left Lower Extremity     Required Braces/Orthotics: no braces required   [] Right  [] Left  Positional Restrictions:no positional restrictions     Pre-Admission Information   Lives With: alone       Type of Home: apartment  Home Layout: one level  Home Access: level entry  Bathroom Layout: walk in shower  Bathroom Equipment: grab bars in shower, shower chair  Toilet Height: standard height  Home Equipment: rolling walker  Transfer Assistance: Independent without use of device  Ambulation Assistance:modified independent with use of 
was done after IVF.  It was normal at a time when sodium improved to 133.  At that time urine sodium remained <20 and urine osm of 251.  Suspect SIADH component or reset of osmostat with chronic alcohol use and poor nutritional intake.     UDS is positive for oxy and benzo.    Lactic acidemia - improved.      Continue ceftriaxone.  F/u on sensitivity   D/c 1/2 NS and give LR bolus 500 mL  Continue thiamine      Cody Goodman MD    
sequencing, verbal/Tactile cues for hand placement, Increased time,  off balance initially w/ first STS  Ambulation on Level surfaces :   Assistance required contact guard assistance  Assistive Device rolling walker  Other Appliance supplemental O2  Distance in feet : 10ft, 20ft   Gait Mechanics: slow, narrow DONAVON  Comments:   verbal cues for sequencing, verbal/Tactile cues for hand placement, verbal cues for safety  Stair Mobility:  Stair mobility not completed on this date.  Comments:     Wheelchair Mobility:  No w/c mobility completed on this date.  Comments:       Balance  Static Sitting:   Pt requires Modified Independent to maintain   Dynamic Sitting:   Pt requires Modified Independent to maintain   Comments:     Static Standing:  Pt requires CGA to maintain w/ RW  Dynamic Standing:   Pt requires   CGA to maintain w/ RW  Comments:     Seated THEREX:  [x]10-15reps of each exercise:  [x]Hip abduction  [x]Gluteal isometrics  []Quadricep Isometrics  []Heelslides  [x]LAQ  []SLR  [x]Ankle pumps     []Toe raises  Disease Specific Education for:    []Pursed Lip Breathing  []Deep Breathing Excercises with emphasis on:  chest wall expansion, Upright posture to optimize air exchange and Diaphragmatic Breathing.   []CHF Management with education on importance of matching sx to appropropriate activity taking into consideration   []BE FAST 911 STROKE Signs (Balance: loss of balance, dizziness; Eyes: vision changes; Face; facial drooping, severe headache; Arms: weakness, numbness, Speech: trouble speaking, confusion; Time to [id number])  []Midline upright seated posture  []Neutral Seated Spine Posture  []Neutral Standing Posture     Patient instructed in and performed the following exercises with:  [x]Verbal cues for exercise angles and set up  []Cues for balance  [x]Facilitation for technique  [x]Patient verbalized understanding of or demonstrated understanding of instruction    Other Therapeutic Interventions    Functional 
patient radiation dose.] FINDINGS: Noncontrast axial images reveal midline ventricles. Ventricular size and cortical sulci are  prominent compatible with atrophic changes. No intra-axial/extra-axial masses or fluid collections. Diffuse periventricular white matter hypodensity lesions consistent with microangiopathic small vessel ischemic changes. No evidence of acute cranial hemorrhage. Posterior fossa is within normal limits Paranasal sinuses: Opacification left maxillary sinus. Partial opacification right maxillary sinus, chronic sinusitis, previously reported on MRI Orbits: None Mastoids and Temporal Bones: None Skull base and Bony calvarium remains intact, no destructive lesions.     1. Atrophic changes 2. Extensive periventricular microangiopathic ischemic changes, chronic small vessel disease 3. No evidence of acute intracranial hemorrhage 4. Chronic maxillary sinusitis with near complete opacification left maxillary antrum    XR CHEST PORTABLE    Result Date: 4/5/2024  EXAM: PORTABLE AP CHEST X-RAY, 4/5/2024 1:41 PM INDICATION: Chest Pain Reason for exam:->Chest Pain COMPARISON: None FINDINGS: Trachea is midline. Cardiomegaly Aortic tortuosity with calcification aortic arch. Minimal streaky platelike atelectasis in the right costophrenic angle. No active airspace disease No vascular redistribution No interstitial peripheral Kerley B line's No effusion.     1. Cardiomegaly.    Cultures:  Organism: No results found for: \"ORG\"    Blood Cultures: Reviewed. No growth so far.  Urine Cultures: Reviewed. > 100 k Klebsiella pneumonia    Electronically signed by: Electronically signed by Chris Mukherjee MD on 4/7/2024 at 10:16 AM, 4/7/2024 10:16 AM

## 2024-04-09 NOTE — CARE COORDINATION
DISCHARGE ORDER  Date/Time 2024 12:18 PM  Completed by: BÁRBARA Donnelly, Case Management    Patient Name: Viji Gamez      : 1950  Admitting Diagnosis: Transient alteration of awareness [R40.4]  Septicemia (HCC) [A41.9]  Electrolyte disturbance [E87.8]  Elevated troponin [R79.89]  Atrial fibrillation with rapid ventricular response (HCC) [I48.91]  Elevated brain natriuretic peptide (BNP) level [R79.89]  Sepsis (HCC) [A41.9]      Admit order Date and Status:2024  (verify MD's last order for status of admission)      Noted discharge order.   Confirmed discharge plan: Yes  with whom pt.  If pt confirmed DC plan does family need to be contacted by CM No   Discharge Plan:  Plan is home in care of self. She to provide transport home today @1830. Referral to [facility] care and spoke with Mandie COOPER Pt being d/c today.  Called [facility] pt O2 supplier and informed them pt in need of O2 tank to get home.CM to follow pt d/c needs.     Reviewed chart.  Role of discharge planner explained and patient verbalized understanding. Discharge order is noted.    Has Home O2 in place on admit:  Yes  Informed of need to bring portable home O2 tank on day of discharge for nursing to connect prior to leaving:   Yes  Verbalized agreement/Understanding:   Yes  Pt is being d/c'd to Plan is home in care of self. She to provide transport home today @1830. Referral to [facility] care and spoke with Mandie COOPER Pt being d/c today.  Called [employer] pt O2 supplier and informed them pt in need of O2 tank to get home. today. Pt's O2 sats are 100% on RA.    Discharge timeout done with cm pt rn. All discharge needs and concerns addressed.   
DISCHARGE ORDER  Date/Time 2024 3:27 PM  Completed by: BÁRBARA Donnelly, Case Management    Patient Name: Viji Gamez      : 1950  Admitting Diagnosis: Transient alteration of awareness [R40.4]  Septicemia (HCC) [A41.9]  Electrolyte disturbance [E87.8]  Elevated troponin [R79.89]  Atrial fibrillation with rapid ventricular response (HCC) [I48.91]  Elevated brain natriuretic peptide (BNP) level [R79.89]  Sepsis (HCC) [A41.9]      Admit order Date and Status:2024  (verify MD's last order for status of admission)      Noted discharge order.   If applicable PT/OT recommendation at Discharge: Yes  DME recommendation by PT/OT:Yes  Confirmed discharge plan: Yes  with whom pt and She(friend)  If pt confirmed DC plan does family need to be contacted by CM No   Discharge Plan: Plan is home in care of self. She to provide transport home today @1700. Referral to [facility] home care and spoke with Mandie COOPER Pt being d/c today. [facility] pt [facility]2 supplier and informed them pt in need of O2 tank to get home.  orders [place]ed [facility]       Reviewed chart.  Role of discharge planner explained and patient verbalized understanding. Discharge order is noted.    Has Home O2 in place on admit:  Yes  Informed of need to bring portable home O2 tank on day of discharge for nursing to connect prior to leaving:   Not Indicated  Verbalized agreement/Understanding:   Yes  Pt is being d/c'd to Plan is home in care of self. She to provide transport home today @1700. Referral to [facility] care and spoke with Mandie COOPER Pt being d/c today. Called [employer] pt O2 supplier and informed them pt in need of O2 tank to get home.  orders [place] [facility]  [place]. Pt's O2 sats are 100% on 3L.    Discharge timeout done with cm,rn,pt. All discharge needs and concerns addressed.   
Yes  Other Identified Issues/Barriers to RETURNING to current housing: Yes  Potential Assistance needed at discharge: Home Care (Pt chose [facility])            Potential DME:    Patient expects to discharge to: Apartment  Plan for transportation at discharge:      Financial    [facility] Plan[facility] [place] / Product Type: *No Product type* /     Does insurance require precert for SNF: Yes    Potential assistance Purchasing Medications: No  Meds-to-Beds request:        [employer] [place], [place] - [address] [phone] - F [phone]  [address] [address]  Phone: [phone] Fax: [phone]      Notes:    Factors facilitating achievement of predicted outcomes: Cooperative and Pleasant    Barriers to discharge: Decreased endurance    Additional Case Management Notes: Reviewed Chart. Met with pt at bedside. Explained CM role. Spoke She pt friend/caregiver per phone. Plan is home in care of self. She to provide transport home today @1830. Referral to [facility] for home care and spoke with Mandie COOPER Pt being d/c today.  Called [facility] pt O2 supplier and informed them pt in need of O2 tank to get home.CM to follow pt d/c needs.    The Plan for Transition of Care is related to the following treatment goals of Transient alteration of awareness [R40.4]  Septicemia (HCC) [A41.9]  Electrolyte disturbance [E87.8]  Elevated troponin [R79.89]  Atrial fibrillation with rapid ventricular response (HCC) [I48.91]  Elevated brain natriuretic peptide (BNP) level [R79.89]  Sepsis (HCC) [A41.9]    IF APPLICABLE: The Patient and/or patient representative Viji and her family were provided with a choice of provider and agrees with the discharge plan. Freedom of choice list with basic dialogue that supports the patient's individualized plan of care/goals and shares the quality data associated with the providers was provided to:     Patient Representative Name:       The

## 2024-04-09 NOTE — FLOWSHEET NOTE
04/09/24 1611   Vital Signs   Temp 97.5 °F (36.4 °C)   Temp Source Oral   Pulse 71   Heart Rate Source Monitor   Respirations 18   BP (!) 137/99   MAP (Calculated) 112   BP Location Left upper arm   BP Method Automatic   Patient Position Semi fowlers   Oxygen Therapy   SpO2 100 %     Patient assessment completed.  No s/s of distress. No additional needs at this time.

## 2024-04-09 NOTE — FLOWSHEET NOTE
04/09/24 0745   Vital Signs   Temp 97.3 °F (36.3 °C)   Temp Source Oral   Pulse 78   Heart Rate Source Monitor   Respirations 16   BP (!) 166/70   MAP (Calculated) 102   BP Location Left upper arm   BP Method Automatic   Patient Position Semi fowlers   Pain Assessment   Pain Assessment 0-10   Pain Level 2   Patient's Stated Pain Goal 0 - No pain   Pain Location Throat   Pain Descriptors Sore   Functional Pain Assessment Activities are not prevented   Pain Type Acute pain   Pain Frequency Continuous   Pain Onset Awakened from sleep   Multiple Pain Sites No   Oxygen Therapy   SpO2 100 %   O2 Device Nasal cannula   O2 Flow Rate (L/min) 3 L/min     Patient assessment completed.  Patient going for stress test, meds will be given when patient returns.

## 2024-04-09 NOTE — DISCHARGE SUMMARY
Discharge Summary    Name:  Viji Gamez /Age/Sex: 1950 (73 y.o. female)   Admit Date: 2024  Discharge Date: 24    MRN & CSN:  [id number] & [id number] Encounter Date and Time 24    Attending:  Chris Mukherjee MD Discharging Provider: Chris Mukherjee MD     Hospital Course:   REASON FOR ADMISSION/CHIEF COMPLAINT:   Sepsis (HCC)    PRINCIPAL DIAGNOSIS* AND HOSPITAL PROBLEM LIST:  Transient alteration of awareness [R40.4]  Septicemia (HCC) [A41.9]  Electrolyte disturbance [E87.8]  Elevated troponin [R79.89]  Atrial fibrillation with rapid ventricular response (HCC) [I48.91]  Elevated brain natriuretic peptide (BNP) level [R79.89]  Sepsis (HCC) [A41.9]    CONSULTS DURING THE ADMISSION:  IP CONSULT TO CARDIOLOGY  IP CONSULT TO CRITICAL CARE  IP CONSULT TO HOSPITALIST  IP CONSULT TO SOCIAL WORK  IP CONSULT TO SPIRITUAL SERVICES  IP CONSULT TO NEPHROLOGY    BRIEF HPI & SUMMARY OF HOSPITAL COURSE:   Viji Gamez is a 73 y.o. female with medical history including DM2, HTN, chronic atrial fibrillation, COPD, s/p Watchman device, chronic oxygen therapy, chronic diastolic CHF, anxiety and prior ischemic CVA presented to emergency room from home by family with complaint of patient seem to be confused, not feeling well, some nausea and abdominal pain. Admitted for further evaluation and management. Please review H&P for full details. In summary, during the course of hospitalization patient was found to have following problems including  Hypo osmolar hyponatremia likely hypovolemic d/t poor po intake Na 126--> 134  Altered mental status likely acute metabolic encephalopathy d/t UTI, POA: resolved.  Abnormal UA likely acute UTI: Urine cx with > 100 k Klebsiella pneumonia, noted sensitivity. Cont Cipro 500 mg po bid for 4 more days  Atrial fibrillation with rapid ventricular rate,POA: History of chronic A-fib, s/p Watchman device, not on chronic anticoagulation. Hx of noncompliance with medications. Afib 
medications  hydrOXYzine HCl 25 MG tablet          Objective Findings at Discharge:   BP (!) 132/99   Pulse 74   Temp 97.5 °F (36.4 °C) (Oral)   Resp 18   Ht 1.524 m (5')   Wt 57.2 kg (126 lb)   SpO2 100%   BMI 24.61 kg/m²            PHYSICAL EXAM   Please see today's progress note.    BMP/CBC  Recent Labs     04/07/24  0146 04/07/24  0753 04/08/24  0200 04/08/24  0741 04/08/24  1349   *   < > 134* 134* 133*   K 4.2   < > 4.3 4.2 4.1   CL 93*   < > 102 101 100   CO2 24   < > 23 25 24   BUN 33*   < > 26* 23* 19   CREATININE 0.9   < > 0.6 0.6 0.6   WBC 8.4  --  10.3  --   --    HCT 33.8*  --  38.4  --   --      --  223  --   --     < > = values in this interval not displayed.       Discharge Time of 35 minutes    Electronically signed by Ender Paez MD on 4/9/2024 at 3:03 PM    
Declines

## 2024-04-09 NOTE — PLAN OF CARE
Problem: Chronic Conditions and Co-morbidities  Goal: Patient's chronic conditions and co-morbidity symptoms are monitored and maintained or improved  4/8/2024 2209 by Livier Ann RN  Outcome: Progressing     Problem: Safety - Adult  Goal: Free from fall injury  Outcome: Progressing     Problem: Neurosensory - Adult  Goal: Absence of seizures  Outcome: Progressing     Problem: Neurosensory - Adult  Goal: Remains free of injury related to seizures activity  Outcome: Progressing     Problem: Respiratory - Adult  Goal: Achieves optimal ventilation and oxygenation  Outcome: Progressing     Problem: Genitourinary - Adult  Goal: Absence of urinary retention  Outcome: Progressing

## 2024-04-09 NOTE — PLAN OF CARE
Problem: Safety - Adult  Goal: Free from fall injury  4/9/2024 1625 by Chasity Quick RN  Outcome: Adequate for Discharge  4/9/2024 1333 by Neli Kaminski RN  Outcome: Progressing     Problem: Chronic Conditions and Co-morbidities  Goal: Patient's chronic conditions and co-morbidity symptoms are monitored and maintained or improved  4/9/2024 1625 by Chasity Quick RN  Outcome: Adequate for Discharge  4/9/2024 1333 by Neli Kaminski RN  Outcome: Progressing     Problem: Neurosensory - Adult  Goal: Absence of seizures  4/9/2024 1625 by Chasity Quick RN  Outcome: Adequate for Discharge  4/9/2024 1333 by Neli Kaminski RN  Outcome: Progressing  Goal: Remains free of injury related to seizures activity  4/9/2024 1625 by Chasity Quick RN  Outcome: Adequate for Discharge  4/9/2024 1333 by Neli Kaminski RN  Outcome: Progressing     Problem: Respiratory - Adult  Goal: Achieves optimal ventilation and oxygenation  4/9/2024 1625 by Chasity Quick RN  Outcome: Adequate for Discharge  4/9/2024 1333 by Neli Kaminski RN  Outcome: Progressing     Problem: Genitourinary - Adult  Goal: Absence of urinary retention  4/9/2024 1625 by Chasity Quick RN  Outcome: Adequate for Discharge  4/9/2024 1333 by Neli Kaminski RN  Outcome: Progressing     Problem: Infection - Adult  Goal: Absence of infection at discharge  4/9/2024 1625 by Chasity Quick RN  Outcome: Adequate for Discharge  4/9/2024 1333 by Neli Kaminski RN  Outcome: Progressing

## 2024-04-09 NOTE — RT PROTOCOL NOTE
RT Nebulizer Bronchodilator Protocol Note    There is a bronchodilator order in the chart from a provider indicating to follow the RT Bronchodilator Protocol and there is an “Initiate RT Bronchodilator Protocol” order as well (see protocol at bottom of note).    CXR Findings:  No results found.    The findings from the last RT Protocol Assessment were as follows:  Smoking: Chronic pulmonary disease  Respiratory Pattern: Regular pattern and RR 12-20 bpm  Breath Sounds: Slightly diminished and/or crackles  Cough: Strong, spontaneous, non-productive  Indication for Bronchodilator Therapy: On home bronchodilators  Bronchodilator Assessment Score: 4    Aerosolized bronchodilator medication orders have been revised according to the RT Nebulizer Bronchodilator Protocol below.    Respiratory Therapist to perform RT Therapy Protocol Assessment initially then follow the protocol.  Repeat RT Therapy Protocol Assessment PRN for score 0-3 or on second treatment, BID, and PRN for scores above 3.    No Indications - adjust the frequency to every 6 hours PRN wheezing or bronchospasm, if no treatments needed after 48 hours then discontinue using Per Protocol order mode.     If indication present, adjust the RT bronchodilator orders based on the Bronchodilator Assessment Score as indicated below.  If a patient is on this medication at home then do not decrease Frequency below that used at home.    0-3 - enter or revise RT bronchodilator order(s) to equivalent RT Bronchodilator order with Frequency of every 4 hours PRN for wheezing or increased work of breathing using Per Protocol order mode.       4-6 - enter or revise RT Bronchodilator order(s) to two equivalent RT bronchodilator orders with one order with BID Frequency and one order with Frequency of every 4 hours PRN wheezing or increased work of breathing using Per Protocol order mode.         7-10 - enter or revise RT Bronchodilator order(s) to two equivalent RT bronchodilator

## 2024-04-09 NOTE — FLOWSHEET NOTE
04/09/24 1157   Vital Signs   Temp 97.5 °F (36.4 °C)   Temp Source Oral   Pulse 74   Heart Rate Source Monitor   Respirations 18   BP (!) 132/99   MAP (Calculated) 110   BP Location Left upper arm   BP Method Automatic   Patient Position Up in chair   Oxygen Therapy   SpO2 100 %     Patient assessment completed.  Meds given per MAR.  Patient sitting in chair.  BS 68, will perfect serve MD for diet.

## 2024-04-09 NOTE — DISCHARGE INSTR - DIET

## 2024-04-10 LAB
MICROORGANISM SPEC CULT: NORMAL
MICROORGANISM SPEC CULT: NORMAL
SERVICE CMNT-IMP: NORMAL
SERVICE CMNT-IMP: NORMAL
SPECIMEN DESCRIPTION: NORMAL
SPECIMEN DESCRIPTION: NORMAL

## 2024-06-27 ENCOUNTER — APPOINTMENT (OUTPATIENT)
Age: 74
DRG: 463 | End: 2024-06-27
Payer: COMMERCIAL

## 2024-06-27 ENCOUNTER — HOSPITAL ENCOUNTER (INPATIENT)
Age: 74
LOS: 4 days | Discharge: HOME HEALTH | DRG: 463 | End: 2024-07-01
Attending: EMERGENCY MEDICINE | Admitting: INTERNAL MEDICINE
Payer: COMMERCIAL

## 2024-06-27 DIAGNOSIS — I63.9 CEREBROVASCULAR ACCIDENT (CVA), UNSPECIFIED MECHANISM (HCC): ICD-10-CM

## 2024-06-27 DIAGNOSIS — G93.41 ACUTE METABOLIC ENCEPHALOPATHY: Primary | ICD-10-CM

## 2024-06-27 DIAGNOSIS — I48.91 ATRIAL FIBRILLATION WITH RAPID VENTRICULAR RESPONSE (HCC): ICD-10-CM

## 2024-06-27 PROBLEM — G93.40 ACUTE ENCEPHALOPATHY: Status: ACTIVE | Noted: 2024-06-27

## 2024-06-27 LAB
ALBUMIN SERPL-MCNC: 4.2 G/DL (ref 3.4–5)
ALBUMIN/GLOB SERPL: 1.4 {RATIO}
ALP SERPL-CCNC: 114 U/L (ref 40–129)
ALT SERPL-CCNC: 14 U/L (ref 10–40)
AMPHET UR QL SCN: NEGATIVE
ANION GAP SERPL CALCULATED.3IONS-SCNC: 17 MMOL/L (ref 3–16)
AST SERPL-CCNC: 27 U/L (ref 15–37)
BARBITURATES UR QL SCN: NEGATIVE
BASOPHILS # BLD: 0.05 K/UL (ref 0–0.2)
BASOPHILS NFR BLD: 1 %
BENZODIAZ UR QL: NEGATIVE
BILIRUB SERPL-MCNC: <0.2 MG/DL (ref 0–1)
BUN SERPL-MCNC: 9 MG/DL (ref 7–20)
CALCIUM SERPL-MCNC: 9.1 MG/DL (ref 8.3–10.6)
CANNABINOIDS UR QL SCN: NEGATIVE
CHLORIDE SERPL-SCNC: 103 MMOL/L (ref 99–110)
CO2 SERPL-SCNC: 20 MMOL/L (ref 21–32)
COCAINE UR QL SCN: NEGATIVE
CREAT SERPL-MCNC: 0.7 MG/DL (ref 0.6–1.2)
EOSINOPHIL # BLD: 0.45 K/UL (ref 0–0.6)
EOSINOPHILS RELATIVE PERCENT: 6 %
ERYTHROCYTE [DISTWIDTH] IN BLOOD BY AUTOMATED COUNT: 13.7 % (ref 12.4–15.4)
ETHANOLAMINE SERPL-MCNC: NORMAL MG/DL (ref 0–0.08)
FENTANYL UR QL: NEGATIVE
GFR, ESTIMATED: >90 ML/MIN/1.73M2
GLUCOSE SERPL-MCNC: 149 MG/DL (ref 70–99)
HCT VFR BLD AUTO: 37.4 % (ref 36–48)
HGB BLD-MCNC: 12.3 G/DL (ref 12–16)
IMM GRANULOCYTES # BLD AUTO: 0.05 K/UL (ref 0–0.5)
IMM GRANULOCYTES NFR BLD: 1 %
LACTATE BLDV-SCNC: 1.6 MMOL/L (ref 0.4–1.9)
LACTATE BLDV-SCNC: 2 MMOL/L (ref 0.4–1.9)
LYMPHOCYTES NFR BLD: 1.02 K/UL (ref 1–5.1)
LYMPHOCYTES RELATIVE PERCENT: 14 %
MCH RBC QN AUTO: 28.3 PG (ref 26–34)
MCHC RBC AUTO-ENTMCNC: 32.9 G/DL (ref 31–36)
MCV RBC AUTO: 86.2 FL (ref 80–100)
METHADONE UR QL: NEGATIVE
MONOCYTES NFR BLD: 0.83 K/UL (ref 0–1.3)
MONOCYTES NFR BLD: 11 %
NEUTROPHILS NFR BLD: 68 %
NEUTS SEG NFR BLD: 5.09 K/UL (ref 1.7–7.7)
OPIATES UR QL SCN: NEGATIVE
OXYCODONE UR QL SCN: POSITIVE
PCP UR QL SCN: NEGATIVE
PH UR STRIP: 5 [PH]
PLATELET # BLD AUTO: 389 K/UL (ref 135–450)
PMV BLD AUTO: 10.6 FL
POTASSIUM SERPL-SCNC: 4 MMOL/L (ref 3.5–5.1)
PROCALCITONIN SERPL-MCNC: 0.06 NG/ML (ref 0–0.15)
PROT SERPL-MCNC: 7.3 G/DL (ref 6.4–8.2)
RBC # BLD AUTO: 4.34 M/UL (ref 4–5.2)
SARS-COV-2 RDRP RESP QL NAA+PROBE: NOT DETECTED
SODIUM SERPL-SCNC: 140 MMOL/L (ref 136–145)
SPECIMEN DESCRIPTION: NORMAL
TEST INFORMATION: ABNORMAL
TROPONIN I SERPL HS-MCNC: 12 NG/L (ref 0–14)
TROPONIN I SERPL HS-MCNC: 12 NG/L (ref 0–14)
WBC OTHER # BLD: 7.5 K/UL (ref 4–11)

## 2024-06-27 PROCEDURE — 87635 SARS-COV-2 COVID-19 AMP PRB: CPT

## 2024-06-27 PROCEDURE — 70450 CT HEAD/BRAIN W/O DYE: CPT

## 2024-06-27 PROCEDURE — 84145 PROCALCITONIN (PCT): CPT

## 2024-06-27 PROCEDURE — 2060000000 HC ICU INTERMEDIATE R&B

## 2024-06-27 PROCEDURE — 84484 ASSAY OF TROPONIN QUANT: CPT

## 2024-06-27 PROCEDURE — 71045 X-RAY EXAM CHEST 1 VIEW: CPT

## 2024-06-27 PROCEDURE — 2500000003 HC RX 250 WO HCPCS: Performed by: EMERGENCY MEDICINE

## 2024-06-27 PROCEDURE — 99285 EMERGENCY DEPT VISIT HI MDM: CPT

## 2024-06-27 PROCEDURE — 87040 BLOOD CULTURE FOR BACTERIA: CPT

## 2024-06-27 PROCEDURE — 93005 ELECTROCARDIOGRAM TRACING: CPT | Performed by: EMERGENCY MEDICINE

## 2024-06-27 PROCEDURE — 80053 COMPREHEN METABOLIC PANEL: CPT

## 2024-06-27 PROCEDURE — 96360 HYDRATION IV INFUSION INIT: CPT

## 2024-06-27 PROCEDURE — 80307 DRUG TEST PRSMV CHEM ANLYZR: CPT

## 2024-06-27 PROCEDURE — 96361 HYDRATE IV INFUSION ADD-ON: CPT

## 2024-06-27 PROCEDURE — G0480 DRUG TEST DEF 1-7 CLASSES: HCPCS

## 2024-06-27 PROCEDURE — 2580000003 HC RX 258: Performed by: EMERGENCY MEDICINE

## 2024-06-27 PROCEDURE — 85025 COMPLETE CBC W/AUTO DIFF WBC: CPT

## 2024-06-27 PROCEDURE — 96374 THER/PROPH/DIAG INJ IV PUSH: CPT

## 2024-06-27 PROCEDURE — 83605 ASSAY OF LACTIC ACID: CPT

## 2024-06-27 RX ORDER — DILTIAZEM HYDROCHLORIDE 5 MG/ML
10 INJECTION INTRAVENOUS ONCE
Status: COMPLETED | OUTPATIENT
Start: 2024-06-27 | End: 2024-06-27

## 2024-06-27 RX ORDER — 0.9 % SODIUM CHLORIDE 0.9 %
500 INTRAVENOUS SOLUTION INTRAVENOUS ONCE
Status: COMPLETED | OUTPATIENT
Start: 2024-06-27 | End: 2024-06-27

## 2024-06-27 RX ADMIN — SODIUM CHLORIDE 500 ML: 9 INJECTION, SOLUTION INTRAVENOUS at 20:31

## 2024-06-27 RX ADMIN — SODIUM CHLORIDE 500 ML: 9 INJECTION, SOLUTION INTRAVENOUS at 21:33

## 2024-06-27 RX ADMIN — DILTIAZEM HYDROCHLORIDE 10 MG: 5 INJECTION, SOLUTION INTRAVENOUS at 21:43

## 2024-06-27 ASSESSMENT — PAIN DESCRIPTION - LOCATION: LOCATION: ABDOMEN

## 2024-06-27 ASSESSMENT — PAIN SCALES - GENERAL
PAINLEVEL_OUTOF10: 0
PAINLEVEL_OUTOF10: 10

## 2024-06-27 ASSESSMENT — PAIN - FUNCTIONAL ASSESSMENT: PAIN_FUNCTIONAL_ASSESSMENT: 0-10

## 2024-06-27 ASSESSMENT — LIFESTYLE VARIABLES
HOW MANY STANDARD DRINKS CONTAINING ALCOHOL DO YOU HAVE ON A TYPICAL DAY: PATIENT DECLINED
HOW OFTEN DO YOU HAVE A DRINK CONTAINING ALCOHOL: PATIENT DECLINED

## 2024-06-28 ENCOUNTER — APPOINTMENT (OUTPATIENT)
Age: 74
DRG: 463 | End: 2024-06-28
Payer: COMMERCIAL

## 2024-06-28 LAB
ALBUMIN SERPL-MCNC: 4.2 G/DL (ref 3.4–5)
ALBUMIN/GLOB SERPL: 1.3 {RATIO}
ALP SERPL-CCNC: 120 U/L (ref 40–129)
ALT SERPL-CCNC: 10 U/L (ref 10–40)
AMORPH SED URNS QL MICRO: PRESENT
ANION GAP SERPL CALCULATED.3IONS-SCNC: 14 MMOL/L (ref 3–16)
AST SERPL-CCNC: 25 U/L (ref 15–37)
BACTERIA URNS QL MICRO: ABNORMAL
BASOPHILS # BLD: 0.07 K/UL (ref 0–0.2)
BASOPHILS NFR BLD: 1 %
BILIRUB DIRECT SERPL-MCNC: <0.2 MG/DL (ref 0–0.3)
BILIRUB INDIRECT SERPL-MCNC: NORMAL MG/DL (ref 0–1)
BILIRUB SERPL-MCNC: <0.2 MG/DL (ref 0–1)
BILIRUB UR QL STRIP: ABNORMAL
BUN SERPL-MCNC: 7 MG/DL (ref 7–20)
CALCIUM SERPL-MCNC: 8.9 MG/DL (ref 8.3–10.6)
CHARACTER UR: ABNORMAL
CHLORIDE SERPL-SCNC: 104 MMOL/L (ref 99–110)
CLARITY UR: ABNORMAL
CO2 SERPL-SCNC: 23 MMOL/L (ref 21–32)
COLOR UR: ABNORMAL
CREAT SERPL-MCNC: 0.6 MG/DL (ref 0.6–1.2)
EKG DIAGNOSIS: NORMAL
EKG DIAGNOSIS: NORMAL
EKG Q-T INTERVAL: 312 MS
EKG Q-T INTERVAL: 320 MS
EKG QRS DURATION: 74 MS
EKG QRS DURATION: 76 MS
EKG QTC CALCULATION (BAZETT): 450 MS
EKG QTC CALCULATION (BAZETT): 477 MS
EKG R AXIS: 40 DEGREES
EKG R AXIS: 59 DEGREES
EKG T AXIS: 28 DEGREES
EKG T AXIS: 54 DEGREES
EKG VENTRICULAR RATE: 119 BPM
EKG VENTRICULAR RATE: 141 BPM
EOSINOPHIL # BLD: 0.45 K/UL (ref 0–0.6)
EOSINOPHILS RELATIVE PERCENT: 6 %
EPI CELLS #/AREA URNS HPF: ABNORMAL /HPF
ERYTHROCYTE [DISTWIDTH] IN BLOOD BY AUTOMATED COUNT: 13.9 % (ref 12.4–15.4)
GFR, ESTIMATED: >90 ML/MIN/1.73M2
GLUCOSE BLD-MCNC: 138 MG/DL (ref 70–99)
GLUCOSE BLD-MCNC: 143 MG/DL (ref 70–99)
GLUCOSE BLD-MCNC: 162 MG/DL (ref 70–99)
GLUCOSE BLD-MCNC: 163 MG/DL (ref 70–99)
GLUCOSE BLD-MCNC: 193 MG/DL (ref 70–99)
GLUCOSE SERPL-MCNC: 166 MG/DL (ref 70–99)
GLUCOSE UR STRIP-MCNC: NEGATIVE MG/DL
HCT VFR BLD AUTO: 37.8 % (ref 36–48)
HGB BLD-MCNC: 12.6 G/DL (ref 12–16)
HGB UR QL STRIP.AUTO: ABNORMAL
IMM GRANULOCYTES # BLD AUTO: 0.04 K/UL (ref 0–0.5)
IMM GRANULOCYTES NFR BLD: 1 %
KETONES UR STRIP-MCNC: NEGATIVE MG/DL
LEUKOCYTE ESTERASE UR QL STRIP: ABNORMAL
LYMPHOCYTES NFR BLD: 0.91 K/UL (ref 1–5.1)
LYMPHOCYTES RELATIVE PERCENT: 12 %
MCH RBC QN AUTO: 29 PG (ref 26–34)
MCHC RBC AUTO-ENTMCNC: 33.3 G/DL (ref 31–36)
MCV RBC AUTO: 86.9 FL (ref 80–100)
MONOCYTES NFR BLD: 0.74 K/UL (ref 0–1.3)
MONOCYTES NFR BLD: 10 %
NEUTROPHILS NFR BLD: 72 %
NEUTS SEG NFR BLD: 5.54 K/UL (ref 1.7–7.7)
NITRITE UR QL STRIP: POSITIVE
PH UR STRIP: 7.5 [PH] (ref 5–8)
PLATELET # BLD AUTO: 422 K/UL (ref 135–450)
PMV BLD AUTO: 9.7 FL
POTASSIUM SERPL-SCNC: 3.9 MMOL/L (ref 3.5–5.1)
PROT SERPL-MCNC: 7.3 G/DL (ref 6.4–8.2)
PROT UR STRIP-MCNC: NEGATIVE MG/DL
RBC # BLD AUTO: 4.35 M/UL (ref 4–5.2)
RBC #/AREA URNS HPF: ABNORMAL /HPF
SODIUM SERPL-SCNC: 141 MMOL/L (ref 136–145)
SP GR UR STRIP: 1.01 (ref 1–1.03)
UROBILINOGEN UR STRIP-ACNC: 0.2 EU/DL (ref 0–1)
WBC #/AREA URNS HPF: ABNORMAL /HPF
WBC OTHER # BLD: 7.8 K/UL (ref 4–11)

## 2024-06-28 PROCEDURE — 36415 COLL VENOUS BLD VENIPUNCTURE: CPT

## 2024-06-28 PROCEDURE — 6360000002 HC RX W HCPCS: Performed by: INTERNAL MEDICINE

## 2024-06-28 PROCEDURE — 85025 COMPLETE CBC W/AUTO DIFF WBC: CPT

## 2024-06-28 PROCEDURE — 2700000000 HC OXYGEN THERAPY PER DAY

## 2024-06-28 PROCEDURE — 2060000000 HC ICU INTERMEDIATE R&B

## 2024-06-28 PROCEDURE — 93005 ELECTROCARDIOGRAM TRACING: CPT | Performed by: NURSE PRACTITIONER

## 2024-06-28 PROCEDURE — 87086 URINE CULTURE/COLONY COUNT: CPT

## 2024-06-28 PROCEDURE — 6370000000 HC RX 637 (ALT 250 FOR IP): Performed by: INTERNAL MEDICINE

## 2024-06-28 PROCEDURE — 6360000002 HC RX W HCPCS: Performed by: NURSE PRACTITIONER

## 2024-06-28 PROCEDURE — 94640 AIRWAY INHALATION TREATMENT: CPT

## 2024-06-28 PROCEDURE — 93005 ELECTROCARDIOGRAM TRACING: CPT | Performed by: INTERNAL MEDICINE

## 2024-06-28 PROCEDURE — 94761 N-INVAS EAR/PLS OXIMETRY MLT: CPT

## 2024-06-28 PROCEDURE — 70551 MRI BRAIN STEM W/O DYE: CPT

## 2024-06-28 PROCEDURE — 2580000003 HC RX 258: Performed by: INTERNAL MEDICINE

## 2024-06-28 PROCEDURE — 82962 GLUCOSE BLOOD TEST: CPT

## 2024-06-28 PROCEDURE — 82248 BILIRUBIN DIRECT: CPT

## 2024-06-28 PROCEDURE — 81001 URINALYSIS AUTO W/SCOPE: CPT

## 2024-06-28 PROCEDURE — 80053 COMPREHEN METABOLIC PANEL: CPT

## 2024-06-28 PROCEDURE — 2500000003 HC RX 250 WO HCPCS: Performed by: NURSE PRACTITIONER

## 2024-06-28 PROCEDURE — 83036 HEMOGLOBIN GLYCOSYLATED A1C: CPT

## 2024-06-28 PROCEDURE — 87077 CULTURE AEROBIC IDENTIFY: CPT

## 2024-06-28 PROCEDURE — 2580000003 HC RX 258: Performed by: NURSE PRACTITIONER

## 2024-06-28 RX ORDER — HALOPERIDOL 5 MG/ML
2 INJECTION INTRAMUSCULAR ONCE
Status: COMPLETED | OUTPATIENT
Start: 2024-06-28 | End: 2024-06-28

## 2024-06-28 RX ORDER — BUTALBITAL, ACETAMINOPHEN AND CAFFEINE 50; 325; 40 MG/1; MG/1; MG/1
1 TABLET ORAL EVERY 4 HOURS PRN
Status: DISCONTINUED | OUTPATIENT
Start: 2024-06-28 | End: 2024-07-01 | Stop reason: HOSPADM

## 2024-06-28 RX ORDER — INSULIN GLARGINE 100 [IU]/ML
15 INJECTION, SOLUTION SUBCUTANEOUS NIGHTLY
Status: DISCONTINUED | OUTPATIENT
Start: 2024-06-28 | End: 2024-07-01 | Stop reason: HOSPADM

## 2024-06-28 RX ORDER — FERROUS SULFATE 325(65) MG
325 TABLET ORAL
Status: DISCONTINUED | OUTPATIENT
Start: 2024-06-28 | End: 2024-07-01 | Stop reason: HOSPADM

## 2024-06-28 RX ORDER — ONDANSETRON 2 MG/ML
4 INJECTION INTRAMUSCULAR; INTRAVENOUS EVERY 6 HOURS PRN
Status: DISCONTINUED | OUTPATIENT
Start: 2024-06-28 | End: 2024-07-01 | Stop reason: HOSPADM

## 2024-06-28 RX ORDER — POTASSIUM CHLORIDE 20 MEQ/1
40 TABLET, EXTENDED RELEASE ORAL PRN
Status: DISCONTINUED | OUTPATIENT
Start: 2024-06-28 | End: 2024-07-01 | Stop reason: HOSPADM

## 2024-06-28 RX ORDER — ENOXAPARIN SODIUM 100 MG/ML
40 INJECTION SUBCUTANEOUS DAILY
Status: DISCONTINUED | OUTPATIENT
Start: 2024-06-28 | End: 2024-07-01 | Stop reason: HOSPADM

## 2024-06-28 RX ORDER — ACETAMINOPHEN 325 MG/1
650 TABLET ORAL EVERY 6 HOURS PRN
Status: DISCONTINUED | OUTPATIENT
Start: 2024-06-28 | End: 2024-07-01 | Stop reason: HOSPADM

## 2024-06-28 RX ORDER — DULOXETIN HYDROCHLORIDE 30 MG/1
30 CAPSULE, DELAYED RELEASE ORAL 2 TIMES DAILY
Status: DISCONTINUED | OUTPATIENT
Start: 2024-06-28 | End: 2024-06-30

## 2024-06-28 RX ORDER — ACETAMINOPHEN 650 MG/1
650 SUPPOSITORY RECTAL EVERY 6 HOURS PRN
Status: DISCONTINUED | OUTPATIENT
Start: 2024-06-28 | End: 2024-07-01 | Stop reason: HOSPADM

## 2024-06-28 RX ORDER — BUPROPION HYDROCHLORIDE 150 MG/1
150 TABLET, EXTENDED RELEASE ORAL 2 TIMES DAILY
Status: DISCONTINUED | OUTPATIENT
Start: 2024-06-28 | End: 2024-07-01 | Stop reason: HOSPADM

## 2024-06-28 RX ORDER — PANTOPRAZOLE SODIUM 40 MG/1
40 TABLET, DELAYED RELEASE ORAL DAILY
Status: DISCONTINUED | OUTPATIENT
Start: 2024-06-28 | End: 2024-07-01 | Stop reason: HOSPADM

## 2024-06-28 RX ORDER — INSULIN LISPRO 100 [IU]/ML
0-4 INJECTION, SOLUTION INTRAVENOUS; SUBCUTANEOUS NIGHTLY
Status: DISCONTINUED | OUTPATIENT
Start: 2024-06-28 | End: 2024-07-01 | Stop reason: HOSPADM

## 2024-06-28 RX ORDER — ROPINIROLE 0.25 MG/1
0.25 TABLET, FILM COATED ORAL 2 TIMES DAILY
Status: DISCONTINUED | OUTPATIENT
Start: 2024-06-28 | End: 2024-07-01 | Stop reason: HOSPADM

## 2024-06-28 RX ORDER — METOPROLOL TARTRATE 1 MG/ML
2.5 INJECTION, SOLUTION INTRAVENOUS ONCE
Status: COMPLETED | OUTPATIENT
Start: 2024-06-28 | End: 2024-06-28

## 2024-06-28 RX ORDER — HYDRALAZINE HYDROCHLORIDE 20 MG/ML
10 INJECTION INTRAMUSCULAR; INTRAVENOUS EVERY 6 HOURS PRN
Status: DISCONTINUED | OUTPATIENT
Start: 2024-06-28 | End: 2024-07-01 | Stop reason: HOSPADM

## 2024-06-28 RX ORDER — INSULIN LISPRO 100 [IU]/ML
0-8 INJECTION, SOLUTION INTRAVENOUS; SUBCUTANEOUS
Status: DISCONTINUED | OUTPATIENT
Start: 2024-06-28 | End: 2024-07-01 | Stop reason: HOSPADM

## 2024-06-28 RX ORDER — SODIUM CHLORIDE 0.9 % (FLUSH) 0.9 %
5-40 SYRINGE (ML) INJECTION PRN
Status: DISCONTINUED | OUTPATIENT
Start: 2024-06-28 | End: 2024-07-01 | Stop reason: HOSPADM

## 2024-06-28 RX ORDER — MAGNESIUM SULFATE IN WATER 40 MG/ML
2000 INJECTION, SOLUTION INTRAVENOUS PRN
Status: DISCONTINUED | OUTPATIENT
Start: 2024-06-28 | End: 2024-07-01 | Stop reason: HOSPADM

## 2024-06-28 RX ORDER — SODIUM CHLORIDE 0.9 % (FLUSH) 0.9 %
5-40 SYRINGE (ML) INJECTION EVERY 12 HOURS SCHEDULED
Status: DISCONTINUED | OUTPATIENT
Start: 2024-06-28 | End: 2024-07-01 | Stop reason: HOSPADM

## 2024-06-28 RX ORDER — CLONAZEPAM 1 MG/1
1 TABLET ORAL 3 TIMES DAILY PRN
Status: DISCONTINUED | OUTPATIENT
Start: 2024-06-28 | End: 2024-07-01 | Stop reason: HOSPADM

## 2024-06-28 RX ORDER — OXYCODONE HYDROCHLORIDE AND ACETAMINOPHEN 5; 325 MG/1; MG/1
1 TABLET ORAL
Status: COMPLETED | OUTPATIENT
Start: 2024-06-28 | End: 2024-06-28

## 2024-06-28 RX ORDER — ONDANSETRON 4 MG/1
4 TABLET, ORALLY DISINTEGRATING ORAL EVERY 8 HOURS PRN
Status: DISCONTINUED | OUTPATIENT
Start: 2024-06-28 | End: 2024-07-01 | Stop reason: HOSPADM

## 2024-06-28 RX ORDER — GABAPENTIN 300 MG/1
600 CAPSULE ORAL EVERY EVENING
Status: DISCONTINUED | OUTPATIENT
Start: 2024-06-28 | End: 2024-07-01 | Stop reason: HOSPADM

## 2024-06-28 RX ORDER — ASPIRIN 81 MG/1
81 TABLET ORAL DAILY
Status: DISCONTINUED | OUTPATIENT
Start: 2024-06-28 | End: 2024-07-01 | Stop reason: HOSPADM

## 2024-06-28 RX ORDER — POLYETHYLENE GLYCOL 3350 17 G/17G
17 POWDER, FOR SOLUTION ORAL DAILY PRN
Status: DISCONTINUED | OUTPATIENT
Start: 2024-06-28 | End: 2024-07-01 | Stop reason: HOSPADM

## 2024-06-28 RX ORDER — SODIUM CHLORIDE 9 MG/ML
INJECTION, SOLUTION INTRAVENOUS PRN
Status: DISCONTINUED | OUTPATIENT
Start: 2024-06-28 | End: 2024-07-01 | Stop reason: HOSPADM

## 2024-06-28 RX ORDER — BUDESONIDE 0.5 MG/2ML
0.5 INHALANT ORAL
Status: DISCONTINUED | OUTPATIENT
Start: 2024-06-28 | End: 2024-07-01 | Stop reason: HOSPADM

## 2024-06-28 RX ORDER — ROSUVASTATIN CALCIUM 40 MG/1
40 TABLET, COATED ORAL EVERY EVENING
Status: DISCONTINUED | OUTPATIENT
Start: 2024-06-28 | End: 2024-07-01 | Stop reason: HOSPADM

## 2024-06-28 RX ORDER — GABAPENTIN 300 MG/1
300 CAPSULE ORAL EVERY MORNING
Status: DISCONTINUED | OUTPATIENT
Start: 2024-06-28 | End: 2024-07-01 | Stop reason: HOSPADM

## 2024-06-28 RX ORDER — DEXTROSE MONOHYDRATE 100 MG/ML
INJECTION, SOLUTION INTRAVENOUS CONTINUOUS PRN
Status: DISCONTINUED | OUTPATIENT
Start: 2024-06-28 | End: 2024-07-01 | Stop reason: HOSPADM

## 2024-06-28 RX ORDER — LISINOPRIL 10 MG/1
10 TABLET ORAL DAILY
Status: DISCONTINUED | OUTPATIENT
Start: 2024-06-28 | End: 2024-07-01 | Stop reason: HOSPADM

## 2024-06-28 RX ORDER — LEVETIRACETAM 500 MG/1
500 TABLET ORAL 2 TIMES DAILY
Status: DISCONTINUED | OUTPATIENT
Start: 2024-06-28 | End: 2024-07-01 | Stop reason: HOSPADM

## 2024-06-28 RX ORDER — ALBUTEROL SULFATE 90 UG/1
2 AEROSOL, METERED RESPIRATORY (INHALATION) EVERY 6 HOURS PRN
Status: DISCONTINUED | OUTPATIENT
Start: 2024-06-28 | End: 2024-07-01 | Stop reason: HOSPADM

## 2024-06-28 RX ORDER — GLUCAGON 1 MG/ML
1 KIT INJECTION PRN
Status: DISCONTINUED | OUTPATIENT
Start: 2024-06-28 | End: 2024-07-01 | Stop reason: HOSPADM

## 2024-06-28 RX ORDER — ARFORMOTEROL TARTRATE 15 UG/2ML
15 SOLUTION RESPIRATORY (INHALATION)
Status: DISCONTINUED | OUTPATIENT
Start: 2024-06-28 | End: 2024-07-01 | Stop reason: HOSPADM

## 2024-06-28 RX ORDER — POTASSIUM CHLORIDE 7.45 MG/ML
10 INJECTION INTRAVENOUS PRN
Status: DISCONTINUED | OUTPATIENT
Start: 2024-06-28 | End: 2024-07-01 | Stop reason: HOSPADM

## 2024-06-28 RX ORDER — LORAZEPAM 2 MG/ML
0.5 INJECTION INTRAMUSCULAR ONCE
Status: COMPLETED | OUTPATIENT
Start: 2024-06-28 | End: 2024-06-28

## 2024-06-28 RX ORDER — DONEPEZIL HYDROCHLORIDE 5 MG/1
5 TABLET, FILM COATED ORAL NIGHTLY
Status: DISCONTINUED | OUTPATIENT
Start: 2024-06-28 | End: 2024-07-01 | Stop reason: HOSPADM

## 2024-06-28 RX ADMIN — GABAPENTIN 600 MG: 300 CAPSULE ORAL at 20:41

## 2024-06-28 RX ADMIN — OXYCODONE HYDROCHLORIDE AND ACETAMINOPHEN 1 TABLET: 5; 325 TABLET ORAL at 21:16

## 2024-06-28 RX ADMIN — ROPINIROLE HYDROCHLORIDE 0.25 MG: 0.25 TABLET, FILM COATED ORAL at 02:20

## 2024-06-28 RX ADMIN — PANTOPRAZOLE SODIUM 40 MG: 40 TABLET, DELAYED RELEASE ORAL at 20:41

## 2024-06-28 RX ADMIN — WATER 1000 MG: 1 INJECTION INTRAMUSCULAR; INTRAVENOUS; SUBCUTANEOUS at 16:26

## 2024-06-28 RX ADMIN — HYDRALAZINE HYDROCHLORIDE 10 MG: 20 INJECTION INTRAMUSCULAR; INTRAVENOUS at 17:14

## 2024-06-28 RX ADMIN — ROPINIROLE HYDROCHLORIDE 0.25 MG: 0.25 TABLET, FILM COATED ORAL at 20:22

## 2024-06-28 RX ADMIN — Medication 10 ML: at 16:43

## 2024-06-28 RX ADMIN — ROSUVASTATIN CALCIUM 40 MG: 20 TABLET, FILM COATED ORAL at 20:41

## 2024-06-28 RX ADMIN — BUDESONIDE INHALATION 500 MCG: 0.5 SUSPENSION RESPIRATORY (INHALATION) at 20:31

## 2024-06-28 RX ADMIN — ROSUVASTATIN CALCIUM 40 MG: 20 TABLET, FILM COATED ORAL at 02:24

## 2024-06-28 RX ADMIN — DULOXETINE HYDROCHLORIDE 30 MG: 30 CAPSULE, DELAYED RELEASE ORAL at 02:21

## 2024-06-28 RX ADMIN — BUDESONIDE INHALATION 500 MCG: 0.5 SUSPENSION RESPIRATORY (INHALATION) at 07:35

## 2024-06-28 RX ADMIN — DONEPEZIL HYDROCHLORIDE 5 MG: 5 TABLET, FILM COATED ORAL at 20:22

## 2024-06-28 RX ADMIN — BUTALBITAL, ACETAMINOPHEN, AND CAFFEINE 1 TABLET: 50; 325; 40 TABLET ORAL at 02:21

## 2024-06-28 RX ADMIN — DONEPEZIL HYDROCHLORIDE 5 MG: 5 TABLET, FILM COATED ORAL at 02:20

## 2024-06-28 RX ADMIN — ACETAMINOPHEN 650 MG: 325 TABLET ORAL at 20:22

## 2024-06-28 RX ADMIN — BUPROPION HYDROCHLORIDE 150 MG: 150 TABLET, EXTENDED RELEASE ORAL at 20:22

## 2024-06-28 RX ADMIN — METOPROLOL TARTRATE 25 MG: 25 TABLET, FILM COATED ORAL at 16:51

## 2024-06-28 RX ADMIN — DULOXETINE HYDROCHLORIDE 30 MG: 30 CAPSULE, DELAYED RELEASE ORAL at 20:22

## 2024-06-28 RX ADMIN — INSULIN GLARGINE 15 UNITS: 100 INJECTION, SOLUTION SUBCUTANEOUS at 20:30

## 2024-06-28 RX ADMIN — LEVETIRACETAM 500 MG: 500 TABLET, FILM COATED ORAL at 02:20

## 2024-06-28 RX ADMIN — FERROUS SULFATE TAB 325 MG (65 MG ELEMENTAL FE) 325 MG: 325 (65 FE) TAB at 20:41

## 2024-06-28 RX ADMIN — LORAZEPAM 0.5 MG: 2 INJECTION INTRAMUSCULAR; INTRAVENOUS at 11:05

## 2024-06-28 RX ADMIN — TIOTROPIUM BROMIDE INHALATION SPRAY 2 PUFF: 3.12 SPRAY, METERED RESPIRATORY (INHALATION) at 07:35

## 2024-06-28 RX ADMIN — METOPROLOL TARTRATE 2.5 MG: 1 INJECTION, SOLUTION INTRAVENOUS at 16:55

## 2024-06-28 RX ADMIN — LEVETIRACETAM 500 MG: 500 TABLET, FILM COATED ORAL at 20:22

## 2024-06-28 RX ADMIN — ARFORMOTEROL TARTRATE 15 MCG: 15 SOLUTION RESPIRATORY (INHALATION) at 07:35

## 2024-06-28 RX ADMIN — HALOPERIDOL LACTATE 2 MG: 5 INJECTION, SOLUTION INTRAMUSCULAR at 11:29

## 2024-06-28 RX ADMIN — ONDANSETRON 4 MG: 2 INJECTION INTRAMUSCULAR; INTRAVENOUS at 02:20

## 2024-06-28 RX ADMIN — METOPROLOL TARTRATE 25 MG: 25 TABLET, FILM COATED ORAL at 20:22

## 2024-06-28 RX ADMIN — Medication 10 ML: at 02:21

## 2024-06-28 RX ADMIN — GABAPENTIN 600 MG: 300 CAPSULE ORAL at 02:20

## 2024-06-28 RX ADMIN — CLONAZEPAM 1 MG: 1 TABLET ORAL at 00:46

## 2024-06-28 RX ADMIN — ARFORMOTEROL TARTRATE 15 MCG: 15 SOLUTION RESPIRATORY (INHALATION) at 20:31

## 2024-06-28 RX ADMIN — ENOXAPARIN SODIUM 40 MG: 100 INJECTION SUBCUTANEOUS at 17:05

## 2024-06-28 RX ADMIN — ACETAMINOPHEN 650 MG: 325 TABLET ORAL at 00:46

## 2024-06-28 RX ADMIN — METOPROLOL TARTRATE 25 MG: 25 TABLET, FILM COATED ORAL at 02:20

## 2024-06-28 RX ADMIN — Medication 10 ML: at 20:43

## 2024-06-28 ASSESSMENT — PAIN SCALES - WONG BAKER
WONGBAKER_NUMERICALRESPONSE: HURTS A LITTLE BIT
WONGBAKER_NUMERICALRESPONSE: HURTS A LITTLE BIT
WONGBAKER_NUMERICALRESPONSE: 0;2
WONGBAKER_NUMERICALRESPONSE: HURTS A LITTLE BIT

## 2024-06-28 ASSESSMENT — PAIN DESCRIPTION - LOCATION
LOCATION: HEAD
LOCATION: BACK;ABDOMEN
LOCATION: ABDOMEN;BACK
LOCATION: HEAD
LOCATION: ABDOMEN;BACK

## 2024-06-28 ASSESSMENT — PAIN SCALES - GENERAL
PAINLEVEL_OUTOF10: 7
PAINLEVEL_OUTOF10: 2
PAINLEVEL_OUTOF10: 0
PAINLEVEL_OUTOF10: 2
PAINLEVEL_OUTOF10: 0
PAINLEVEL_OUTOF10: 7
PAINLEVEL_OUTOF10: 2
PAINLEVEL_OUTOF10: 2
PAINLEVEL_OUTOF10: 0
PAINLEVEL_OUTOF10: 0
PAINLEVEL_OUTOF10: 6
PAINLEVEL_OUTOF10: 2
PAINLEVEL_OUTOF10: 2
PAINLEVEL_OUTOF10: 7
PAINLEVEL_OUTOF10: 2
PAINLEVEL_OUTOF10: 2
PAINLEVEL_OUTOF10: 0
PAINLEVEL_OUTOF10: 4
PAINLEVEL_OUTOF10: 0

## 2024-06-28 ASSESSMENT — PAIN DESCRIPTION - DESCRIPTORS
DESCRIPTORS: ACHING

## 2024-06-28 ASSESSMENT — PAIN DESCRIPTION - ORIENTATION
ORIENTATION: OTHER (COMMENT)
ORIENTATION: RIGHT;LEFT
ORIENTATION: RIGHT;LEFT
ORIENTATION: OTHER (COMMENT)

## 2024-06-28 ASSESSMENT — PAIN - FUNCTIONAL ASSESSMENT
PAIN_FUNCTIONAL_ASSESSMENT: PREVENTS OR INTERFERES WITH MANY ACTIVE NOT PASSIVE ACTIVITIES
PAIN_FUNCTIONAL_ASSESSMENT: PREVENTS OR INTERFERES WITH MANY ACTIVE NOT PASSIVE ACTIVITIES

## 2024-06-28 NOTE — CARE COORDINATION
Case Management Assessment  Initial Evaluation    Date/Time of Evaluation: 6/28/2024 1:44 PM  Assessment Completed by: BÁRBARA Donnelly    If patient is discharged prior to next notation, then this note serves as note for discharge by case management.    Patient Name: Viji Gamez                   YOB: 1950  Diagnosis: Acute encephalopathy [G93.40]                   Date / Time: 6/27/2024  7:03 PM    Patient Admission Status: Inpatient   Readmission Risk (Low < 19, Mod (19-27), High > 27): Readmission Risk Score: 18.3    Current PCP: No primary care provider on file.  PCP verified by CM? No    Chart Reviewed: Yes      History Provided by: Patient  Patient Orientation: Alert and Oriented    Patient Cognition: Alert    Hospitalization in the last 30 days (Readmission):  No    If yes, Readmission Assessment in  Navigator will be completed.    Advance Directives:      Code Status: Full Code   Patient's Primary Decision Maker is: Legal Next of Kin      Discharge Planning:    Patient lives with: Alone Type of Home: Apartment  Primary Care Giver: Family  Patient Support Systems include: Children   Current Financial resources: Medicaid  Current community resources: None  Current services prior to admission: Oxygen Therapy            Current DME:              Type of Home Care services:  [facility])    ADLS  Prior functional level: Independent in ADLs/IADLs  Current functional level: Assistance with the following:, Bathing, Dressing, Mobility, Toileting    PT AM-PAC:   /24  OT AM-PAC:   /24    Family can provide assistance at DC: Yes  Would you like Case Management to discuss the discharge plan with any other family members/significant others, and if so, who? No  Plans to Return to Present Housing: Unknown at present  Other Identified Issues/Barriers to RETURNING to current housing: Yes  Potential Assistance needed at discharge: Skilled Nursing Facility            Potential DME:

## 2024-06-28 NOTE — RT PROTOCOL NOTE
RT Nebulizer Bronchodilator Protocol Note    There is a bronchodilator order in the chart from a provider indicating to follow the RT Bronchodilator Protocol and there is an “Initiate RT Bronchodilator Protocol” order as well (see protocol at bottom of note).    CXR Findings:  XR CHEST PORTABLE    Result Date: 6/27/2024  Right basilar density favored to reflect atelectasis. Electronically signed by Chris Amaya MD      The findings from the last RT Protocol Assessment were as follows:  Smoking: Chronic pulmonary disease  Respiratory Pattern: Regular pattern and RR 12-20 bpm  Breath Sounds: Clear breath sounds  Cough: Strong, spontaneous, non-productive  Indication for Bronchodilator Therapy: On home bronchodilators  Bronchodilator Assessment Score: 2    Aerosolized bronchodilator medication orders have been revised according to the RT Nebulizer Bronchodilator Protocol below.    Respiratory Therapist to perform RT Therapy Protocol Assessment initially then follow the protocol.  Repeat RT Therapy Protocol Assessment PRN for score 0-3 or on second treatment, BID, and PRN for scores above 3.    No Indications - adjust the frequency to every 6 hours PRN wheezing or bronchospasm, if no treatments needed after 48 hours then discontinue using Per Protocol order mode.     If indication present, adjust the RT bronchodilator orders based on the Bronchodilator Assessment Score as indicated below.  If a patient is on this medication at home then do not decrease Frequency below that used at home.    0-3 - enter or revise RT bronchodilator order(s) to equivalent RT Bronchodilator order with Frequency of every 4 hours PRN for wheezing or increased work of breathing using Per Protocol order mode.       4-6 - enter or revise RT Bronchodilator order(s) to two equivalent RT bronchodilator orders with one order with BID Frequency and one order with Frequency of every 4 hours PRN wheezing or increased work of breathing using Per Protocol

## 2024-06-28 NOTE — FLOWSHEET NOTE
06/28/24 1634   Vital Signs   Pulse (!) 123   Heart Rate Source Monitor   Respirations 18   BP (!) 187/142   MAP (Calculated) 157   BP Location Right upper arm   BP Method Automatic   Patient Position Semi fowlers     CNP notified. Pt diaphoretic. Pt confused but calm. Awake, alert, answering questions.

## 2024-06-28 NOTE — ED NOTES
Patient reports she has history of afib and has been taken off Cardizem and Digoxin last time she was at the hospital, unsure reasoning behind it. Patient reports abdominal pain that feels like bloating, also some back pain. She reports having some foot pain d/t her neuropathy and swelling in lower extremities, no swelling noted. She states a few times during interactions that she has been urinating a lot and feels like it is dribbling sometimes. Patient changed into gown and purwick in place for patient. Patient adjusted in bed for comfort. Denies other needs at this time.   
Patient returned from imaging.   
    Critical values: no     Abnormal Assessment Findings: patient found wondering outside having hx of agoraphobia, was seen at [facility] ER for hallucinations earlier today. Patient alert and oriented for ER visit here. Patient complaints of bilateral foot pain with hx neuropathy. Also complaining of dysuria.     Background  History:   Past Medical History:   Diagnosis Date    A-fib (HCC)     COPD (chronic obstructive pulmonary disease) (HCC)     Diabetes mellitus (HCC)     Hypertension        Assessment    Vitals/MEWS: MEWS Score: 4  Level of Consciousness: Alert (0)   Vitals:    06/27/24 2031 06/27/24 2055 06/27/24 2143 06/27/24 2200   BP: (!) 165/84  (!) 192/113 (!) 178/113   Pulse: (!) 124 (!) 110 (!) 122 99   Resp:       Temp:       TempSrc:       SpO2: 91% 93%  94%   Weight:       Height:         FiO2 (%): n/a  O2 Flow Rate: O2 Device: None (Room air)    Cardiac Rhythm:    Pain Assessment:  [] Verbal [] Hurtado Baker Scale  Pain Scale: Pain Assessment  Pain Assessment: 0-10  Pain Level: 10  Pain Location: Abdomen  Last documented pain score (0-10 scale) Pain Level: 10  Last documented pain medication administered: n/a  Mental Status: oriented and alert  Orientation Level:    NIH Score:    C-SSRS: Risk of Suicide: No Risk  Bedside swallow:    [place] Coma Scale (GCS):    Active LDA's:   Peripheral IV 06/27/24 Posterior;Right Forearm (Active)   Site Assessment Clean, dry & intact 06/27/24 1915   Line Status Blood return noted;Flushed;Normal saline locked;Specimen collected 06/27/24 1915   Phlebitis Assessment No symptoms 06/27/24 1915   Infiltration Assessment 0 06/27/24 1915   Dressing Status New dressing applied 06/27/24 1915   Dressing Type Transparent 06/27/24 1915   Dressing Intervention New 06/27/24 1915       Peripheral IV 06/27/24 Right Forearm (Active)                 PO Status: Regular  Pertinent or High Risk Medications/Drips: no   If Yes, please provide details:   Pending Blood Product

## 2024-06-28 NOTE — ED PROVIDER NOTES
(PRINIVIL;ZESTRIL) 10 MG TABLET    Take 1 tablet by mouth daily    METFORMIN (GLUCOPHAGE-XR) 500 MG EXTENDED RELEASE TABLET    Take 1 tablet by mouth 2 times daily    METOPROLOL TARTRATE (LOPRESSOR) 50 MG TABLET    Take 0.5 tablets by mouth 2 times daily    MOMETASONE-FORMOTEROL (DULERA) 200-5 MCG/ACT INHALER    Inhale 2 puffs into the lungs in the morning and 2 puffs in the evening.    MULTIPLE VITAMINS-MINERALS (THERAPEUTIC MULTIVITAMIN-MINERALS) TABLET    Take 1 tablet by mouth daily    PANTOPRAZOLE (PROTONIX) 40 MG TABLET    Take 1 tablet by mouth daily    ROPINIROLE (REQUIP) 0.25 MG TABLET    Take 1 tablet by mouth in the morning and at bedtime    ROSUVASTATIN (CRESTOR) 40 MG TABLET    Take 1 tablet by mouth every evening    TIOTROPIUM (SPIRIVA) 18 MCG INHALATION CAPSULE    Inhale 1 capsule into the lungs daily      SCREENINGS                           CIWA Assessment  BP: (!) 165/84  Pulse: (!) 124                  PHYSICAL EXAM :  ED Triage Vitals   BP Temp Temp Source Pulse Respirations SpO2 Height Weight - Scale   06/27/24 1910 06/27/24 1910 06/27/24 1910 06/27/24 1910 06/27/24 1910 06/27/24 1910 06/27/24 1928 06/27/24 1928   (!) 187/118 99.1 °F (37.3 °C) Oral (!) 130 20 93 % 1.524 m (5') 55.3 kg (122 lb)      GENERAL APPEARANCE: Awake alert oriented x 3 resting comfortably  HEAD: Normocephalic. Atraumatic.  EYES: SHAN. EOM's grossly intact.   ENT: Mucous membranes slightly dry  NECK: Supple, trachea midline.   HEART: Irregularly irregular and tachycardic at 140-150 bpm  LUNGS: Clear to auscultation.  No wheezes crackles or rhonchi or accessory muscle use.  Thorax nontender no bruising crepitus deformity or rashes.  ABDOMEN: Soft.  Nontender nondistended positive bowel sounds.  No masses guarding rebound rigidity  EXTREMITIES: Pulses equal bilaterally.  Full range of motion all extremities  MUSCULOSKELETAL: No midline bony tenderness.  No flank or CVA tenderness.  SKIN: Warm and dry. No acute rashes.  No

## 2024-06-28 NOTE — H&P
Ender Paez MD   ferrous sulfate (IRON 325) 325 (65 Fe) MG tablet Take 1 tablet by mouth daily (with breakfast) 4/9/24   Ender Paez MD   lisinopril (PRINIVIL;ZESTRIL) 10 MG tablet Take 1 tablet by mouth daily 4/9/24   Ender Paez MD   donepezil (ARICEPT) 5 MG tablet Take 1 tablet by mouth nightly 4/9/24   Ender Paez MD   rOPINIRole (REQUIP) 0.25 MG tablet Take 1 tablet by mouth in the morning and at bedtime 4/9/24   Ender Paez MD   metoprolol tartrate (LOPRESSOR) 50 MG tablet Take 0.5 tablets by mouth 2 times daily 4/8/24   Chris Mukherjee MD   albuterol sulfate HFA (VENTOLIN HFA) 108 (90 Base) MCG/ACT inhaler Inhale 2 puffs into the lungs every 6 hours as needed for Wheezing or Shortness of Breath    Sunny Barroso MD   aspirin 81 MG EC tablet Take 1 tablet by mouth daily    Sunny Barroso MD   buPROPion (WELLBUTRIN SR) 150 MG extended release tablet Take 1 tablet by mouth 2 times daily    Sunny Barroso MD   acetaminophen-butalbital  MG TABS Take 1 tablet by mouth every 4 hours as needed for Headaches    Sunny Barroso MD   clonazePAM (KLONOPIN) 1 MG tablet Take 1 tablet by mouth 3 times daily as needed for Anxiety.    Sunny Barroso MD   ergocalciferol (ERGOCALCIFEROL) 1.25 MG (74598 UT) capsule Take 1 capsule by mouth once a week On Sundays    Sunny Barroso MD   gabapentin (NEURONTIN) 300 MG capsule Take 1 capsule by mouth every morning.    Sunny Barroso MD   gabapentin (NEURONTIN) 600 MG tablet Take 1 tablet by mouth every evening.    Sunny Barroso MD   insulin glargine (LANTUS) 100 UNIT/ML injection vial Inject 34 Units into the skin daily    Sunny Barroso MD   metFORMIN (GLUCOPHAGE-XR) 500 MG extended release tablet Take 1 tablet by mouth 2 times daily    Sunny Barroso MD   mometasone-formoterol (DULERA) 200-5 MCG/ACT inhaler Inhale 2 puffs into the lungs in the morning and 2 puffs in the evening.    [place]

## 2024-06-28 NOTE — FLOWSHEET NOTE
06/28/24 1724   Vital Signs   Pulse (!) 109   Heart Rate Source Monitor   Respirations 18   BP (!) 177/117   MAP (Calculated) 137   BP Location Right upper arm   BP Method Automatic   Patient Position Semi fowlers     Vitas after hydralazine as above. Pt remains diaphoretic. CNP aware.

## 2024-06-29 LAB
EST. AVERAGE GLUCOSE BLD GHB EST-MCNC: 131 MG/DL
GLUCOSE BLD-MCNC: 120 MG/DL (ref 70–99)
GLUCOSE BLD-MCNC: 126 MG/DL (ref 70–99)
GLUCOSE BLD-MCNC: 130 MG/DL (ref 70–99)
GLUCOSE BLD-MCNC: 144 MG/DL (ref 70–99)
GLUCOSE BLD-MCNC: 146 MG/DL (ref 70–99)
HBA1C MFR BLD: 6.2 %

## 2024-06-29 PROCEDURE — 94640 AIRWAY INHALATION TREATMENT: CPT

## 2024-06-29 PROCEDURE — 6360000002 HC RX W HCPCS: Performed by: INTERNAL MEDICINE

## 2024-06-29 PROCEDURE — 6370000000 HC RX 637 (ALT 250 FOR IP): Performed by: INTERNAL MEDICINE

## 2024-06-29 PROCEDURE — 97535 SELF CARE MNGMENT TRAINING: CPT

## 2024-06-29 PROCEDURE — 82962 GLUCOSE BLOOD TEST: CPT

## 2024-06-29 PROCEDURE — 99223 1ST HOSP IP/OBS HIGH 75: CPT | Performed by: PSYCHIATRY & NEUROLOGY

## 2024-06-29 PROCEDURE — 6360000002 HC RX W HCPCS: Performed by: NURSE PRACTITIONER

## 2024-06-29 PROCEDURE — 94761 N-INVAS EAR/PLS OXIMETRY MLT: CPT

## 2024-06-29 PROCEDURE — 6370000000 HC RX 637 (ALT 250 FOR IP): Performed by: NURSE PRACTITIONER

## 2024-06-29 PROCEDURE — 97166 OT EVAL MOD COMPLEX 45 MIN: CPT

## 2024-06-29 PROCEDURE — 97162 PT EVAL MOD COMPLEX 30 MIN: CPT

## 2024-06-29 PROCEDURE — 97116 GAIT TRAINING THERAPY: CPT

## 2024-06-29 PROCEDURE — 97530 THERAPEUTIC ACTIVITIES: CPT

## 2024-06-29 PROCEDURE — 2700000000 HC OXYGEN THERAPY PER DAY

## 2024-06-29 PROCEDURE — 2580000003 HC RX 258: Performed by: NURSE PRACTITIONER

## 2024-06-29 PROCEDURE — 2580000003 HC RX 258: Performed by: INTERNAL MEDICINE

## 2024-06-29 PROCEDURE — 2060000000 HC ICU INTERMEDIATE R&B

## 2024-06-29 RX ORDER — KETOROLAC TROMETHAMINE 15 MG/ML
15 INJECTION, SOLUTION INTRAMUSCULAR; INTRAVENOUS ONCE
Status: COMPLETED | OUTPATIENT
Start: 2024-06-29 | End: 2024-06-29

## 2024-06-29 RX ADMIN — BUDESONIDE INHALATION 500 MCG: 0.5 SUSPENSION RESPIRATORY (INHALATION) at 20:14

## 2024-06-29 RX ADMIN — ACETAMINOPHEN 650 MG: 325 TABLET ORAL at 08:59

## 2024-06-29 RX ADMIN — ENOXAPARIN SODIUM 40 MG: 100 INJECTION SUBCUTANEOUS at 09:01

## 2024-06-29 RX ADMIN — BUTALBITAL, ACETAMINOPHEN, AND CAFFEINE 1 TABLET: 50; 325; 40 TABLET ORAL at 01:08

## 2024-06-29 RX ADMIN — PANTOPRAZOLE SODIUM 40 MG: 40 TABLET, DELAYED RELEASE ORAL at 08:59

## 2024-06-29 RX ADMIN — CLONAZEPAM 1 MG: 1 TABLET ORAL at 20:58

## 2024-06-29 RX ADMIN — LEVETIRACETAM 500 MG: 500 TABLET, FILM COATED ORAL at 09:00

## 2024-06-29 RX ADMIN — DULOXETINE HYDROCHLORIDE 30 MG: 30 CAPSULE, DELAYED RELEASE ORAL at 20:04

## 2024-06-29 RX ADMIN — TIOTROPIUM BROMIDE INHALATION SPRAY 2 PUFF: 3.12 SPRAY, METERED RESPIRATORY (INHALATION) at 08:28

## 2024-06-29 RX ADMIN — BUPROPION HYDROCHLORIDE 150 MG: 150 TABLET, EXTENDED RELEASE ORAL at 20:04

## 2024-06-29 RX ADMIN — METOPROLOL TARTRATE 25 MG: 25 TABLET, FILM COATED ORAL at 09:00

## 2024-06-29 RX ADMIN — KETOROLAC TROMETHAMINE 15 MG: 15 INJECTION, SOLUTION INTRAMUSCULAR; INTRAVENOUS at 18:54

## 2024-06-29 RX ADMIN — FERROUS SULFATE TAB 325 MG (65 MG ELEMENTAL FE) 325 MG: 325 (65 FE) TAB at 09:00

## 2024-06-29 RX ADMIN — ASPIRIN 81 MG: 81 TABLET, COATED ORAL at 09:00

## 2024-06-29 RX ADMIN — SALINE NASAL SPRAY 2 SPRAY: 1.5 SOLUTION NASAL at 14:07

## 2024-06-29 RX ADMIN — BUTALBITAL, ACETAMINOPHEN, AND CAFFEINE 1 TABLET: 50; 325; 40 TABLET ORAL at 14:07

## 2024-06-29 RX ADMIN — Medication 10 ML: at 20:05

## 2024-06-29 RX ADMIN — Medication 10 ML: at 09:00

## 2024-06-29 RX ADMIN — ACETAMINOPHEN 650 MG: 325 TABLET ORAL at 20:57

## 2024-06-29 RX ADMIN — ROPINIROLE HYDROCHLORIDE 0.25 MG: 0.25 TABLET, FILM COATED ORAL at 09:00

## 2024-06-29 RX ADMIN — WATER 1000 MG: 1 INJECTION INTRAMUSCULAR; INTRAVENOUS; SUBCUTANEOUS at 16:18

## 2024-06-29 RX ADMIN — CLONAZEPAM 1 MG: 1 TABLET ORAL at 16:17

## 2024-06-29 RX ADMIN — ARFORMOTEROL TARTRATE 15 MCG: 15 SOLUTION RESPIRATORY (INHALATION) at 20:14

## 2024-06-29 RX ADMIN — GABAPENTIN 300 MG: 300 CAPSULE ORAL at 09:00

## 2024-06-29 RX ADMIN — ROPINIROLE HYDROCHLORIDE 0.25 MG: 0.25 TABLET, FILM COATED ORAL at 20:04

## 2024-06-29 RX ADMIN — GABAPENTIN 600 MG: 300 CAPSULE ORAL at 17:37

## 2024-06-29 RX ADMIN — ROSUVASTATIN CALCIUM 40 MG: 20 TABLET, FILM COATED ORAL at 17:37

## 2024-06-29 RX ADMIN — LISINOPRIL 10 MG: 10 TABLET ORAL at 08:59

## 2024-06-29 RX ADMIN — INSULIN GLARGINE 15 UNITS: 100 INJECTION, SOLUTION SUBCUTANEOUS at 20:03

## 2024-06-29 RX ADMIN — METOPROLOL TARTRATE 25 MG: 25 TABLET, FILM COATED ORAL at 20:04

## 2024-06-29 RX ADMIN — BUPROPION HYDROCHLORIDE 150 MG: 150 TABLET, EXTENDED RELEASE ORAL at 08:59

## 2024-06-29 RX ADMIN — DONEPEZIL HYDROCHLORIDE 5 MG: 5 TABLET, FILM COATED ORAL at 20:03

## 2024-06-29 RX ADMIN — LEVETIRACETAM 500 MG: 500 TABLET, FILM COATED ORAL at 20:04

## 2024-06-29 RX ADMIN — DULOXETINE HYDROCHLORIDE 30 MG: 30 CAPSULE, DELAYED RELEASE ORAL at 09:00

## 2024-06-29 RX ADMIN — BUDESONIDE INHALATION 500 MCG: 0.5 SUSPENSION RESPIRATORY (INHALATION) at 08:28

## 2024-06-29 RX ADMIN — CLONAZEPAM 1 MG: 1 TABLET ORAL at 09:00

## 2024-06-29 RX ADMIN — ARFORMOTEROL TARTRATE 15 MCG: 15 SOLUTION RESPIRATORY (INHALATION) at 08:28

## 2024-06-29 ASSESSMENT — PAIN SCALES - GENERAL
PAINLEVEL_OUTOF10: 6
PAINLEVEL_OUTOF10: 5
PAINLEVEL_OUTOF10: 0
PAINLEVEL_OUTOF10: 0
PAINLEVEL_OUTOF10: 4
PAINLEVEL_OUTOF10: 2
PAINLEVEL_OUTOF10: 0
PAINLEVEL_OUTOF10: 5
PAINLEVEL_OUTOF10: 3
PAINLEVEL_OUTOF10: 6
PAINLEVEL_OUTOF10: 2

## 2024-06-29 ASSESSMENT — PAIN DESCRIPTION - ORIENTATION
ORIENTATION: MID
ORIENTATION: OTHER (COMMENT)
ORIENTATION: MID
ORIENTATION: LOWER

## 2024-06-29 ASSESSMENT — PAIN DESCRIPTION - DESCRIPTORS
DESCRIPTORS: ACHING

## 2024-06-29 ASSESSMENT — PAIN - FUNCTIONAL ASSESSMENT
PAIN_FUNCTIONAL_ASSESSMENT: ACTIVITIES ARE NOT PREVENTED

## 2024-06-29 ASSESSMENT — PAIN DESCRIPTION - ONSET
ONSET: ON-GOING

## 2024-06-29 ASSESSMENT — PAIN DESCRIPTION - FREQUENCY
FREQUENCY: INTERMITTENT

## 2024-06-29 ASSESSMENT — PAIN DESCRIPTION - LOCATION
LOCATION: HEAD
LOCATION: HEAD
LOCATION: GENERALIZED
LOCATION: HEAD
LOCATION: BACK

## 2024-06-29 ASSESSMENT — PAIN DESCRIPTION - PAIN TYPE
TYPE: ACUTE PAIN

## 2024-06-29 ASSESSMENT — PAIN SCALES - WONG BAKER
WONGBAKER_NUMERICALRESPONSE: NO HURT
WONGBAKER_NUMERICALRESPONSE: NO HURT

## 2024-06-29 NOTE — CONSULTS
from neurology once medically stable  Please call for questions        Thank you for referring such patient. If you have any questions regarding my consult note, please don't hesitate to call me.     Ema Limon MD  [phone]    This dictation was generated by voice recognition computer software. Although all attempts are made to edit the dictation for accuracy, there may be errors in the  transcription that are not intended

## 2024-06-29 NOTE — FLOWSHEET NOTE
06/29/24 0722   Vital Signs   Temp 97.5 °F (36.4 °C)   Temp Source Oral   Pulse 95   Heart Rate Source Monitor   Respirations 16   BP (!) 130/97   MAP (Calculated) 108   BP Location Left upper arm   BP Method Automatic   Patient Position Semi fowlers   Oxygen Therapy   SpO2 99 %     Shift assessment completed, patient awake and alert sitting up in chair. Morning medications and PRN Klonopin and PRN Tylenol given per request, see MAR. Fluids given per request. Patient denies any further concerns at this time. Call light within reach.

## 2024-06-30 LAB
ANION GAP SERPL CALCULATED.3IONS-SCNC: 14 MMOL/L (ref 3–16)
ANION GAP SERPL CALCULATED.3IONS-SCNC: 16 MMOL/L (ref 3–16)
BASOPHILS # BLD: 0.07 K/UL (ref 0–0.2)
BASOPHILS NFR BLD: 1 %
BUN SERPL-MCNC: 16 MG/DL (ref 7–20)
BUN SERPL-MCNC: 19 MG/DL (ref 7–20)
CALCIUM SERPL-MCNC: 8.8 MG/DL (ref 8.3–10.6)
CALCIUM SERPL-MCNC: 9 MG/DL (ref 8.3–10.6)
CHLORIDE SERPL-SCNC: 96 MMOL/L (ref 99–110)
CHLORIDE SERPL-SCNC: 97 MMOL/L (ref 99–110)
CO2 SERPL-SCNC: 19 MMOL/L (ref 21–32)
CO2 SERPL-SCNC: 25 MMOL/L (ref 21–32)
CREAT SERPL-MCNC: 0.8 MG/DL (ref 0.6–1.2)
CREAT SERPL-MCNC: 0.8 MG/DL (ref 0.6–1.2)
EOSINOPHIL # BLD: 0.46 K/UL (ref 0–0.6)
EOSINOPHILS RELATIVE PERCENT: 5 %
ERYTHROCYTE [DISTWIDTH] IN BLOOD BY AUTOMATED COUNT: 13.7 % (ref 12.4–15.4)
GFR, ESTIMATED: 77 ML/MIN/1.73M2
GFR, ESTIMATED: 79 ML/MIN/1.73M2
GLUCOSE BLD-MCNC: 128 MG/DL (ref 70–99)
GLUCOSE BLD-MCNC: 158 MG/DL (ref 70–99)
GLUCOSE BLD-MCNC: 89 MG/DL (ref 70–99)
GLUCOSE BLD-MCNC: 93 MG/DL (ref 70–99)
GLUCOSE SERPL-MCNC: 117 MG/DL (ref 70–99)
GLUCOSE SERPL-MCNC: 132 MG/DL (ref 70–99)
HCT VFR BLD AUTO: 37.8 % (ref 36–48)
HGB BLD-MCNC: 12.2 G/DL (ref 12–16)
IMM GRANULOCYTES # BLD AUTO: 0.05 K/UL (ref 0–0.5)
IMM GRANULOCYTES NFR BLD: 1 %
LYMPHOCYTES NFR BLD: 1.43 K/UL (ref 1–5.1)
LYMPHOCYTES RELATIVE PERCENT: 17 %
MCH RBC QN AUTO: 28.5 PG (ref 26–34)
MCHC RBC AUTO-ENTMCNC: 32.3 G/DL (ref 31–36)
MCV RBC AUTO: 88.3 FL (ref 80–100)
MICROORGANISM SPEC CULT: ABNORMAL
MICROORGANISM SPEC CULT: ABNORMAL
MONOCYTES NFR BLD: 1.23 K/UL (ref 0–1.3)
MONOCYTES NFR BLD: 15 %
NEUTROPHILS NFR BLD: 62 %
NEUTS SEG NFR BLD: 5.21 K/UL (ref 1.7–7.7)
PLATELET # BLD AUTO: 362 K/UL (ref 135–450)
PMV BLD AUTO: 10 FL
POTASSIUM SERPL-SCNC: 3.6 MMOL/L (ref 3.5–5.1)
POTASSIUM SERPL-SCNC: 4 MMOL/L (ref 3.5–5.1)
RBC # BLD AUTO: 4.28 M/UL (ref 4–5.2)
SODIUM SERPL-SCNC: 130 MMOL/L (ref 136–145)
SODIUM SERPL-SCNC: 135 MMOL/L (ref 136–145)
SPECIMEN DESCRIPTION: ABNORMAL
WBC OTHER # BLD: 8.5 K/UL (ref 4–11)

## 2024-06-30 PROCEDURE — 80048 BASIC METABOLIC PNL TOTAL CA: CPT

## 2024-06-30 PROCEDURE — 2700000000 HC OXYGEN THERAPY PER DAY

## 2024-06-30 PROCEDURE — 36415 COLL VENOUS BLD VENIPUNCTURE: CPT

## 2024-06-30 PROCEDURE — 6370000000 HC RX 637 (ALT 250 FOR IP): Performed by: INTERNAL MEDICINE

## 2024-06-30 PROCEDURE — 94761 N-INVAS EAR/PLS OXIMETRY MLT: CPT

## 2024-06-30 PROCEDURE — 2060000000 HC ICU INTERMEDIATE R&B

## 2024-06-30 PROCEDURE — 2580000003 HC RX 258: Performed by: INTERNAL MEDICINE

## 2024-06-30 PROCEDURE — 85025 COMPLETE CBC W/AUTO DIFF WBC: CPT

## 2024-06-30 PROCEDURE — 6370000000 HC RX 637 (ALT 250 FOR IP): Performed by: NURSE PRACTITIONER

## 2024-06-30 PROCEDURE — 6360000002 HC RX W HCPCS: Performed by: INTERNAL MEDICINE

## 2024-06-30 PROCEDURE — 82962 GLUCOSE BLOOD TEST: CPT

## 2024-06-30 PROCEDURE — 94640 AIRWAY INHALATION TREATMENT: CPT

## 2024-06-30 RX ORDER — OXYCODONE HYDROCHLORIDE 5 MG/1
5 TABLET ORAL EVERY 8 HOURS PRN
Status: DISCONTINUED | OUTPATIENT
Start: 2024-06-30 | End: 2024-07-01 | Stop reason: HOSPADM

## 2024-06-30 RX ORDER — CIPROFLOXACIN 500 MG/1
500 TABLET, FILM COATED ORAL EVERY 12 HOURS SCHEDULED
Status: DISCONTINUED | OUTPATIENT
Start: 2024-06-30 | End: 2024-07-01 | Stop reason: HOSPADM

## 2024-06-30 RX ADMIN — CLONAZEPAM 1 MG: 1 TABLET ORAL at 20:41

## 2024-06-30 RX ADMIN — OXYCODONE 5 MG: 5 TABLET ORAL at 17:48

## 2024-06-30 RX ADMIN — CIPROFLOXACIN 500 MG: 500 TABLET, FILM COATED ORAL at 20:41

## 2024-06-30 RX ADMIN — CLONAZEPAM 1 MG: 1 TABLET ORAL at 08:15

## 2024-06-30 RX ADMIN — BUPROPION HYDROCHLORIDE 150 MG: 150 TABLET, EXTENDED RELEASE ORAL at 08:15

## 2024-06-30 RX ADMIN — LEVETIRACETAM 500 MG: 500 TABLET, FILM COATED ORAL at 20:40

## 2024-06-30 RX ADMIN — LISINOPRIL 10 MG: 10 TABLET ORAL at 08:15

## 2024-06-30 RX ADMIN — ROPINIROLE HYDROCHLORIDE 0.25 MG: 0.25 TABLET, FILM COATED ORAL at 20:41

## 2024-06-30 RX ADMIN — SALINE NASAL SPRAY 2 SPRAY: 1.5 SOLUTION NASAL at 08:15

## 2024-06-30 RX ADMIN — METOPROLOL TARTRATE 25 MG: 25 TABLET, FILM COATED ORAL at 08:15

## 2024-06-30 RX ADMIN — GABAPENTIN 600 MG: 300 CAPSULE ORAL at 17:48

## 2024-06-30 RX ADMIN — SALINE NASAL SPRAY 2 SPRAY: 1.5 SOLUTION NASAL at 14:15

## 2024-06-30 RX ADMIN — DONEPEZIL HYDROCHLORIDE 5 MG: 5 TABLET, FILM COATED ORAL at 20:41

## 2024-06-30 RX ADMIN — ASPIRIN 81 MG: 81 TABLET, COATED ORAL at 08:15

## 2024-06-30 RX ADMIN — ROSUVASTATIN CALCIUM 40 MG: 20 TABLET, FILM COATED ORAL at 17:48

## 2024-06-30 RX ADMIN — FERROUS SULFATE TAB 325 MG (65 MG ELEMENTAL FE) 325 MG: 325 (65 FE) TAB at 08:15

## 2024-06-30 RX ADMIN — ARFORMOTEROL TARTRATE 15 MCG: 15 SOLUTION RESPIRATORY (INHALATION) at 19:36

## 2024-06-30 RX ADMIN — BUPROPION HYDROCHLORIDE 150 MG: 150 TABLET, EXTENDED RELEASE ORAL at 20:41

## 2024-06-30 RX ADMIN — INSULIN GLARGINE 15 UNITS: 100 INJECTION, SOLUTION SUBCUTANEOUS at 20:38

## 2024-06-30 RX ADMIN — ROPINIROLE HYDROCHLORIDE 0.25 MG: 0.25 TABLET, FILM COATED ORAL at 08:15

## 2024-06-30 RX ADMIN — CIPROFLOXACIN 500 MG: 500 TABLET, FILM COATED ORAL at 11:58

## 2024-06-30 RX ADMIN — ACETAMINOPHEN 650 MG: 325 TABLET ORAL at 14:15

## 2024-06-30 RX ADMIN — ACETAMINOPHEN 650 MG: 325 TABLET ORAL at 08:15

## 2024-06-30 RX ADMIN — TIOTROPIUM BROMIDE INHALATION SPRAY 2 PUFF: 3.12 SPRAY, METERED RESPIRATORY (INHALATION) at 08:04

## 2024-06-30 RX ADMIN — ARFORMOTEROL TARTRATE 15 MCG: 15 SOLUTION RESPIRATORY (INHALATION) at 08:04

## 2024-06-30 RX ADMIN — BUDESONIDE INHALATION 500 MCG: 0.5 SUSPENSION RESPIRATORY (INHALATION) at 19:36

## 2024-06-30 RX ADMIN — CLONAZEPAM 1 MG: 1 TABLET ORAL at 15:37

## 2024-06-30 RX ADMIN — Medication 10 ML: at 08:21

## 2024-06-30 RX ADMIN — LEVETIRACETAM 500 MG: 500 TABLET, FILM COATED ORAL at 08:15

## 2024-06-30 RX ADMIN — PANTOPRAZOLE SODIUM 40 MG: 40 TABLET, DELAYED RELEASE ORAL at 08:15

## 2024-06-30 RX ADMIN — GABAPENTIN 300 MG: 300 CAPSULE ORAL at 08:15

## 2024-06-30 RX ADMIN — BUDESONIDE INHALATION 500 MCG: 0.5 SUSPENSION RESPIRATORY (INHALATION) at 08:04

## 2024-06-30 RX ADMIN — ENOXAPARIN SODIUM 40 MG: 100 INJECTION SUBCUTANEOUS at 08:15

## 2024-06-30 RX ADMIN — METOPROLOL TARTRATE 25 MG: 25 TABLET, FILM COATED ORAL at 20:41

## 2024-06-30 RX ADMIN — Medication 10 ML: at 20:05

## 2024-06-30 RX ADMIN — ACETAMINOPHEN 650 MG: 325 TABLET ORAL at 20:40

## 2024-06-30 ASSESSMENT — PAIN DESCRIPTION - ONSET
ONSET: ON-GOING

## 2024-06-30 ASSESSMENT — PAIN - FUNCTIONAL ASSESSMENT
PAIN_FUNCTIONAL_ASSESSMENT: ACTIVITIES ARE NOT PREVENTED

## 2024-06-30 ASSESSMENT — PAIN DESCRIPTION - LOCATION
LOCATION: ARM
LOCATION: SHOULDER

## 2024-06-30 ASSESSMENT — PAIN DESCRIPTION - FREQUENCY
FREQUENCY: INTERMITTENT

## 2024-06-30 ASSESSMENT — PAIN SCALES - GENERAL
PAINLEVEL_OUTOF10: 0
PAINLEVEL_OUTOF10: 0
PAINLEVEL_OUTOF10: 6
PAINLEVEL_OUTOF10: 5
PAINLEVEL_OUTOF10: 0
PAINLEVEL_OUTOF10: 0
PAINLEVEL_OUTOF10: 5
PAINLEVEL_OUTOF10: 6
PAINLEVEL_OUTOF10: 0
PAINLEVEL_OUTOF10: 0

## 2024-06-30 ASSESSMENT — PAIN DESCRIPTION - ORIENTATION
ORIENTATION: LEFT

## 2024-06-30 ASSESSMENT — PAIN DESCRIPTION - PAIN TYPE
TYPE: CHRONIC PAIN
TYPE: ACUTE PAIN
TYPE: ACUTE PAIN

## 2024-06-30 ASSESSMENT — PAIN DESCRIPTION - DESCRIPTORS
DESCRIPTORS: ACHING
DESCRIPTORS: ACHING;SORE
DESCRIPTORS: ACHING
DESCRIPTORS: ACHING

## 2024-06-30 NOTE — FLOWSHEET NOTE
06/30/24 0700   Vital Signs   Temp 97.1 °F (36.2 °C)   Pulse 79   Heart Rate Source Monitor   Respirations 16   BP (!) 140/79   MAP (Calculated) 99   BP Location Left upper arm   BP Method Automatic   Patient Position Up in chair   Oxygen Therapy   SpO2 99 %   O2 Device Nasal cannula     Shift assessment completed, patient awake and alert sitting up in chair, morning medications given see MAR PRN Klonopin and Tylenol given per request. Fluids given per request. Call light within reach.

## 2024-07-01 ENCOUNTER — APPOINTMENT (OUTPATIENT)
Age: 74
DRG: 463 | End: 2024-07-01
Payer: COMMERCIAL

## 2024-07-01 VITALS
SYSTOLIC BLOOD PRESSURE: 165 MMHG | OXYGEN SATURATION: 97 % | WEIGHT: 119 LBS | HEIGHT: 60 IN | BODY MASS INDEX: 23.36 KG/M2 | HEART RATE: 71 BPM | DIASTOLIC BLOOD PRESSURE: 79 MMHG | TEMPERATURE: 98.1 F | RESPIRATION RATE: 17 BRPM

## 2024-07-01 LAB
ANION GAP SERPL CALCULATED.3IONS-SCNC: 13 MMOL/L (ref 3–16)
BASOPHILS # BLD: 0.07 K/UL (ref 0–0.2)
BASOPHILS NFR BLD: 1 %
BUN SERPL-MCNC: 15 MG/DL (ref 7–20)
CALCIUM SERPL-MCNC: 8.5 MG/DL (ref 8.3–10.6)
CHLORIDE SERPL-SCNC: 102 MMOL/L (ref 99–110)
CO2 SERPL-SCNC: 24 MMOL/L (ref 21–32)
CREAT SERPL-MCNC: 0.7 MG/DL (ref 0.6–1.2)
EKG DIAGNOSIS: NORMAL
EKG Q-T INTERVAL: 362 MS
EKG QRS DURATION: 78 MS
EKG QTC CALCULATION (BAZETT): 494 MS
EKG R AXIS: 25 DEGREES
EKG T AXIS: 7 DEGREES
EKG VENTRICULAR RATE: 112 BPM
EOSINOPHIL # BLD: 0.78 K/UL (ref 0–0.6)
EOSINOPHILS RELATIVE PERCENT: 10 %
ERYTHROCYTE [DISTWIDTH] IN BLOOD BY AUTOMATED COUNT: 13.3 % (ref 12.4–15.4)
GFR, ESTIMATED: 89 ML/MIN/1.73M2
GLUCOSE BLD-MCNC: 124 MG/DL (ref 70–99)
GLUCOSE BLD-MCNC: 126 MG/DL (ref 70–99)
GLUCOSE SERPL-MCNC: 225 MG/DL (ref 70–99)
HCT VFR BLD AUTO: 37.2 % (ref 36–48)
HGB BLD-MCNC: 11.8 G/DL (ref 12–16)
IMM GRANULOCYTES # BLD AUTO: 0.03 K/UL (ref 0–0.5)
IMM GRANULOCYTES NFR BLD: 0 %
LYMPHOCYTES NFR BLD: 1 K/UL (ref 1–5.1)
LYMPHOCYTES RELATIVE PERCENT: 13 %
MCH RBC QN AUTO: 28.3 PG (ref 26–34)
MCHC RBC AUTO-ENTMCNC: 31.7 G/DL (ref 31–36)
MCV RBC AUTO: 89.2 FL (ref 80–100)
MONOCYTES NFR BLD: 0.93 K/UL (ref 0–1.3)
MONOCYTES NFR BLD: 12 %
NEUTROPHILS NFR BLD: 64 %
NEUTS SEG NFR BLD: 5.01 K/UL (ref 1.7–7.7)
PLATELET # BLD AUTO: 359 K/UL (ref 135–450)
PMV BLD AUTO: 10 FL (ref 9.4–12.4)
POTASSIUM SERPL-SCNC: 3.9 MMOL/L (ref 3.5–5.1)
RBC # BLD AUTO: 4.17 M/UL (ref 4–5.2)
SODIUM SERPL-SCNC: 138 MMOL/L (ref 136–145)
WBC OTHER # BLD: 7.8 K/UL (ref 4–11)

## 2024-07-01 PROCEDURE — 80048 BASIC METABOLIC PNL TOTAL CA: CPT

## 2024-07-01 PROCEDURE — 2700000000 HC OXYGEN THERAPY PER DAY

## 2024-07-01 PROCEDURE — 36415 COLL VENOUS BLD VENIPUNCTURE: CPT

## 2024-07-01 PROCEDURE — 82962 GLUCOSE BLOOD TEST: CPT

## 2024-07-01 PROCEDURE — 94761 N-INVAS EAR/PLS OXIMETRY MLT: CPT

## 2024-07-01 PROCEDURE — 6360000002 HC RX W HCPCS: Performed by: INTERNAL MEDICINE

## 2024-07-01 PROCEDURE — 2580000003 HC RX 258: Performed by: INTERNAL MEDICINE

## 2024-07-01 PROCEDURE — 97535 SELF CARE MNGMENT TRAINING: CPT

## 2024-07-01 PROCEDURE — 94640 AIRWAY INHALATION TREATMENT: CPT

## 2024-07-01 PROCEDURE — 85025 COMPLETE CBC W/AUTO DIFF WBC: CPT

## 2024-07-01 PROCEDURE — 6370000000 HC RX 637 (ALT 250 FOR IP): Performed by: INTERNAL MEDICINE

## 2024-07-01 PROCEDURE — 6370000000 HC RX 637 (ALT 250 FOR IP): Performed by: NURSE PRACTITIONER

## 2024-07-01 PROCEDURE — 97530 THERAPEUTIC ACTIVITIES: CPT

## 2024-07-01 RX ORDER — CIPROFLOXACIN 500 MG/1
500 TABLET, FILM COATED ORAL EVERY 12 HOURS SCHEDULED
Qty: 7 TABLET | Refills: 0 | Status: SHIPPED | OUTPATIENT
Start: 2024-07-01 | End: 2024-07-05

## 2024-07-01 RX ADMIN — ACETAMINOPHEN 650 MG: 325 TABLET ORAL at 07:43

## 2024-07-01 RX ADMIN — ARFORMOTEROL TARTRATE 15 MCG: 15 SOLUTION RESPIRATORY (INHALATION) at 08:45

## 2024-07-01 RX ADMIN — GABAPENTIN 300 MG: 300 CAPSULE ORAL at 08:16

## 2024-07-01 RX ADMIN — BUDESONIDE INHALATION 500 MCG: 0.5 SUSPENSION RESPIRATORY (INHALATION) at 08:45

## 2024-07-01 RX ADMIN — Medication 10 ML: at 08:19

## 2024-07-01 RX ADMIN — LISINOPRIL 10 MG: 10 TABLET ORAL at 08:16

## 2024-07-01 RX ADMIN — ROPINIROLE HYDROCHLORIDE 0.25 MG: 0.25 TABLET, FILM COATED ORAL at 08:16

## 2024-07-01 RX ADMIN — LEVETIRACETAM 500 MG: 500 TABLET, FILM COATED ORAL at 08:16

## 2024-07-01 RX ADMIN — ENOXAPARIN SODIUM 40 MG: 100 INJECTION SUBCUTANEOUS at 08:16

## 2024-07-01 RX ADMIN — OXYCODONE 5 MG: 5 TABLET ORAL at 01:39

## 2024-07-01 RX ADMIN — BUPROPION HYDROCHLORIDE 150 MG: 150 TABLET, EXTENDED RELEASE ORAL at 08:16

## 2024-07-01 RX ADMIN — CLONAZEPAM 1 MG: 1 TABLET ORAL at 04:19

## 2024-07-01 RX ADMIN — TIOTROPIUM BROMIDE INHALATION SPRAY 2 PUFF: 3.12 SPRAY, METERED RESPIRATORY (INHALATION) at 08:45

## 2024-07-01 RX ADMIN — FERROUS SULFATE TAB 325 MG (65 MG ELEMENTAL FE) 325 MG: 325 (65 FE) TAB at 08:16

## 2024-07-01 RX ADMIN — OXYCODONE 5 MG: 5 TABLET ORAL at 10:58

## 2024-07-01 RX ADMIN — PANTOPRAZOLE SODIUM 40 MG: 40 TABLET, DELAYED RELEASE ORAL at 08:16

## 2024-07-01 RX ADMIN — METOPROLOL TARTRATE 25 MG: 25 TABLET, FILM COATED ORAL at 08:16

## 2024-07-01 RX ADMIN — ASPIRIN 81 MG: 81 TABLET, COATED ORAL at 08:16

## 2024-07-01 RX ADMIN — CIPROFLOXACIN 500 MG: 500 TABLET, FILM COATED ORAL at 08:16

## 2024-07-01 RX ADMIN — CLONAZEPAM 1 MG: 1 TABLET ORAL at 12:15

## 2024-07-01 ASSESSMENT — PAIN DESCRIPTION - ORIENTATION
ORIENTATION: LEFT

## 2024-07-01 ASSESSMENT — PAIN SCALES - GENERAL
PAINLEVEL_OUTOF10: 5
PAINLEVEL_OUTOF10: 5
PAINLEVEL_OUTOF10: 2
PAINLEVEL_OUTOF10: 2
PAINLEVEL_OUTOF10: 6
PAINLEVEL_OUTOF10: 7

## 2024-07-01 ASSESSMENT — PAIN DESCRIPTION - FREQUENCY
FREQUENCY: CONTINUOUS
FREQUENCY: CONTINUOUS
FREQUENCY: INTERMITTENT
FREQUENCY: CONTINUOUS

## 2024-07-01 ASSESSMENT — PAIN DESCRIPTION - DESCRIPTORS
DESCRIPTORS: ACHING
DESCRIPTORS: ACHING
DESCRIPTORS: SHARP
DESCRIPTORS: ACHING
DESCRIPTORS: ACHING;DISCOMFORT

## 2024-07-01 ASSESSMENT — PAIN DESCRIPTION - ONSET
ONSET: ON-GOING

## 2024-07-01 ASSESSMENT — PAIN DESCRIPTION - PAIN TYPE
TYPE: CHRONIC PAIN

## 2024-07-01 ASSESSMENT — PAIN - FUNCTIONAL ASSESSMENT
PAIN_FUNCTIONAL_ASSESSMENT: ACTIVITIES ARE NOT PREVENTED

## 2024-07-01 ASSESSMENT — PAIN DESCRIPTION - LOCATION
LOCATION: ARM
LOCATION: SHOULDER
LOCATION: SHOULDER
LOCATION: ARM
LOCATION: SHOULDER

## 2024-07-01 NOTE — PROGRESS NOTES
Hospitalist Progress Note      PCP: No primary care provider on file.    Date of Admission: 6/27/2024    Chief Complaint: Altered mental status    Hospital Course: Viji Gamez is a 74 y.o. female with pmh of  chronic depression, seizure disorder, essential hypertension, type 2 diabetes mellitus, hyperlipidemia, restless leg syndrome and COPD who presented to ED with a complaint of increasing confusion.  Patient was noted to be more confused and was initially complaining of dysuria.  Patient was initially seen at [facility] ER where a UA was done and showed no evidence of UTI.  Patient was discharged home.  Patient continued to be confused and daughter was concerned about the patient condition as she brought her to our ER for further evaluation.  In ER patient was found to have hypertensive urgency and a UTI.  She is admitted for further treatment.  See assessment and plan below.    Subjective: Patient up to chair.  Had worked with PT OT earlier and heart rate was noted to be 120.  Unclear if patient was symptomatic.  Patient calm and cooperative.  She is forgetful.  No family at bedside.  Denies chest pain palpitations headache when specifically asked.  Reviewed plan of care, she does not seem to understand fully.    Assessment/Plan:    Metabolic encephalopathy, resolved  -Secondary to UTI  -Continue with IV antibiotics  -Seems to be at baseline mentation  -MRI of brain showed no acute intracranial abnormality.  No acute infarction.  Small remote infarctions bilateral frontal lobe.  Remote infarction to left of midline.  Near complete opacification of left maxillary and right send point sinuses  -Neurology was consulted, encephalopathy likely combination of hypertensive urgency in addition to metabolic encephalopathy from UTI.  Does recommend to consider carotid Doppler at some point given the presence of remote stroke on MRI.  Recommend aspirin for stroke prevention.    UTI present on 
    V2.0    [facility] Progress Note      Name:  Viji Gamez /Age/Sex: 1950  (74 y.o. female)   MRN & CSN:  [id number] & [id number] Encounter Date/Time: 2024 3:59 PM EDT   Location:  [facility]- PCP: No primary care provider on file.     Attending:Ender Paez MD       Hospital Day: 2    Assessment and Recommendations   Viji Gaemz is a 74 y.o. female with pmh of  chronic depression, seizure disorder, essential hypertension, type 2 diabetes mellitus, hyperlipidemia, restless leg syndrome and COPD who presented to ED with a complaint of increasing confusion.  Patient was noted to be more confused and was initially complaining of dysuria.  Patient was initially seen at [facility] ER where a UA was done and showed no evidence of UTI.  Patient was discharged home.  Patient continued to be confused and daughter was concerned about the patient condition as she brought her to our ER for further evaluation.  In ED patient was found to have elevated blood pressure upon initial presentation with BP reading of 187/118 and heart rate of 130.  Patient has several complaints including worsening back pain and lower extremity pain along with nausea.  Patient is overall a poor historian due to underlying encephalopathy.  She cannot provide adequate or meaningful history at this time.  CT head was obtained in ED and showed no evidence of acute changes.  Patient is currently being admitted under inpatient status for close monitoring and further evaluation  who presents with Acute encephalopathy      Plan:   Acute encephalopathy, unclear etiology-patient underwent MRI right without any acute ischemic findings; patient remains confused UA checked-2+ bacteria 10-20 WBC; culture pending; started on Rocephin as patient has confusion; spoke with daughter-per family patient gets more confused with UTIs  Dementia without behavioral disturbance.    Hypertensive urgency-blood pressure is more stable SBP 
   06/28/24 1345   Encounter Summary   Encounter Overview/Reason Attempted Encounter   Service Provided For Patient not available   Referral/Consult From Rounding   Last Encounter  06/28/24  (Attempted to visit patient/spoke with patients nurse; states patient is sleeping and has also been agitated/CK)       
  [facility] - Rehabilitation Department      Physical Therapy  3216/3216-01  [facility] 3 PROGRESSIVE CARE  Hold Therapy patient with increased agitation per Rn and was administered Haldol. D/W Rn will re-attempt as able 6/29/24  Esmer Herrera, PT  Jojo Sequeira, OT                
4 Eyes Skin Assessment     NAME:  Viji Gamez  YOB: 1950  MEDICAL RECORD NUMBER:  [id number]    The patient is being assessed for  Admission    I agree that at least one RN has performed a thorough Head to Toe Skin Assessment on the patient. ALL assessment sites listed below have been assessed.      Areas assessed by both nurses:    Head, Face, Ears, Shoulders, Back, Chest, Arms, Elbows, Hands, Sacrum. Buttock, Coccyx, Ischium, and Legs. Feet and Heels        Does the Patient have a Wound? No noted wound(s)-skin red and rashy in areas       Reza Prevention initiated by RN: Yes  Wound Care Orders initiated by RN: No    Pressure Injury (Stage 3,4, Unstageable, DTI, NWPT, and Complex wounds) if present, place Wound referral order by RN under : No    New Ostomies, if present place, Ostomy referral order under : No     Nurse 1 eSignature: Electronically signed by Elba Benoit RN on 6/28/24 at 12:06 AM EDT    **SHARE this note so that the co-signing nurse can place an eSignature**    Nurse 2 eSignature: Electronically signed by PAULA TSAI RN on 6/28/24 at 12:09 AM EDT   
Occupational Therapy     [facility] - Rehabilitation Department       Occupational Therapy  3201/3201-01  [facility] 3 PROGRESSIVE CARE    [x] Initial Evaluation            [x] Daily Treatment Note         [] Discharge Summary      Patient: Viji Gamez   : 1950   MRN: [id number]   Date of Service:  2024    Admitting Diagnosis:  Acute encephalopathy  Referring Physician: Dr. Paez  Current Admission Summary: Viji Gamez is a 74 y.o. female who presents with chronic depression, seizure disorder, essential hypertension, type 2 diabetes mellitus, hyperlipidemia, restless leg syndrome and COPD who presented to ED with a complaint of increasing confusion.  Patient was noted to be more confused and was initially complaining of dysuria.  Patient was initially seen at [facility] ER where a UA was done and showed no evidence of UTI.  Patient was discharged home.  Patient continued to be confused and daughter was concerned about the patient condition as she brought her to our ER for further evaluation.  In ED patient was found to have elevated blood pressure upon initial presentation with BP reading of 187/118 and heart rate of 130.  Patient has several complaints including worsening back pain and lower extremity pain along with nausea.  Patient is overall a poor historian due to underlying encephalopathy.  She cannot provide adequate or meaningful history at this time.  CT head was obtained in ED and showed no evidence of acute changes.  Patient is currently being admitted under inpatient status for close monitoring and further evaluation..     Past Medical History:  has a past medical history of A-fib (HCC), COPD (chronic obstructive pulmonary disease) (HCC), Diabetes mellitus (HCC), H/O heart artery stent, Hypertension, and Presence of Watchman left atrial appendage closure device.  Past Surgical History:  has a past surgical history that includes Cataract extraction and back 
Patient educated on discharge instructions as well as new medications use, dosage, administration and possible side effects.  Patient verified knowledge. IV removed without difficulty and dry dressing in place. Telemetry monitor removed and returned to CMU. Pt left facility in stable condition to Home with all of their personal belongings and on 3L oxygen via nasal canula  
Per RN patient not appropriate for MRI at this time. Pt in restraints. RN to call when patient is more appropriate.  
Physical Therapy  Facility/Department: [facility] 3 PROGRESSIVE CARE  Physical Therapy Initial Assessment/Treatment    Name: Viji Gamez  : 1950  MRN: [id number]  Date of Service: 2024    Discharge Recommendations:  Subacute/Skilled Nursing Facility   PT Equipment Recommendations  Equipment Needed: No      Patient Diagnosis(es): The primary encounter diagnosis was Acute metabolic encephalopathy. A diagnosis of Atrial fibrillation with rapid ventricular response (HCC) was also pertinent to this visit.  Past Medical History:  has a past medical history of A-fib (HCC), COPD (chronic obstructive pulmonary disease) (HCC), Diabetes mellitus (HCC), H/O heart artery stent, Hypertension, and Presence of Watchman left atrial appendage closure device.  Past Surgical History:  has a past surgical history that includes Cataract extraction and back surgery.    Assessment   Body Structures, Functions, Activity Limitations Requiring Skilled Therapeutic Intervention: Decreased functional mobility ;Decreased endurance;Decreased balance;Decreased strength  Assessment: 74 y.o. female with pmh of chronic depression, seizure disorder, essential hypertension, type 2 diabetes mellitus, hyperlipidemia, restless leg syndrome and COPD who presented to ED with a complaint of increasing confusion. Patient was initially seen at [facility] ER where a UA was done and showed no evidence of UTI. Patient was discharged home. Patient continued to be confused and daughter was concerned about the patient condition as she brought her to our ER for further evaluation. In ED patient was found to have elevated blood pressure upon initial presentation with BP reading of 187/118 and heart rate of 130. Pt IND at home with PRN use of RW. Pt current requiring Ax1 for transfers and amb with RW. Pt with decreased strength, balance and endurance compared to baseline. Pt is below her baseline and would benefit from further skilled PT to 
Pt arrived to room 3216 at 2320. Pt oriented to room and call light system.   
Pt confused. MRI checklist questions answered by daughter Elba via phone.   
Pt noted to be hallucinating and talking to herself. Pt reoriented and 1:1 provided. Dr. Jean Claude hernández.   
Pt returned from MRI. Pt very sleepy, drowsy, responds to voice only and goes back to sleep. Tele reading afib 110s. O2 sats 100% on 2 L nc. Fall precautions in place, bed alarm in place. Pt incont of urine in brief, pt cleaned and brief changed, purewick applied. Bed low, call bell in reach, instructed to call for assist.   
Pt very agitated and confused, trying to leave bed. Pt medicated per CNP. MRI notified, will cont to monitor.   
500 mg Oral 2 times per day    sodium chloride  2 spray Each Nostril TID    aspirin  81 mg Oral Daily    buPROPion  150 mg Oral BID    donepezil  5 mg Oral Nightly    ferrous sulfate  325 mg Oral Daily with breakfast    insulin glargine  15 Units SubCUTAneous Nightly    levETIRAcetam  500 mg Oral BID    lisinopril  10 mg Oral Daily    metoprolol tartrate  25 mg Oral BID    pantoprazole  40 mg Oral Daily    rOPINIRole  0.25 mg Oral BID    rosuvastatin  40 mg Oral QPM    tiotropium  2 puff Inhalation Daily RT    sodium chloride flush  5-40 mL IntraVENous 2 times per day    enoxaparin  40 mg SubCUTAneous Daily    insulin lispro  0-8 Units SubCUTAneous TID WC    insulin lispro  0-4 Units SubCUTAneous Nightly    carbamide peroxide  5 drop Both Ears Once    gabapentin  600 mg Oral QPM    gabapentin  300 mg Oral QAM    arformoterol tartrate  15 mcg Nebulization BID RT    budesonide  0.5 mg Nebulization BID RT     PRN Meds: albuterol sulfate HFA, clonazePAM, glucose, dextrose bolus **OR** dextrose bolus, glucagon (rDNA), dextrose, sodium chloride flush, sodium chloride, potassium chloride **OR** potassium alternative oral replacement **OR** potassium chloride, magnesium sulfate, ondansetron **OR** ondansetron, polyethylene glycol, acetaminophen **OR** acetaminophen, butalbital-acetaminophen-caffeine, hydrALAZINE, perflutren lipid microspheres      Intake/Output Summary (Last 24 hours) at 6/30/2024 1147  Last data filed at 6/30/2024 1140  Gross per 24 hour   Intake --   Output 1900 ml   Net -1900 ml       Physical Exam Performed:    /88   Pulse 73   Temp 97 °F (36.1 °C) (Oral)   Resp 16   Ht 1.524 m (5')   Wt 54 kg (119 lb)   SpO2 99%   BMI 23.24 kg/m²     General appearance: No apparent distress, appears stated age and cooperative.  HEENT: Pupils equal, round. Conjunctivae/corneas clear.  Neck: Supple, with full range of motion. No jugular venous distention. Trachea midline.  Respiratory:  Normal respiratory 
use of rolling walker.  She then was able to ambulate to sink with rw and min assist where she felt very fatigued and requested chair.  Patient then set up for breakfast in chair   Instrumental Activities of Daily Living  No IADL completed on this date.    Functional Mobility  Bed Mobility:  Supine to Sit: stand by assistance, contact guard assistance  Comments:  Transfers:  Sit to stand transfer:contact guard assistance  Stand to sit transfer: contact guard assistance  Toilet transfer: contact guard assistance  Comments:rw  Functional Mobility  Functional Mobility Activity: to/from bathroom  Device Use: rolling walker  Required Assistance: minimal assistance  Comment: 20 feet and 10 feet, rw, fatigued   Balance:  Static Sitting Balance: fair (+): maintains balance at SBA/supervision without use of UE support  Dynamic Sitting Balance: fair (+): maintains balance at SBA/supervision without use of UE support  Static Standing Balance: fair (+): maintains balance at SBA/supervision without use of UE support  Dynamic Standing Balance: fair (-): maintains balance at CGA with use of UE support  Comments:      Other Therapeutic Interventions:      __________________________________________________________________________________     Education  Barriers To Learning: physical  Patient Education: patient educated on goals, OT role and benefits, plan of care, precautions, ADL adaptive strategies  Learning Assessment:  patient verbalizes understanding, would benefit from continued reinforcement        Patient Disposition at end of session:  patient left in chair, chair alarm in place, and call light within reach    Plan  Frequency: 2-5x/per week  Current Treatment Recommendations: balance training, functional mobility training, transfer training, endurance training, and ADL/self-care training    Goals  Patient Goals: Patient would like to return home with assistance as needed   Short Term Goals:  Time Frame: By discharge unless

## 2024-07-01 NOTE — CARE COORDINATION
DISCHARGE ORDER  Date/Time 2024 12:10 PM  Completed by: Emily Coto, Case Management    Patient Name: Viji Gamez      : 1950  Admitting Diagnosis: Acute encephalopathy [G93.40]      Admit order Date and Status:-24  (verify MD's last order for status of admission)      Noted discharge order.   If applicable PT/OT recommendation at Discharge: C with 24 hr assist   DME recommendation by PT/OT:met  Confirmed discharge plan  (): Yes  with whom patient _______________  If pt confirmed DC plan does family need to be contacted by CM No if yes who______  Discharge Plan: Pt to [facility] home with [facility] (PT/OT/SN)    Date of Last IMM Given: na    Reviewed chart.  Role of discharge planner explained and patient verbalized understanding. Discharge order is noted.    Has Home O2 in place on admit:  Yes  Informed of need to bring portable home O2 tank on day of discharge for nursing to connect prior to leaving:   Yes  Verbalized agreement/Understanding:   Yes  Pt is being d/c'd to home today. Pt's O2 sats are 97% on 3L.    Discharge timeout done with CM/Pt/RN. All discharge needs and concerns addressed.

## 2024-07-01 NOTE — DISCHARGE SUMMARY
Hospital Medicine Discharge Summary    Patient ID: Viji Gamez      Patient's PCP: No primary care provider on file.    Admit Date: 6/27/2024     Discharge Date: 7/1/2024 07/01/2024    Admitting Physician: Andrei Silverio MD     Discharge Physician: MONTSERRAT Cali - CNP     Discharge Diagnoses  Metabolic encephalopathy, resolved   UTI present on admission, stable   Hyponatremia   Bilateral sinusitis   Generalized weakness   Hypertensive urgency, resolved  Dementia  Type 2 diabetes with hyperlipidemia  Restless leg syndrome  COPD without exacerbation  History of CVA      Hospital Course: Viji Gamez is a 74 y.o. female with pmh of chronic depression, seizure disorder, essential hypertension, type 2 diabetes mellitus, hyperlipidemia, restless leg syndrome and COPD who presented to ED with a complaint of increasing confusion. Patient was noted to be more confused and was initially complaining of dysuria. Patient was initially seen at [facility] ER where a UA was done and showed no evidence of UTI. Patient was discharged home. Patient continued to be confused and daughter was concerned about the patient condition as she brought her to our ER for further evaluation. In ER patient was found to have hypertensive urgency and a UTI. She was admitted for further treatment. See assessment and plan below.     Metabolic encephalopathy, resolved  -Secondary to UTI  -Seems to be at baseline mentation  -MRI of brain showed no acute intracranial abnormality.  No acute infarction.  Small remote infarctions bilateral frontal lobe.  Remote infarction to left of midline.  Near complete opacification of left maxillary and right send point sinuses  -Neurology was consulted, encephalopathy likely combination of hypertensive urgency in addition to metabolic encephalopathy from UTI.  Does recommend to consider carotid Doppler at some point given the presence of remote stroke on MRI.  Recommend aspirin

## 2024-07-01 NOTE — DISCHARGE INSTR - COC
Continuity of Care Form    Patient Name: Viji Gamez   :  1950  MRN:  [id number]    Admit date:  2024  Discharge date:  24    Code Status Order: Full Code   Advance Directives:     Admitting Physician:  Andrei Silverio MD  PCP: No primary care provider on file.    Discharging Nurse: Latoya VIRAMONTES  Discharging Hospital Unit/Room#: 3201/3201-01  Discharging Unit Phone Number: [phone]    Emergency Contact:   Extended Emergency Contact Information  Primary Emergency Contact: Elba Greene  Home Phone: [phone]  Relation: Child  Secondary Emergency Contact: Jessa Austin  Home Phone: [phone]  Relation: Brother/Sister    Past Surgical History:  Past Surgical History:   Procedure Laterality Date    BACK SURGERY      CATARACT EXTRACTION         Immunization History:     There is no immunization history on file for this patient.    Active Problems:  Patient Active Problem List   Diagnosis Code    Sepsis (HCC) A41.9    Hyponatremia E87.1    Acute encephalopathy G93.40       Isolation/Infection:   Isolation            No Isolation          Patient Infection Status       None to display                     Nurse Assessment:  Last Vital Signs: BP (!) 165/79   Pulse 71   Temp 98.1 °F (36.7 °C) (Oral)   Resp 18   Ht 1.524 m (5')   Wt 54 kg (119 lb)   SpO2 97%   BMI 23.24 kg/m²     Last documented pain score (0-10 scale): Pain Level: 7  Last Weight:   Wt Readings from Last 1 Encounters:   24 54 kg (119 lb)     Mental Status:  oriented and alert    IV Access:  - None    Nursing Mobility/ADLs:  Walking   Independent  Transfer  Independent  Bathing  Assisted  Dressing  Assisted  Toileting  Assisted  Feeding  Independent  Med Admin  Independent  Med Delivery   whole    Wound Care Documentation and Therapy:        Elimination:  Continence:   Bowel: No  Bladder: No  Urinary Catheter: None   Colostomy/Ileostomy/Ileal Conduit: No       Date of Last BM:     Intake/Output Summary (Last

## 2024-07-01 NOTE — FLOWSHEET NOTE
07/01/24 0742   Vital Signs   Temp 97.9 °F (36.6 °C)   Temp Source Oral   Pulse 63   Heart Rate Source Monitor   Respirations 17   BP (!) 137/94   MAP (Calculated) 108   BP Location Left upper arm   BP Method Automatic   Patient Position Semi fowlers   Oxygen Therapy   SpO2 95 %   O2 Device Nasal cannula   O2 Flow Rate (L/min) 3 L/min     Shift assessment completed, patient awake and alert sitting up in bed. Morning medications given, PRN tylenol given per request see MAR. Fluids given per request. Patient denies any further concerns at this time. Call light within reach.

## 2024-07-01 NOTE — PLAN OF CARE
Problem: Discharge Planning  Goal: Discharge to home or other facility with appropriate resources  6/29/2024 1025 by Latoya Estrada RN  Outcome: Progressing  Flowsheets (Taken 6/29/2024 1025)  Discharge to home or other facility with appropriate resources:   Identify barriers to discharge with patient and caregiver   Identify discharge learning needs (meds, wound care, etc)   Arrange for needed discharge resources and transportation as appropriate  6/28/2024 2350 by Shiloh Esparza RN  Outcome: Progressing     Problem: Pain  Goal: Verbalizes/displays adequate comfort level or baseline comfort level  6/29/2024 1025 by Latoya Estrada RN  Outcome: Progressing  Flowsheets (Taken 6/29/2024 1025)  Verbalizes/displays adequate comfort level or baseline comfort level:   Encourage patient to monitor pain and request assistance   Administer analgesics based on type and severity of pain and evaluate response   Assess pain using appropriate pain scale   Implement non-pharmacological measures as appropriate and evaluate response  6/28/2024 2350 by Shiloh Esparza RN  Outcome: Progressing     Problem: Skin/Tissue Integrity  Goal: Absence of new skin breakdown  Description: 1.  Monitor for areas of redness and/or skin breakdown  2.  Assess vascular access sites hourly  3.  Every 4-6 hours minimum:  Change oxygen saturation probe site  4.  Every 4-6 hours:  If on nasal continuous positive airway pressure, respiratory therapy assess nares and determine need for appliance change or resting period.  6/29/2024 1025 by Latoya Estrada RN  Outcome: Progressing  6/28/2024 2350 by Shiloh Esparza RN  Outcome: Progressing     Problem: Safety - Adult  Goal: Free from fall injury  6/29/2024 1025 by Latoya Estrada RN  Outcome: Progressing  6/28/2024 2350 by Shiloh Esparza RN  Outcome: Progressing     Problem: ABCDS Injury Assessment  Goal: Absence of physical injury  6/29/2024 1025 by Latoya Estrada RN  Outcome: 
  Problem: Discharge Planning  Goal: Discharge to home or other facility with appropriate resources  6/29/2024 2345 by Abigail Sal RN  Outcome: Progressing  6/29/2024 1025 by Latoya Estrada RN  Outcome: Progressing  Flowsheets (Taken 6/29/2024 1025)  Discharge to home or other facility with appropriate resources:   Identify barriers to discharge with patient and caregiver   Identify discharge learning needs (meds, wound care, etc)   Arrange for needed discharge resources and transportation as appropriate     Problem: Safety - Adult  Goal: Free from fall injury  6/29/2024 2345 by Abigail Sal RN  Outcome: Progressing  6/29/2024 1025 by Latoya Estrada RN  Outcome: Progressing     Problem: ABCDS Injury Assessment  Goal: Absence of physical injury  6/29/2024 2345 by Abigail Sal RN  Outcome: Progressing  6/29/2024 1025 by Latoya Estrada RN  Outcome: Progressing     Problem: Confusion  Goal: Confusion, delirium, dementia, or psychosis is improved or at baseline  Description: INTERVENTIONS:  1. Assess for possible contributors to thought disturbance, including medications, impaired vision or hearing, underlying metabolic abnormalities, dehydration, psychiatric diagnoses, and notify attending LIP  2. [place] high risk fall precautions, as indicated  3. Provide frequent short contacts to provide reality reorientation, refocusing and direction  4. Decrease environmental stimuli, including noise as appropriate  5. Monitor and intervene to maintain adequate nutrition, hydration, elimination, sleep and activity  6. If unable to ensure safety without constant attention obtain sitter and review sitter guidelines with assigned personnel  7. Initiate Psychosocial CNS and Spiritual Care consult, as indicated  6/29/2024 2345 by Abigail Sal RN  Outcome: Progressing  6/29/2024 1025 by Latoya Estrada RN  Outcome: Progressing     Problem: Chronic Conditions and Co-morbidities  Goal: Patient's chronic conditions 
  Problem: Discharge Planning  Goal: Discharge to home or other facility with appropriate resources  7/1/2024 1209 by Latoya Estrada RN  Outcome: Adequate for Discharge  7/1/2024 0932 by Latoya Estrada RN  Outcome: Progressing  Flowsheets (Taken 7/1/2024 0932)  Discharge to home or other facility with appropriate resources:   Identify barriers to discharge with patient and caregiver   Identify discharge learning needs (meds, wound care, etc)   Arrange for needed discharge resources and transportation as appropriate  7/1/2024 0006 by Abigail Sal RN  Outcome: Progressing     Problem: Pain  Goal: Verbalizes/displays adequate comfort level or baseline comfort level  7/1/2024 1209 by Latoya Estrada RN  Outcome: Adequate for Discharge  7/1/2024 0932 by Latoya Estrada RN  Outcome: Progressing  Flowsheets (Taken 7/1/2024 0932)  Verbalizes/displays adequate comfort level or baseline comfort level:   Encourage patient to monitor pain and request assistance   Administer analgesics based on type and severity of pain and evaluate response   Assess pain using appropriate pain scale   Implement non-pharmacological measures as appropriate and evaluate response  7/1/2024 0006 by Abigail Sal RN  Outcome: Progressing     Problem: Skin/Tissue Integrity  Goal: Absence of new skin breakdown  Description: 1.  Monitor for areas of redness and/or skin breakdown  2.  Assess vascular access sites hourly  3.  Every 4-6 hours minimum:  Change oxygen saturation probe site  4.  Every 4-6 hours:  If on nasal continuous positive airway pressure, respiratory therapy assess nares and determine need for appliance change or resting period.  7/1/2024 1209 by Latoya Estrada RN  Outcome: Adequate for Discharge  7/1/2024 0932 by Latoya Estrada RN  Outcome: Progressing  7/1/2024 0006 by Abigail Sal RN  Outcome: Progressing     Problem: Safety - Adult  Goal: Free from fall injury  7/1/2024 1209 by Latoya Estrada RN  Outcome: 
  Problem: Discharge Planning  Goal: Discharge to home or other facility with appropriate resources  Outcome: Progressing     Problem: Pain  Goal: Verbalizes/displays adequate comfort level or baseline comfort level  Outcome: Progressing     Problem: Skin/Tissue Integrity  Goal: Absence of new skin breakdown  Description: 1.  Monitor for areas of redness and/or skin breakdown  2.  Assess vascular access sites hourly  3.  Every 4-6 hours minimum:  Change oxygen saturation probe site  4.  Every 4-6 hours:  If on nasal continuous positive airway pressure, respiratory therapy assess nares and determine need for appliance change or resting period.  Outcome: Progressing     Problem: Safety - Adult  Goal: Free from fall injury  Outcome: Progressing     Problem: ABCDS Injury Assessment  Goal: Absence of physical injury  Outcome: Progressing     
  Problem: Discharge Planning  Goal: Discharge to home or other facility with appropriate resources  Outcome: Progressing     Problem: Pain  Goal: Verbalizes/displays adequate comfort level or baseline comfort level  Outcome: Progressing     Problem: Skin/Tissue Integrity  Goal: Absence of new skin breakdown  Description: 1.  Monitor for areas of redness and/or skin breakdown  2.  Assess vascular access sites hourly  3.  Every 4-6 hours minimum:  Change oxygen saturation probe site  4.  Every 4-6 hours:  If on nasal continuous positive airway pressure, respiratory therapy assess nares and determine need for appliance change or resting period.  Outcome: Progressing     Problem: Safety - Adult  Goal: Free from fall injury  Outcome: Progressing     Problem: ABCDS Injury Assessment  Goal: Absence of physical injury  Outcome: Progressing     Problem: Confusion  Goal: Confusion, delirium, dementia, or psychosis is improved or at baseline  Description: INTERVENTIONS:  1. Assess for possible contributors to thought disturbance, including medications, impaired vision or hearing, underlying metabolic abnormalities, dehydration, psychiatric diagnoses, and notify attending LIP  2. [place] high risk fall precautions, as indicated  3. Provide frequent short contacts to provide reality reorientation, refocusing and direction  4. Decrease environmental stimuli, including noise as appropriate  5. Monitor and intervene to maintain adequate nutrition, hydration, elimination, sleep and activity  6. If unable to ensure safety without constant attention obtain sitter and review sitter guidelines with assigned personnel  7. Initiate Psychosocial CNS and Spiritual Care consult, as indicated  Outcome: Progressing     Problem: Chronic Conditions and Co-morbidities  Goal: Patient's chronic conditions and co-morbidity symptoms are monitored and maintained or improved  Outcome: Progressing     
  Problem: Discharge Planning  Goal: Discharge to home or other facility with appropriate resources  Outcome: Progressing     Problem: Pain  Goal: Verbalizes/displays adequate comfort level or baseline comfort level  Outcome: Progressing     Problem: Skin/Tissue Integrity  Goal: Absence of new skin breakdown  Description: 1.  Monitor for areas of redness and/or skin breakdown  2.  Assess vascular access sites hourly  3.  Every 4-6 hours minimum:  Change oxygen saturation probe site  4.  Every 4-6 hours:  If on nasal continuous positive airway pressure, respiratory therapy assess nares and determine need for appliance change or resting period.  Outcome: Progressing     Problem: Safety - Adult  Goal: Free from fall injury  Outcome: Progressing     Problem: ABCDS Injury Assessment  Goal: Absence of physical injury  Outcome: Progressing     Problem: Confusion  Goal: Confusion, delirium, dementia, or psychosis is improved or at baseline  Description: INTERVENTIONS:  1. Assess for possible contributors to thought disturbance, including medications, impaired vision or hearing, underlying metabolic abnormalities, dehydration, psychiatric diagnoses, and notify attending LIP  2. [place] high risk fall precautions, as indicated  3. Provide frequent short contacts to provide reality reorientation, refocusing and direction  4. Decrease environmental stimuli, including noise as appropriate  5. Monitor and intervene to maintain adequate nutrition, hydration, elimination, sleep and activity  6. If unable to ensure safety without constant attention obtain sitter and review sitter guidelines with assigned personnel  7. Initiate Psychosocial CNS and Spiritual Care consult, as indicated  Outcome: Progressing     Problem: Chronic Conditions and Co-morbidities  Goal: Patient's chronic conditions and co-morbidity symptoms are monitored and maintained or improved  Outcome: Progressing     
Progressing  6/29/2024 2345 by Abigail Sal RN  Outcome: Progressing     Problem: Confusion  Goal: Confusion, delirium, dementia, or psychosis is improved or at baseline  Description: INTERVENTIONS:  1. Assess for possible contributors to thought disturbance, including medications, impaired vision or hearing, underlying metabolic abnormalities, dehydration, psychiatric diagnoses, and notify attending LIP  2. [place] high risk fall precautions, as indicated  3. Provide frequent short contacts to provide reality reorientation, refocusing and direction  4. Decrease environmental stimuli, including noise as appropriate  5. Monitor and intervene to maintain adequate nutrition, hydration, elimination, sleep and activity  6. If unable to ensure safety without constant attention obtain sitter and review sitter guidelines with assigned personnel  7. Initiate Psychosocial CNS and Spiritual Care consult, as indicated  6/30/2024 0848 by Latoya Estrada, RN  Outcome: Progressing  6/29/2024 2345 by Abigail Sal, RN  Outcome: Progressing     Problem: Chronic Conditions and Co-morbidities  Goal: Patient's chronic conditions and co-morbidity symptoms are monitored and maintained or improved  6/30/2024 0848 by Latoya Estrada RN  Outcome: Progressing  6/29/2024 2345 by Abigail Sal RN  Outcome: Progressing     
Patient's chronic conditions and co-morbidity symptoms are monitored and maintained or improved  7/1/2024 0932 by Latoya Estrada, RN  Outcome: Progressing  7/1/2024 0006 by Abigail Sal, RN  Outcome: Progressing

## 2025-05-24 ENCOUNTER — HOSPITAL ENCOUNTER (INPATIENT)
Age: 75
LOS: 2 days | Discharge: HOME HEALTH | DRG: 199 | End: 2025-05-26
Attending: EMERGENCY MEDICINE | Admitting: HOSPITALIST
Payer: COMMERCIAL

## 2025-05-24 ENCOUNTER — APPOINTMENT (OUTPATIENT)
Age: 75
DRG: 199 | End: 2025-05-24
Payer: COMMERCIAL

## 2025-05-24 DIAGNOSIS — R03.0 ELEVATED BLOOD PRESSURE READING: ICD-10-CM

## 2025-05-24 DIAGNOSIS — E87.1 HYPONATREMIA: Primary | ICD-10-CM

## 2025-05-24 DIAGNOSIS — R41.82 ALTERED MENTAL STATUS, UNSPECIFIED ALTERED MENTAL STATUS TYPE: ICD-10-CM

## 2025-05-24 DIAGNOSIS — R51.9 ACUTE NONINTRACTABLE HEADACHE, UNSPECIFIED HEADACHE TYPE: ICD-10-CM

## 2025-05-24 DIAGNOSIS — I63.9 CEREBROVASCULAR ACCIDENT (CVA), UNSPECIFIED MECHANISM (HCC): ICD-10-CM

## 2025-05-24 LAB
ALBUMIN SERPL-MCNC: 4.9 G/DL (ref 3.4–5)
ALBUMIN/GLOB SERPL: 1.3 {RATIO}
ALP SERPL-CCNC: 148 U/L (ref 40–129)
ALT SERPL-CCNC: 15 U/L (ref 10–40)
AMORPH SED URNS QL MICRO: PRESENT
ANION GAP SERPL CALCULATED.3IONS-SCNC: 17 MMOL/L (ref 3–16)
AST SERPL-CCNC: 36 U/L (ref 15–37)
BASOPHILS # BLD: 0.04 K/UL (ref 0–0.2)
BASOPHILS NFR BLD: 1 %
BILIRUB SERPL-MCNC: 0.4 MG/DL (ref 0–1)
BILIRUB UR QL STRIP: NEGATIVE
BUN SERPL-MCNC: 8 MG/DL (ref 7–20)
CALCIUM SERPL-MCNC: 10.1 MG/DL (ref 8.3–10.6)
CHARACTER UR: ABNORMAL
CHLORIDE SERPL-SCNC: 87 MMOL/L (ref 99–110)
CLARITY UR: CLEAR
CO2 SERPL-SCNC: 24 MMOL/L (ref 21–32)
COHGB MFR BLD: 1.9 % (ref 0–1.5)
COLOR UR: YELLOW
CREAT SERPL-MCNC: 0.6 MG/DL (ref 0.6–1.2)
EOSINOPHIL # BLD: 0.2 K/UL (ref 0–0.6)
EOSINOPHILS RELATIVE PERCENT: 3 %
EPI CELLS #/AREA URNS HPF: ABNORMAL /HPF
ERYTHROCYTE [DISTWIDTH] IN BLOOD BY AUTOMATED COUNT: 12.9 % (ref 12.4–15.4)
GFR, ESTIMATED: >90 ML/MIN/1.73M2
GLUCOSE BLD-MCNC: 135 MG/DL (ref 70–99)
GLUCOSE BLD-MCNC: 199 MG/DL (ref 70–99)
GLUCOSE SERPL-MCNC: 148 MG/DL (ref 70–99)
GLUCOSE UR STRIP-MCNC: NEGATIVE MG/DL
HCO3 VENOUS: 27.3 MMOL/L (ref 23–29)
HCT VFR BLD AUTO: 47.9 % (ref 36–48)
HGB BLD-MCNC: 16.3 G/DL (ref 12–16)
HGB UR QL STRIP.AUTO: NEGATIVE
IMM GRANULOCYTES # BLD AUTO: 0.08 K/UL (ref 0–0.5)
IMM GRANULOCYTES NFR BLD: 1 %
INR PPP: 0.9 (ref 0.9–1.2)
KETONES UR STRIP-MCNC: ABNORMAL MG/DL
LACTATE BLDV-SCNC: 1.1 MMOL/L (ref 0.4–1.9)
LACTATE BLDV-SCNC: 1.4 MMOL/L (ref 0.4–1.9)
LEUKOCYTE ESTERASE UR QL STRIP: NEGATIVE
LYMPHOCYTES NFR BLD: 0.59 K/UL (ref 1–5.1)
LYMPHOCYTES RELATIVE PERCENT: 7 %
MCH RBC QN AUTO: 27.7 PG (ref 26–34)
MCHC RBC AUTO-ENTMCNC: 34 G/DL (ref 31–36)
MCV RBC AUTO: 81.5 FL (ref 80–100)
METHEMOGLOBIN: 0.4 % (ref 0–1.4)
MONOCYTES NFR BLD: 0.56 K/UL (ref 0–1.3)
MONOCYTES NFR BLD: 7 %
NEUTROPHILS NFR BLD: 82 %
NEUTS SEG NFR BLD: 6.54 K/UL (ref 1.7–7.7)
NITRITE UR QL STRIP: NEGATIVE
O2 SAT, VEN: 78.6 %
PCO2 VENOUS: 43.2 MM HG (ref 40–50)
PH UR STRIP: 7 [PH] (ref 5–8)
PH VENOUS: 7.42 (ref 7.35–7.45)
PLATELET # BLD AUTO: 381 K/UL (ref 135–450)
PMV BLD AUTO: 9.7 FL
PO2 VENOUS: 42.4 MM HG
POSITIVE BASE EXCESS, VEN: 2.3 MMOL/L (ref 0–3)
POTASSIUM SERPL-SCNC: 3.8 MMOL/L (ref 3.5–5.1)
POTASSIUM SERPL-SCNC: ABNORMAL MMOL/L (ref 3.5–5.1)
PROT SERPL-MCNC: 8.8 G/DL (ref 6.4–8.2)
PROT UR STRIP-MCNC: 100 MG/DL
PROTHROMBIN TIME: 12.1 SEC (ref 11.9–14.9)
RBC # BLD AUTO: 5.88 M/UL (ref 4–5.2)
RBC #/AREA URNS HPF: ABNORMAL /HPF
SODIUM SERPL-SCNC: 128 MMOL/L (ref 136–145)
SP GR UR STRIP: 1.01 (ref 1–1.03)
TROPONIN I SERPL HS-MCNC: 9 NG/L (ref 0–14)
UROBILINOGEN UR STRIP-ACNC: 0.2 EU/DL (ref 0–1)
WBC #/AREA URNS HPF: ABNORMAL /HPF
WBC OTHER # BLD: 8 K/UL (ref 4–11)

## 2025-05-24 PROCEDURE — 2060000000 HC ICU INTERMEDIATE R&B

## 2025-05-24 PROCEDURE — 2580000003 HC RX 258: Performed by: HOSPITALIST

## 2025-05-24 PROCEDURE — 93005 ELECTROCARDIOGRAM TRACING: CPT

## 2025-05-24 PROCEDURE — 80053 COMPREHEN METABOLIC PANEL: CPT

## 2025-05-24 PROCEDURE — 84132 ASSAY OF SERUM POTASSIUM: CPT

## 2025-05-24 PROCEDURE — 82962 GLUCOSE BLOOD TEST: CPT

## 2025-05-24 PROCEDURE — 36415 COLL VENOUS BLD VENIPUNCTURE: CPT

## 2025-05-24 PROCEDURE — 82805 BLOOD GASES W/O2 SATURATION: CPT

## 2025-05-24 PROCEDURE — 2500000003 HC RX 250 WO HCPCS: Performed by: HOSPITALIST

## 2025-05-24 PROCEDURE — 85610 PROTHROMBIN TIME: CPT

## 2025-05-24 PROCEDURE — 70498 CT ANGIOGRAPHY NECK: CPT

## 2025-05-24 PROCEDURE — 6370000000 HC RX 637 (ALT 250 FOR IP): Performed by: HOSPITALIST

## 2025-05-24 PROCEDURE — 94640 AIRWAY INHALATION TREATMENT: CPT

## 2025-05-24 PROCEDURE — 6370000000 HC RX 637 (ALT 250 FOR IP): Performed by: EMERGENCY MEDICINE

## 2025-05-24 PROCEDURE — 99285 EMERGENCY DEPT VISIT HI MDM: CPT

## 2025-05-24 PROCEDURE — 85025 COMPLETE CBC W/AUTO DIFF WBC: CPT

## 2025-05-24 PROCEDURE — 6360000004 HC RX CONTRAST MEDICATION: Performed by: EMERGENCY MEDICINE

## 2025-05-24 PROCEDURE — 6360000002 HC RX W HCPCS: Performed by: HOSPITALIST

## 2025-05-24 PROCEDURE — 80177 DRUG SCRN QUAN LEVETIRACETAM: CPT

## 2025-05-24 PROCEDURE — 84484 ASSAY OF TROPONIN QUANT: CPT

## 2025-05-24 PROCEDURE — 71045 X-RAY EXAM CHEST 1 VIEW: CPT

## 2025-05-24 PROCEDURE — 83605 ASSAY OF LACTIC ACID: CPT

## 2025-05-24 PROCEDURE — 73502 X-RAY EXAM HIP UNI 2-3 VIEWS: CPT

## 2025-05-24 PROCEDURE — 2580000003 HC RX 258: Performed by: EMERGENCY MEDICINE

## 2025-05-24 PROCEDURE — 81001 URINALYSIS AUTO W/SCOPE: CPT

## 2025-05-24 PROCEDURE — 70450 CT HEAD/BRAIN W/O DYE: CPT

## 2025-05-24 RX ORDER — GABAPENTIN 300 MG/1
300 CAPSULE ORAL EVERY MORNING
Status: DISCONTINUED | OUTPATIENT
Start: 2025-05-25 | End: 2025-05-26 | Stop reason: HOSPADM

## 2025-05-24 RX ORDER — FERROUS SULFATE 325(65) MG
325 TABLET ORAL
Status: DISCONTINUED | OUTPATIENT
Start: 2025-05-25 | End: 2025-05-26 | Stop reason: HOSPADM

## 2025-05-24 RX ORDER — POLYETHYLENE GLYCOL 3350 17 G/17G
17 POWDER, FOR SOLUTION ORAL DAILY PRN
Status: DISCONTINUED | OUTPATIENT
Start: 2025-05-24 | End: 2025-05-26 | Stop reason: HOSPADM

## 2025-05-24 RX ORDER — SODIUM CHLORIDE 9 MG/ML
INJECTION, SOLUTION INTRAVENOUS CONTINUOUS
Status: DISCONTINUED | OUTPATIENT
Start: 2025-05-24 | End: 2025-05-24

## 2025-05-24 RX ORDER — ONDANSETRON 2 MG/ML
4 INJECTION INTRAMUSCULAR; INTRAVENOUS EVERY 6 HOURS PRN
Status: DISCONTINUED | OUTPATIENT
Start: 2025-05-24 | End: 2025-05-26 | Stop reason: HOSPADM

## 2025-05-24 RX ORDER — ERGOCALCIFEROL 1.25 MG/1
50000 CAPSULE, LIQUID FILLED ORAL WEEKLY
Status: DISCONTINUED | OUTPATIENT
Start: 2025-05-25 | End: 2025-05-26 | Stop reason: HOSPADM

## 2025-05-24 RX ORDER — SODIUM CHLORIDE 9 MG/ML
INJECTION, SOLUTION INTRAVENOUS PRN
Status: DISCONTINUED | OUTPATIENT
Start: 2025-05-24 | End: 2025-05-26 | Stop reason: HOSPADM

## 2025-05-24 RX ORDER — ACETAMINOPHEN 325 MG/1
650 TABLET ORAL EVERY 6 HOURS PRN
Status: DISCONTINUED | OUTPATIENT
Start: 2025-05-24 | End: 2025-05-26 | Stop reason: HOSPADM

## 2025-05-24 RX ORDER — HYDRALAZINE HYDROCHLORIDE 20 MG/ML
10 INJECTION INTRAMUSCULAR; INTRAVENOUS EVERY 4 HOURS PRN
Status: DISCONTINUED | OUTPATIENT
Start: 2025-05-24 | End: 2025-05-26 | Stop reason: HOSPADM

## 2025-05-24 RX ORDER — SODIUM CHLORIDE 0.9 % (FLUSH) 0.9 %
10 SYRINGE (ML) INJECTION PRN
Status: DISCONTINUED | OUTPATIENT
Start: 2025-05-24 | End: 2025-05-26 | Stop reason: HOSPADM

## 2025-05-24 RX ORDER — ASPIRIN 81 MG/1
81 TABLET ORAL DAILY
Status: DISCONTINUED | OUTPATIENT
Start: 2025-05-25 | End: 2025-05-26 | Stop reason: HOSPADM

## 2025-05-24 RX ORDER — LISINOPRIL 10 MG/1
10 TABLET ORAL DAILY
Status: DISCONTINUED | OUTPATIENT
Start: 2025-05-24 | End: 2025-05-26 | Stop reason: HOSPADM

## 2025-05-24 RX ORDER — LEVETIRACETAM 500 MG/1
500 TABLET ORAL 2 TIMES DAILY
Status: DISCONTINUED | OUTPATIENT
Start: 2025-05-24 | End: 2025-05-26 | Stop reason: HOSPADM

## 2025-05-24 RX ORDER — ASPIRIN 81 MG/1
324 TABLET, CHEWABLE ORAL ONCE
Status: DISCONTINUED | OUTPATIENT
Start: 2025-05-24 | End: 2025-05-24

## 2025-05-24 RX ORDER — BUTALBITAL, ACETAMINOPHEN AND CAFFEINE 50; 325; 40 MG/1; MG/1; MG/1
1 TABLET ORAL ONCE
Status: COMPLETED | OUTPATIENT
Start: 2025-05-24 | End: 2025-05-24

## 2025-05-24 RX ORDER — POTASSIUM CHLORIDE 7.45 MG/ML
10 INJECTION INTRAVENOUS PRN
Status: DISCONTINUED | OUTPATIENT
Start: 2025-05-24 | End: 2025-05-26 | Stop reason: HOSPADM

## 2025-05-24 RX ORDER — BUTALBITAL, ACETAMINOPHEN AND CAFFEINE 50; 325; 40 MG/1; MG/1; MG/1
1 TABLET ORAL EVERY 4 HOURS PRN
Status: DISCONTINUED | OUTPATIENT
Start: 2025-05-24 | End: 2025-05-26 | Stop reason: HOSPADM

## 2025-05-24 RX ORDER — AMLODIPINE BESYLATE 5 MG/1
5 TABLET ORAL DAILY
Status: DISCONTINUED | OUTPATIENT
Start: 2025-05-24 | End: 2025-05-26 | Stop reason: HOSPADM

## 2025-05-24 RX ORDER — ACETAMINOPHEN 650 MG/1
650 SUPPOSITORY RECTAL EVERY 6 HOURS PRN
Status: DISCONTINUED | OUTPATIENT
Start: 2025-05-24 | End: 2025-05-26 | Stop reason: HOSPADM

## 2025-05-24 RX ORDER — ROSUVASTATIN CALCIUM 40 MG/1
40 TABLET, COATED ORAL EVERY EVENING
Status: DISCONTINUED | OUTPATIENT
Start: 2025-05-24 | End: 2025-05-26 | Stop reason: HOSPADM

## 2025-05-24 RX ORDER — CLONAZEPAM 0.5 MG/1
0.5 TABLET ORAL 2 TIMES DAILY PRN
Status: DISCONTINUED | OUTPATIENT
Start: 2025-05-24 | End: 2025-05-26 | Stop reason: HOSPADM

## 2025-05-24 RX ORDER — DONEPEZIL HYDROCHLORIDE 5 MG/1
5 TABLET, FILM COATED ORAL NIGHTLY
Status: DISCONTINUED | OUTPATIENT
Start: 2025-05-24 | End: 2025-05-26 | Stop reason: HOSPADM

## 2025-05-24 RX ORDER — ONDANSETRON 4 MG/1
4 TABLET, ORALLY DISINTEGRATING ORAL EVERY 8 HOURS PRN
Status: DISCONTINUED | OUTPATIENT
Start: 2025-05-24 | End: 2025-05-26 | Stop reason: HOSPADM

## 2025-05-24 RX ORDER — DEXTROSE MONOHYDRATE 100 MG/ML
INJECTION, SOLUTION INTRAVENOUS CONTINUOUS PRN
Status: DISCONTINUED | OUTPATIENT
Start: 2025-05-24 | End: 2025-05-26 | Stop reason: HOSPADM

## 2025-05-24 RX ORDER — ALBUTEROL SULFATE 0.83 MG/ML
2.5 SOLUTION RESPIRATORY (INHALATION) EVERY 4 HOURS PRN
Status: DISCONTINUED | OUTPATIENT
Start: 2025-05-24 | End: 2025-05-26 | Stop reason: HOSPADM

## 2025-05-24 RX ORDER — BUTALBITAL AND ACETAMINOPHEN 325; 50 MG/1; MG/1
1 TABLET ORAL EVERY 4 HOURS PRN
Status: DISCONTINUED | OUTPATIENT
Start: 2025-05-24 | End: 2025-05-24 | Stop reason: CLARIF

## 2025-05-24 RX ORDER — INSULIN GLARGINE 100 [IU]/ML
34 INJECTION, SOLUTION SUBCUTANEOUS DAILY
Status: DISCONTINUED | OUTPATIENT
Start: 2025-05-24 | End: 2025-05-26 | Stop reason: HOSPADM

## 2025-05-24 RX ORDER — METOPROLOL TARTRATE 25 MG/1
25 TABLET, FILM COATED ORAL 2 TIMES DAILY
Status: DISCONTINUED | OUTPATIENT
Start: 2025-05-24 | End: 2025-05-26 | Stop reason: HOSPADM

## 2025-05-24 RX ORDER — IOPAMIDOL 755 MG/ML
75 INJECTION, SOLUTION INTRAVASCULAR
Status: COMPLETED | OUTPATIENT
Start: 2025-05-24 | End: 2025-05-24

## 2025-05-24 RX ORDER — ASPIRIN 325 MG
325 TABLET ORAL ONCE
Status: COMPLETED | OUTPATIENT
Start: 2025-05-24 | End: 2025-05-24

## 2025-05-24 RX ORDER — GLUCAGON 1 MG/ML
1 KIT INJECTION PRN
Status: DISCONTINUED | OUTPATIENT
Start: 2025-05-24 | End: 2025-05-26 | Stop reason: HOSPADM

## 2025-05-24 RX ORDER — ENOXAPARIN SODIUM 100 MG/ML
40 INJECTION SUBCUTANEOUS DAILY
Status: DISCONTINUED | OUTPATIENT
Start: 2025-05-25 | End: 2025-05-26 | Stop reason: HOSPADM

## 2025-05-24 RX ORDER — ROPINIROLE 0.25 MG/1
0.25 TABLET, FILM COATED ORAL 2 TIMES DAILY
Status: DISCONTINUED | OUTPATIENT
Start: 2025-05-24 | End: 2025-05-26 | Stop reason: HOSPADM

## 2025-05-24 RX ORDER — GABAPENTIN 300 MG/1
600 CAPSULE ORAL EVERY EVENING
Status: DISCONTINUED | OUTPATIENT
Start: 2025-05-24 | End: 2025-05-26 | Stop reason: HOSPADM

## 2025-05-24 RX ORDER — SODIUM CHLORIDE 0.9 % (FLUSH) 0.9 %
5-40 SYRINGE (ML) INJECTION EVERY 12 HOURS SCHEDULED
Status: DISCONTINUED | OUTPATIENT
Start: 2025-05-24 | End: 2025-05-26 | Stop reason: HOSPADM

## 2025-05-24 RX ORDER — BUPROPION HYDROCHLORIDE 150 MG/1
150 TABLET, EXTENDED RELEASE ORAL 2 TIMES DAILY
Status: DISCONTINUED | OUTPATIENT
Start: 2025-05-24 | End: 2025-05-24

## 2025-05-24 RX ORDER — 0.9 % SODIUM CHLORIDE 0.9 %
1000 INTRAVENOUS SOLUTION INTRAVENOUS ONCE
Status: COMPLETED | OUTPATIENT
Start: 2025-05-24 | End: 2025-05-24

## 2025-05-24 RX ORDER — M-VIT,TX,IRON,MINS/CALC/FOLIC 27MG-0.4MG
1 TABLET ORAL DAILY
Status: DISCONTINUED | OUTPATIENT
Start: 2025-05-24 | End: 2025-05-26 | Stop reason: HOSPADM

## 2025-05-24 RX ORDER — PANTOPRAZOLE SODIUM 40 MG/1
40 TABLET, DELAYED RELEASE ORAL DAILY
Status: DISCONTINUED | OUTPATIENT
Start: 2025-05-24 | End: 2025-05-26 | Stop reason: HOSPADM

## 2025-05-24 RX ORDER — POTASSIUM CHLORIDE 1500 MG/1
40 TABLET, EXTENDED RELEASE ORAL PRN
Status: DISCONTINUED | OUTPATIENT
Start: 2025-05-24 | End: 2025-05-26 | Stop reason: HOSPADM

## 2025-05-24 RX ADMIN — PANTOPRAZOLE SODIUM 40 MG: 40 TABLET, DELAYED RELEASE ORAL at 19:00

## 2025-05-24 RX ADMIN — ARFORMOTEROL TARTRATE: 15 SOLUTION RESPIRATORY (INHALATION) at 21:46

## 2025-05-24 RX ADMIN — SODIUM CHLORIDE 1000 ML: 0.9 INJECTION, SOLUTION INTRAVENOUS at 15:32

## 2025-05-24 RX ADMIN — SODIUM CHLORIDE: 0.9 INJECTION, SOLUTION INTRAVENOUS at 17:41

## 2025-05-24 RX ADMIN — ASPIRIN 325 MG: 325 TABLET ORAL at 15:35

## 2025-05-24 RX ADMIN — LISINOPRIL 10 MG: 10 TABLET ORAL at 19:00

## 2025-05-24 RX ADMIN — GABAPENTIN 600 MG: 300 CAPSULE ORAL at 19:01

## 2025-05-24 RX ADMIN — METOPROLOL TARTRATE 25 MG: 25 TABLET, FILM COATED ORAL at 20:21

## 2025-05-24 RX ADMIN — ROSUVASTATIN 40 MG: 40 TABLET, FILM COATED ORAL at 19:00

## 2025-05-24 RX ADMIN — CLONAZEPAM 0.5 MG: 0.5 TABLET ORAL at 20:41

## 2025-05-24 RX ADMIN — BUTALBITAL, ACETAMINOPHEN, AND CAFFEINE 1 TABLET: 50; 325; 40 TABLET ORAL at 15:35

## 2025-05-24 RX ADMIN — Medication 10 ML: at 20:21

## 2025-05-24 RX ADMIN — ROPINIROLE HYDROCHLORIDE 0.25 MG: 0.25 TABLET, FILM COATED ORAL at 20:21

## 2025-05-24 RX ADMIN — IOPAMIDOL 75 ML: 755 INJECTION, SOLUTION INTRAVENOUS at 14:35

## 2025-05-24 RX ADMIN — LEVETIRACETAM 500 MG: 500 TABLET, FILM COATED ORAL at 20:21

## 2025-05-24 RX ADMIN — DONEPEZIL HYDROCHLORIDE 5 MG: 5 TABLET, FILM COATED ORAL at 20:21

## 2025-05-24 RX ADMIN — Medication 1 TABLET: at 19:00

## 2025-05-24 RX ADMIN — BUTALBITAL, ACETAMINOPHEN, AND CAFFEINE 1 TABLET: 50; 325; 40 TABLET ORAL at 19:47

## 2025-05-24 RX ADMIN — AMLODIPINE BESYLATE 5 MG: 5 TABLET ORAL at 19:00

## 2025-05-24 ASSESSMENT — PAIN DESCRIPTION - ORIENTATION
ORIENTATION: ANTERIOR
ORIENTATION: ANTERIOR
ORIENTATION: OTHER (COMMENT)

## 2025-05-24 ASSESSMENT — PAIN DESCRIPTION - DESCRIPTORS
DESCRIPTORS: ACHING
DESCRIPTORS: THROBBING
DESCRIPTORS: ACHING

## 2025-05-24 ASSESSMENT — PAIN - FUNCTIONAL ASSESSMENT
PAIN_FUNCTIONAL_ASSESSMENT: 0-10
PAIN_FUNCTIONAL_ASSESSMENT: ACTIVITIES ARE NOT PREVENTED
PAIN_FUNCTIONAL_ASSESSMENT: ACTIVITIES ARE NOT PREVENTED

## 2025-05-24 ASSESSMENT — PAIN DESCRIPTION - ONSET
ONSET: ON-GOING
ONSET: ON-GOING

## 2025-05-24 ASSESSMENT — PAIN DESCRIPTION - PAIN TYPE
TYPE: ACUTE PAIN
TYPE: ACUTE PAIN

## 2025-05-24 ASSESSMENT — PAIN DESCRIPTION - LOCATION
LOCATION: HEAD

## 2025-05-24 ASSESSMENT — PAIN SCALES - GENERAL
PAINLEVEL_OUTOF10: 3
PAINLEVEL_OUTOF10: 10
PAINLEVEL_OUTOF10: 8
PAINLEVEL_OUTOF10: 10

## 2025-05-24 ASSESSMENT — PAIN DESCRIPTION - FREQUENCY
FREQUENCY: CONTINUOUS
FREQUENCY: CONTINUOUS

## 2025-05-24 NOTE — ED NOTES
Pt IV lines have infiltrated with multiple RN and Rad Tech PIV attempts. No swelling or redness noted at sites.

## 2025-05-24 NOTE — H&P
Hospitalist  History and Physical    Patient:  Viji Gamez  MRN: [id number]  PCP: No primary care provider on file.    CHIEF COMPLAINT: Headache      HISTORY OF PRESENT ILLNESS:   The patient Viji Gamez is a 75 y.o.female with history of For COPD atrial fibrillation patient has watchman in place, diabetes mellitus coronary artery disease status post PCI.  Patient also has a history of CVAs in the past and a history of dementia.  Patient also has a history of migraine headaches.  Patient presented to the emergency room with complaint of headache.  Patient does report that she has a history of migraine headaches but otherwise patient is a poor historian.  No recent history of fever chills no history of nausea vomiting or diarrhea no history of hematemesis or melena no history of urinary symptoms.    Past Medical History:        Diagnosis Date    A-fib (HCC)     COPD (chronic obstructive pulmonary disease) (HCC)     Diabetes mellitus (HCC)     H/O heart artery stent     Hypertension     Presence of Watchman left atrial appendage closure device        Past Surgical History:        Procedure Laterality Date    BACK SURGERY      CATARACT EXTRACTION         Medications Prior to Admission:    Prior to Admission medications    Medication Sig Start Date End Date Taking? Authorizing Provider   levETIRAcetam (KEPPRA) 500 MG tablet Take 1 tablet by mouth 2 times daily 4/9/24   Ender Paez MD   alendronate (FOSAMAX) 70 MG tablet Take 1 tablet by mouth every 7 days 4/9/24   Ender Paez MD   ferrous sulfate (IRON 325) 325 (65 Fe) MG tablet Take 1 tablet by mouth daily (with breakfast) 4/9/24   Ender Paez MD   lisinopril (PRINIVIL;ZESTRIL) 10 MG tablet Take 1 tablet by mouth daily 4/9/24   Ender Paez MD   donepezil (ARICEPT) 5 MG tablet Take 1 tablet by mouth nightly 4/9/24   Ender Paez MD   rOPINIRole (REQUIP) 0.25 MG tablet Take 1 tablet by mouth in the morning and at bedtime 4/9/24   Ender Paez

## 2025-05-24 NOTE — ED NOTES
ED to Inpatient Handoff SBAR    Patient Name: Viji Gamez   :  1950  75 y.o.   MRN:  [id number]  Preferred Name  Viji  ED Room #:  10/10  Family/Caregiver Present no     Chief Complaint Altered Mental Status and Headache       Restraints no   Sitter no   Sepsis Risk Score    Isolation No active isolations   Fall Risk Assessment No data recorded     Situation  Code Status: Prior No additional code details.    Allergies: Cymbalta [duloxetine hcl] and Sulfa antibiotics  Weight: Patient Vitals for the past 96 hrs (Last 3 readings):   Weight   25 1400 54.9 kg (121 lb 1.6 oz)     Arrived from: home  Hospital Problem/Diagnosis:  Principal Problem:    Headache  Resolved Problems:    * No resolved hospital problems. *    Imaging:   XR HIP 2-3 VW W PELVIS LEFT   Final Result   1. Left hip replacement without acute osseous abnormality.      Electronically signed by Jose Burns      XR CHEST PORTABLE   Final Result   1. Cardiomegaly without acute cardiopulmonary abnormality.      Electronically signed by Jose Burns      CT HEAD WO CONTRAST   Final Result      1. No acute intracranial hemorrhage or mass effect.   2. Moderate foci of wedgelike low-attenuation right frontal lobe and right parietal lobe, new from  but suggesting subacute to old infarcts.   3. Diffuse white matter disease.   4. Mild cortical atrophy.   5. Diffuse sinus disease.            This dictation was created with voice recognition software.  While attempts have been made to review the dictation as it was transcribed, occasionally the spoken word can be misinterpreted  by the technology, leading to omissions or inappropriate words    or phrases.  If questions or concerns arise, please contact the Radiology Department.      Electronically signed by Gus Montalvo MD      CTA HEAD NECK W CONTRAST   Final Result      Neck      1. Motion limited evaluation with no acute vascular abnormality identified in   the neck. No vascular

## 2025-05-24 NOTE — ED TRIAGE NOTES
Patient arrives to ED via EMS from [facility] c/o headache and altered mental status. Per EMS, patient has dementia and a caregiver, they are unsure when her LKW is. Pt wears 3L NC at baseline. HX: COPD, dementia

## 2025-05-24 NOTE — ED NOTES
Pt earrings placed in specimen cup. Earrings in cup wrapped in patient shirt and bra, patient labels placed on specimen cup. No family present to turn over belongings.

## 2025-05-24 NOTE — ED PROVIDER NOTES
[facility] EMERGENCY DEPARTMENT      CHIEF COMPLAINT  Altered Mental Status and Headache       HISTORY OF PRESENT ILLNESS  Viji Gamez is a 75 y.o. female  who presents to the ED complaining of severe headache.  Patient states that started last night.  She is unsure if it was there when she went to bed or if it woke her at some point.  Patient with a significant history of A-fib and states that she has a watchman.  She also reportedly has some confusion at baseline.  She currently resides in an independent living facility.  Patient denies any associated chest pain.  No visual changes.  She was brought in by EMS after the facility noted that she was not quite acting at her baseline in regards to confusion.  Patient did not have any focal neurologic findings per EMS other than the confusion and not knowing where she was or the date.    Most recent hospitalization within our system was reviewed dated 7/1/2024 from the hospitalist service at which time patient had been admitted with confusion and metabolic encephalopathy thought to be secondary to a UTI.  She was also noted to have a significant history of dementia and also CVA in the past.    No other complaints, modifying factors or associated symptoms.     I have reviewed the following from the nursing documentation.    Past Medical History:   Diagnosis Date    A-fib (HCC)     COPD (chronic obstructive pulmonary disease) (HCC)     Diabetes mellitus (HCC)     H/O heart artery stent     Hypertension     Presence of Watchman left atrial appendage closure device      Past Surgical History:   Procedure Laterality Date    BACK SURGERY      CATARACT EXTRACTION       No family history on file.  Social History     Socioeconomic History    Marital status:      Spouse name: Not on file    Number of children: Not on file    Years of education: Not on file    Highest education level: Not on file   Occupational History    Not on file   Tobacco Use    Smoking

## 2025-05-25 LAB
BASOPHILS # BLD: 0.07 K/UL (ref 0–0.2)
BASOPHILS NFR BLD: 1 %
EOSINOPHIL # BLD: 1.37 K/UL (ref 0–0.6)
EOSINOPHILS RELATIVE PERCENT: 17 %
ERYTHROCYTE [DISTWIDTH] IN BLOOD BY AUTOMATED COUNT: 13 % (ref 12.4–15.4)
EST. AVERAGE GLUCOSE BLD GHB EST-MCNC: 123 MG/DL
GLUCOSE BLD-MCNC: 187 MG/DL (ref 70–99)
HBA1C MFR BLD: 5.9 %
HCT VFR BLD AUTO: 40 % (ref 36–48)
HGB BLD-MCNC: 13.6 G/DL (ref 12–16)
IMM GRANULOCYTES # BLD AUTO: 0.05 K/UL (ref 0–0.5)
IMM GRANULOCYTES NFR BLD: 1 %
LEVETIRACETAM SERPL-MCNC: 4 UG/ML (ref 6–46)
LYMPHOCYTES NFR BLD: 1.19 K/UL (ref 1–5.1)
LYMPHOCYTES RELATIVE PERCENT: 15 %
MCH RBC QN AUTO: 28.2 PG (ref 26–34)
MCHC RBC AUTO-ENTMCNC: 34 G/DL (ref 31–36)
MCV RBC AUTO: 83 FL (ref 80–100)
MONOCYTES NFR BLD: 1.13 K/UL (ref 0–1.3)
MONOCYTES NFR BLD: 14 %
NEUTROPHILS NFR BLD: 52 %
NEUTS SEG NFR BLD: 4.07 K/UL (ref 1.7–7.7)
PLATELET # BLD AUTO: 324 K/UL (ref 135–450)
PMV BLD AUTO: 9.8 FL
RBC # BLD AUTO: 4.82 M/UL (ref 4–5.2)
WBC OTHER # BLD: 7.9 K/UL (ref 4–11)

## 2025-05-25 PROCEDURE — 36415 COLL VENOUS BLD VENIPUNCTURE: CPT

## 2025-05-25 PROCEDURE — 6360000002 HC RX W HCPCS: Performed by: HOSPITALIST

## 2025-05-25 PROCEDURE — 85025 COMPLETE CBC W/AUTO DIFF WBC: CPT

## 2025-05-25 PROCEDURE — 97166 OT EVAL MOD COMPLEX 45 MIN: CPT

## 2025-05-25 PROCEDURE — 6370000000 HC RX 637 (ALT 250 FOR IP): Performed by: HOSPITALIST

## 2025-05-25 PROCEDURE — 97535 SELF CARE MNGMENT TRAINING: CPT

## 2025-05-25 PROCEDURE — 82962 GLUCOSE BLOOD TEST: CPT

## 2025-05-25 PROCEDURE — 94640 AIRWAY INHALATION TREATMENT: CPT

## 2025-05-25 PROCEDURE — 83036 HEMOGLOBIN GLYCOSYLATED A1C: CPT

## 2025-05-25 PROCEDURE — 97530 THERAPEUTIC ACTIVITIES: CPT

## 2025-05-25 PROCEDURE — 2060000000 HC ICU INTERMEDIATE R&B

## 2025-05-25 RX ORDER — PHENAZOPYRIDINE HYDROCHLORIDE 100 MG/1
200 TABLET, FILM COATED ORAL
Status: DISCONTINUED | OUTPATIENT
Start: 2025-05-25 | End: 2025-05-26 | Stop reason: HOSPADM

## 2025-05-25 RX ADMIN — ERGOCALCIFEROL 50000 UNITS: 1.25 CAPSULE ORAL at 09:10

## 2025-05-25 RX ADMIN — GABAPENTIN 600 MG: 300 CAPSULE ORAL at 17:09

## 2025-05-25 RX ADMIN — DONEPEZIL HYDROCHLORIDE 5 MG: 5 TABLET, FILM COATED ORAL at 21:08

## 2025-05-25 RX ADMIN — ROPINIROLE HYDROCHLORIDE 0.25 MG: 0.25 TABLET, FILM COATED ORAL at 09:10

## 2025-05-25 RX ADMIN — METOPROLOL TARTRATE 25 MG: 25 TABLET, FILM COATED ORAL at 21:08

## 2025-05-25 RX ADMIN — METOPROLOL TARTRATE 25 MG: 25 TABLET, FILM COATED ORAL at 09:11

## 2025-05-25 RX ADMIN — ARFORMOTEROL TARTRATE: 15 SOLUTION RESPIRATORY (INHALATION) at 07:05

## 2025-05-25 RX ADMIN — LEVETIRACETAM 500 MG: 500 TABLET, FILM COATED ORAL at 09:11

## 2025-05-25 RX ADMIN — GABAPENTIN 300 MG: 300 CAPSULE ORAL at 09:10

## 2025-05-25 RX ADMIN — LEVETIRACETAM 500 MG: 500 TABLET, FILM COATED ORAL at 21:08

## 2025-05-25 RX ADMIN — ACETAMINOPHEN 650 MG: 325 TABLET ORAL at 18:57

## 2025-05-25 RX ADMIN — ROSUVASTATIN 40 MG: 40 TABLET, FILM COATED ORAL at 17:10

## 2025-05-25 RX ADMIN — ROPINIROLE HYDROCHLORIDE 0.25 MG: 0.25 TABLET, FILM COATED ORAL at 21:08

## 2025-05-25 RX ADMIN — ALBUTEROL SULFATE 2.5 MG: 2.5 SOLUTION RESPIRATORY (INHALATION) at 15:37

## 2025-05-25 RX ADMIN — TIOTROPIUM BROMIDE INHALATION SPRAY 2 PUFF: 3.12 SPRAY, METERED RESPIRATORY (INHALATION) at 07:05

## 2025-05-25 RX ADMIN — LISINOPRIL 10 MG: 10 TABLET ORAL at 09:11

## 2025-05-25 RX ADMIN — Medication 1 TABLET: at 09:10

## 2025-05-25 RX ADMIN — FERROUS SULFATE TAB 325 MG (65 MG ELEMENTAL FE) 325 MG: 325 (65 FE) TAB at 09:11

## 2025-05-25 RX ADMIN — ENOXAPARIN SODIUM 40 MG: 100 INJECTION SUBCUTANEOUS at 09:10

## 2025-05-25 RX ADMIN — INSULIN GLARGINE 34 UNITS: 100 INJECTION, SOLUTION SUBCUTANEOUS at 09:10

## 2025-05-25 RX ADMIN — AMLODIPINE BESYLATE 5 MG: 5 TABLET ORAL at 09:11

## 2025-05-25 RX ADMIN — PHENAZOPYRIDINE HYDROCHLORIDE 200 MG: 100 TABLET ORAL at 12:11

## 2025-05-25 RX ADMIN — PANTOPRAZOLE SODIUM 40 MG: 40 TABLET, DELAYED RELEASE ORAL at 09:11

## 2025-05-25 RX ADMIN — ASPIRIN 81 MG: 81 TABLET, COATED ORAL at 09:10

## 2025-05-25 RX ADMIN — PHENAZOPYRIDINE HYDROCHLORIDE 200 MG: 100 TABLET ORAL at 17:09

## 2025-05-25 RX ADMIN — CLONAZEPAM 0.5 MG: 0.5 TABLET ORAL at 13:14

## 2025-05-25 RX ADMIN — ARFORMOTEROL TARTRATE: 15 SOLUTION RESPIRATORY (INHALATION) at 20:45

## 2025-05-25 ASSESSMENT — PAIN DESCRIPTION - LOCATION
LOCATION: SHOULDER
LOCATION: SHOULDER

## 2025-05-25 ASSESSMENT — PAIN DESCRIPTION - ORIENTATION
ORIENTATION: RIGHT
ORIENTATION: RIGHT

## 2025-05-25 ASSESSMENT — PAIN SCALES - GENERAL
PAINLEVEL_OUTOF10: 0
PAINLEVEL_OUTOF10: 6
PAINLEVEL_OUTOF10: 5

## 2025-05-25 ASSESSMENT — PAIN DESCRIPTION - DESCRIPTORS
DESCRIPTORS: ACHING
DESCRIPTORS: DISCOMFORT

## 2025-05-25 ASSESSMENT — PAIN SCALES - WONG BAKER: WONGBAKER_NUMERICALRESPONSE: NO HURT

## 2025-05-25 NOTE — PLAN OF CARE
Problem: Chronic Conditions and Co-morbidities  Goal: Patient's chronic conditions and co-morbidity symptoms are monitored and maintained or improved  Outcome: Progressing     Problem: Discharge Planning  Goal: Discharge to home or other facility with appropriate resources  Outcome: Progressing     Problem: Pain  Goal: Verbalizes/displays adequate comfort level or baseline comfort level  Outcome: Progressing     Problem: Safety - Adult  Goal: Free from fall injury  5/24/2025 2139 by Abigail Sal, RN  Outcome: Progressing  5/24/2025 1833 by Kalpana Artis, RN  Outcome: Progressing

## 2025-05-26 VITALS
WEIGHT: 121.1 LBS | SYSTOLIC BLOOD PRESSURE: 130 MMHG | RESPIRATION RATE: 20 BRPM | BODY MASS INDEX: 23.78 KG/M2 | TEMPERATURE: 97.9 F | DIASTOLIC BLOOD PRESSURE: 80 MMHG | HEIGHT: 60 IN | OXYGEN SATURATION: 94 % | HEART RATE: 68 BPM

## 2025-05-26 LAB
ANION GAP SERPL CALCULATED.3IONS-SCNC: 12 MMOL/L (ref 3–16)
BUN SERPL-MCNC: 19 MG/DL (ref 7–20)
CALCIUM SERPL-MCNC: 9.4 MG/DL (ref 8.3–10.6)
CHLORIDE SERPL-SCNC: 98 MMOL/L (ref 99–110)
CO2 SERPL-SCNC: 24 MMOL/L (ref 21–32)
CREAT SERPL-MCNC: 0.6 MG/DL (ref 0.6–1.2)
ERYTHROCYTE [DISTWIDTH] IN BLOOD BY AUTOMATED COUNT: 13 % (ref 12.4–15.4)
GFR, ESTIMATED: >90 ML/MIN/1.73M2
GLUCOSE BLD-MCNC: 212 MG/DL (ref 70–99)
GLUCOSE SERPL-MCNC: 152 MG/DL (ref 70–99)
HCT VFR BLD AUTO: 37.6 % (ref 36–48)
HGB BLD-MCNC: 12.6 G/DL (ref 12–16)
MCH RBC QN AUTO: 28.4 PG (ref 26–34)
MCHC RBC AUTO-ENTMCNC: 33.5 G/DL (ref 31–36)
MCV RBC AUTO: 84.9 FL (ref 80–100)
PLATELET # BLD AUTO: 329 K/UL (ref 135–450)
PMV BLD AUTO: 9.8 FL (ref 9.4–12.4)
POTASSIUM SERPL-SCNC: 3.7 MMOL/L (ref 3.5–5.1)
PROCALCITONIN SERPL-MCNC: 0.05 NG/ML (ref 0–0.15)
RBC # BLD AUTO: 4.43 M/UL (ref 4–5.2)
SODIUM SERPL-SCNC: 135 MMOL/L (ref 136–145)
WBC OTHER # BLD: 9.6 K/UL (ref 4–11)

## 2025-05-26 PROCEDURE — 97116 GAIT TRAINING THERAPY: CPT

## 2025-05-26 PROCEDURE — 85027 COMPLETE CBC AUTOMATED: CPT

## 2025-05-26 PROCEDURE — 84145 PROCALCITONIN (PCT): CPT

## 2025-05-26 PROCEDURE — 94640 AIRWAY INHALATION TREATMENT: CPT

## 2025-05-26 PROCEDURE — 97530 THERAPEUTIC ACTIVITIES: CPT

## 2025-05-26 PROCEDURE — 6360000002 HC RX W HCPCS: Performed by: HOSPITALIST

## 2025-05-26 PROCEDURE — 94761 N-INVAS EAR/PLS OXIMETRY MLT: CPT

## 2025-05-26 PROCEDURE — 2700000000 HC OXYGEN THERAPY PER DAY

## 2025-05-26 PROCEDURE — 82962 GLUCOSE BLOOD TEST: CPT

## 2025-05-26 PROCEDURE — 97162 PT EVAL MOD COMPLEX 30 MIN: CPT

## 2025-05-26 PROCEDURE — 80048 BASIC METABOLIC PNL TOTAL CA: CPT

## 2025-05-26 PROCEDURE — 36415 COLL VENOUS BLD VENIPUNCTURE: CPT

## 2025-05-26 PROCEDURE — 6370000000 HC RX 637 (ALT 250 FOR IP): Performed by: HOSPITALIST

## 2025-05-26 RX ORDER — AMLODIPINE BESYLATE 5 MG/1
5 TABLET ORAL DAILY
Qty: 90 TABLET | Refills: 0 | Status: SHIPPED | OUTPATIENT
Start: 2025-05-27

## 2025-05-26 RX ORDER — DULOXETIN HYDROCHLORIDE 30 MG/1
30 CAPSULE, DELAYED RELEASE ORAL DAILY
COMMUNITY
Start: 2025-05-19

## 2025-05-26 RX ORDER — OXYCODONE AND ACETAMINOPHEN 5; 325 MG/1; MG/1
TABLET ORAL
COMMUNITY
Start: 2025-04-21

## 2025-05-26 RX ORDER — DESVENLAFAXINE 25 MG/1
25 TABLET, EXTENDED RELEASE ORAL DAILY
COMMUNITY

## 2025-05-26 RX ADMIN — ASPIRIN 81 MG: 81 TABLET, COATED ORAL at 10:34

## 2025-05-26 RX ADMIN — ACETAMINOPHEN 650 MG: 325 TABLET ORAL at 01:35

## 2025-05-26 RX ADMIN — FERROUS SULFATE TAB 325 MG (65 MG ELEMENTAL FE) 325 MG: 325 (65 FE) TAB at 10:45

## 2025-05-26 RX ADMIN — PHENAZOPYRIDINE HYDROCHLORIDE 200 MG: 100 TABLET ORAL at 10:34

## 2025-05-26 RX ADMIN — LEVETIRACETAM 500 MG: 500 TABLET, FILM COATED ORAL at 10:34

## 2025-05-26 RX ADMIN — ACETAMINOPHEN 650 MG: 325 TABLET ORAL at 10:45

## 2025-05-26 RX ADMIN — METOPROLOL TARTRATE 25 MG: 25 TABLET, FILM COATED ORAL at 10:32

## 2025-05-26 RX ADMIN — ROPINIROLE HYDROCHLORIDE 0.25 MG: 0.25 TABLET, FILM COATED ORAL at 10:33

## 2025-05-26 RX ADMIN — INSULIN GLARGINE 34 UNITS: 100 INJECTION, SOLUTION SUBCUTANEOUS at 10:32

## 2025-05-26 RX ADMIN — CLONAZEPAM 0.5 MG: 0.5 TABLET ORAL at 01:36

## 2025-05-26 RX ADMIN — PANTOPRAZOLE SODIUM 40 MG: 40 TABLET, DELAYED RELEASE ORAL at 10:34

## 2025-05-26 RX ADMIN — ALBUTEROL SULFATE 2.5 MG: 2.5 SOLUTION RESPIRATORY (INHALATION) at 13:03

## 2025-05-26 RX ADMIN — LISINOPRIL 10 MG: 10 TABLET ORAL at 10:34

## 2025-05-26 RX ADMIN — Medication 1 TABLET: at 10:34

## 2025-05-26 RX ADMIN — PHENAZOPYRIDINE HYDROCHLORIDE 200 MG: 100 TABLET ORAL at 15:06

## 2025-05-26 RX ADMIN — CLONAZEPAM 0.5 MG: 0.5 TABLET ORAL at 14:17

## 2025-05-26 RX ADMIN — AMLODIPINE BESYLATE 5 MG: 5 TABLET ORAL at 10:34

## 2025-05-26 RX ADMIN — GABAPENTIN 300 MG: 300 CAPSULE ORAL at 10:34

## 2025-05-26 RX ADMIN — ENOXAPARIN SODIUM 40 MG: 100 INJECTION SUBCUTANEOUS at 10:32

## 2025-05-26 ASSESSMENT — PAIN DESCRIPTION - LOCATION
LOCATION: THROAT
LOCATION: KNEE

## 2025-05-26 ASSESSMENT — PAIN SCALES - GENERAL
PAINLEVEL_OUTOF10: 0
PAINLEVEL_OUTOF10: 7
PAINLEVEL_OUTOF10: 0
PAINLEVEL_OUTOF10: 4
PAINLEVEL_OUTOF10: 0

## 2025-05-26 ASSESSMENT — PAIN SCALES - WONG BAKER: WONGBAKER_NUMERICALRESPONSE: NO HURT

## 2025-05-26 ASSESSMENT — PAIN - FUNCTIONAL ASSESSMENT: PAIN_FUNCTIONAL_ASSESSMENT: ACTIVITIES ARE NOT PREVENTED

## 2025-05-26 ASSESSMENT — PAIN DESCRIPTION - DESCRIPTORS
DESCRIPTORS: SORE
DESCRIPTORS: ACHING

## 2025-05-26 ASSESSMENT — PAIN DESCRIPTION - ORIENTATION
ORIENTATION: OTHER (COMMENT)
ORIENTATION: LEFT

## 2025-05-26 NOTE — PROGRESS NOTES
Hospitalist Progress Note  5/25/2025 10:10 AM    PCP: No primary care provider on file.    [phone]     Date of Admission: 5/24/2025                                                                                                                     HOSPITAL COURSE    Patient demographics:  The patient  Viji Gamez is a 75 y.o. female     Significant past medical history:   Patient Active Problem List   Diagnosis    Sepsis (HCC)    Hyponatremia    Acute encephalopathy    Headache         Presenting symptoms:      Diagnostic workup:      CONSULTS DURING ADMISSION :   IP CONSULT TO HOSPITALIST      Patient was diagnosed with:        Treatment while inpatient:  The patient Viji Gamez is a 75 y.o.female with history of For COPD atrial fibrillation patient has watchman in place, diabetes mellitus coronary artery disease status post PCI.  Patient also has a history of CVAs in the past and a history of dementia.  Patient also has a history of migraine headaches.      Patient presented to the emergency room with complaint of headache                                                                                       ----------------------------------------------------------      SUBJECTIVE COMPLAINTS-     Diet: ADULT DIET; Regular      OBJECTIVE:   Patient Active Problem List   Diagnosis    Sepsis (HCC)    Hyponatremia    Acute encephalopathy    Headache       Allergies  Cymbalta [duloxetine hcl] and Sulfa antibiotics    Medications    Scheduled Meds:   aspirin  81 mg Oral Daily    donepezil  5 mg Oral Nightly    vitamin D  50,000 Units Oral Weekly    ferrous sulfate  325 mg Oral Daily with breakfast    gabapentin  300 mg Oral QAM    gabapentin  600 mg Oral QPM    insulin glargine  34 Units SubCUTAneous Daily    levETIRAcetam  500 mg Oral BID    lisinopril  10 mg Oral Daily    metoprolol tartrate  25 mg Oral BID    arformoterol 15 mcg-budesonide 0.5 mg neb solution   Nebulization BID RT    therapeutic 
    Hospitalist Progress Note  5/26/2025 9:17 AM    PCP: No primary care provider on file.    [phone]     Date of Admission: 5/24/2025                                                                                                                     HOSPITAL COURSE    Patient demographics:  The patient  Viji Gamez is a 75 y.o. female     Significant past medical history:   Patient Active Problem List   Diagnosis    Sepsis (HCC)    Hyponatremia    Acute encephalopathy    Headache         Presenting symptoms:      Diagnostic workup:      CONSULTS DURING ADMISSION :   IP CONSULT TO HOSPITALIST      Patient was diagnosed with:  Hypertensive urgency  Headache    Treatment while inpatient:  The patient Viji Gamez is a 75 y.o.female with history of For COPD atrial fibrillation patient has watchman in place, diabetes mellitus coronary artery disease status post PCI.  Patient also has a history of CVAs in the past and a history of dementia.  Patient also has a history of migraine headaches.    Patient presented to the emergency room with complaint of headache     Patient was diagnosed with hypertensive urgency.  Patient was started on amlodipine to control blood pressure with as needed hydralazine.  As patient's blood headache also improved.  Patient is will continue on amlodipine and follow-up with the primary care physician                           Discharge Condition:  stable     Discharged to:  Home     Activity:   as tolerated:    Follow Up:  Follow-up with PCP in 1-2 weeks                                                                   ----------------------------------------------------------      SUBJECTIVE COMPLAINTS-     Diet: ADULT DIET; Regular      OBJECTIVE:   Patient Active Problem List   Diagnosis    Sepsis (HCC)    Hyponatremia    Acute encephalopathy    Headache       Allergies  Cymbalta [duloxetine hcl] and Sulfa antibiotics    Medications    Scheduled Meds:   phenazopyridine  200 mg 
4 Eyes Skin Assessment     NAME:  Viji Gamez  YOB: 1950  MEDICAL RECORD NUMBER:  [id number]    The patient is being assessed for  Admission    I agree that at least one RN has performed a thorough Head to Toe Skin Assessment on the patient. ALL assessment sites listed below have been assessed.      Areas assessed by both nurses:    Head, Face, Ears, Shoulders, Back, Chest, Arms, Elbows, Hands, Sacrum. Buttock, Coccyx, Ischium, Legs. Feet and Heels, Under Medical Devices , and Other          Does the Patient have a Wound? No noted wound(s)       Reza Prevention initiated by RN: No  Wound Care Orders initiated by RN: No    Pressure Injury (Stage 3,4, Unstageable, DTI, NWPT, and Complex wounds) if present, place Wound referral order by RN under : No    New Ostomies, if present place, Ostomy referral order under : No     Nurse 1 eSignature: Electronically signed by Kalpana Artis RN on 5/24/25 at 6:19 PM EDT    **SHARE this note so that the co-signing nurse can place an eSignature**    Nurse 2 eSignature: Electronically signed by Chanelle Dickinson RN on 5/25/25 at 9:50 AM EDT   
Occupational Therapy    [facility] - Rehabilitation Department       Occupational Therapy  3201/3201-01  [facility] 3 PROGRESSIVE CARE    [x] Initial Evaluation            [x] Daily Treatment Note         [] Discharge Summary      Patient: Viji Gamez   : 1950   MRN: [id number]   Date of Service:  2025  Discharge Recommendations: SNF vs Home with initial 24hr SPV/A and HHOT     AM-PAC Inpatient Daily Activity Raw Score: 18    DME Required For Discharge: patient has all required DME for discharge, DME to be determined at next level of care    Therapy discharge recommendations take into account each patient's current medical complexities and are subject to input/oversight from the patient's healthcare team.     Barriers to Home Discharge:   [] Steps to access home entry or bed/bath:   [x] Unable to transfer, ambulate, or propel wheelchair household distances without assist   [x] Limited available assist at home upon discharge    [] Patient or family requests d/c to post-acute facility    [x] Poor cognition/safety awareness for d/c to home alone    []Unable to maintain ordered weight bearing status    [] Patient with salient signs of long-standing immobility   [x] Patient is at risk for falls due to: decreased balance, decreased functional activity tolerance, decreased ADL status   [] Other:    If pt is unable to be seen after this session, please let this note serve as discharge summary.  Please see case management note for discharge disposition.  Thank you.  Admitting Diagnosis:  Headache  Referring Physician:   Ca Reynolds MD       Current Admission Summary: per H&P from Dr. Reynolds :  \"CHIEF COMPLAINT: Headache        HISTORY OF PRESENT ILLNESS:   The patient Viji Gamez is a 75 y.o.female with history of For COPD atrial fibrillation patient has watchman in place, diabetes mellitus coronary artery disease status post PCI.  Patient also has a history of CVAs in the past and a 
Patient admitted to room 3201 from ED. Report received from Annie RN. Patient awake and alert. Vitals obtained. CHG and head to toe completed. Patient c/o 10/10 headache, Dr. Reynolds aware. Pt oriented to room, call light and surroundings. Bed locked in lowest position with bed alarm on.   
Patient discharged the unit via w/c by this RN. Patient picked up by her sister She. Tele removed. Patient required minimal assistance to get dressed. AVS provided to and reviewed with patient. All questions and concerns addressed at discharge. Patient instructed to contact the hospital tomorrow if she doesn't hear from [facility]. Patient received all belongings.   
notified.  Pt states she is on 3 L home O2, but no O2 here.  Objective:   General:   [x]cleared by Nursing for therapy treatment  [x]Patient on arrival: [x]in bed, []in chair.   Pain: 6/10.  Location: L SI joint  Pain Interventions: RN notified of patient request for pain medication, heat applied, and repositioned        Vitals/Activity Tolerance  Pt Position BP (mmHg) HR (bpm) SpO2 (%) on RA  Comments   Supine at rest 135/94 83 93%    Seated at EOB       Standing              End of session   87% Pt states she is on home O2.  Nsg notified and pt placed on 2 L O2.  O2 sats then up to 96%.   Lines, Drains, & Tubes: Purewick External Catheter  During therapy session noted pt with pain  desaturation of O2.  Functional Mobility   Bed Mobility:  Supine >Sit:supervision  Scooting: supervision  Comments:   Transfers:  Sit<>Stand with up to stand by assistance  Comments: Required   Ambulation on Level surfaces :   Assistance required stand by assistance  Assistive Device rolling walker  Other Appliance   Distance in feet : 30 feet and 25 feet  Gait Mechanics: Decreased B steplength and height  Comments:         Balance  SITTING BALANCE  [] Patient requires use of bed rail for static/dynamic sitting   [] Patient requires assist for static/dynamic sitting  with assist  up to  [x] Patient able to complete in room static and dynamic sitting balance activity independently without assistive device.   Comments:   STANDING BALANCE  [x] Patient requires use of assistive device  for static/dynamic standing   [] Patient requires assist for static/dynamic standing with assist  up to   [] Patient able to complete in room static and dynamic standing balance activity independently without assistive device.   Comments:     Other Therapeutic Interventions  Pt requested use of toilet.  Pt able to perform toileting tasks with SBA.  Pt issued clean gown and pull ups.      Functional Outcomes  AM-PAC Inpatient Mobility Raw Score : 19

## 2025-05-26 NOTE — CARE COORDINATION
Pt chose [facility]. Pt has been accepted by [facility].They will reach out to the pt tomorrow [id number] to set up HH services.

## 2025-05-26 NOTE — PLAN OF CARE
Problem: Chronic Conditions and Co-morbidities  Goal: Patient's chronic conditions and co-morbidity symptoms are monitored and maintained or improved  5/26/2025 1247 by Kalpana Artis RN  Outcome: Completed  5/26/2025 0534 by Shiloh Esparza RN  Outcome: Progressing  5/26/2025 0533 by Shiloh Esparza RN  Outcome: Progressing     Problem: Discharge Planning  Goal: Discharge to home or other facility with appropriate resources  5/26/2025 1247 by Kalpana Artis RN  Outcome: Completed  5/26/2025 0534 by Shiloh Esparza RN  Outcome: Progressing  5/26/2025 0533 by Shiloh Esparza RN  Outcome: Progressing     Problem: Pain  Goal: Verbalizes/displays adequate comfort level or baseline comfort level  5/26/2025 1247 by Kalpana Artis RN  Outcome: Completed  5/26/2025 0534 by Shiloh Esparza RN  Outcome: Progressing  5/26/2025 0533 by Shiloh Esparza RN  Outcome: Progressing     Problem: Safety - Adult  Goal: Free from fall injury  5/26/2025 1247 by Kalpana Artis RN  Outcome: Completed  5/26/2025 0534 by Shiloh Esparza RN  Outcome: Progressing  5/26/2025 0533 by Shiloh Esparza RN  Outcome: Progressing

## 2025-05-26 NOTE — PLAN OF CARE
Problem: Chronic Conditions and Co-morbidities  Goal: Patient's chronic conditions and co-morbidity symptoms are monitored and maintained or improved  5/26/2025 0534 by Shiloh Esparza RN  Outcome: Progressing  5/26/2025 0533 by Shiloh Esparza RN  Outcome: Progressing     Problem: Discharge Planning  Goal: Discharge to home or other facility with appropriate resources  5/26/2025 0534 by Shiloh Esparza RN  Outcome: Progressing  5/26/2025 0533 by Shiloh Esparza RN  Outcome: Progressing     Problem: Pain  Goal: Verbalizes/displays adequate comfort level or baseline comfort level  5/26/2025 0534 by Shiloh Esparza RN  Outcome: Progressing  5/26/2025 0533 by Shiloh Esparza RN  Outcome: Progressing     Problem: Safety - Adult  Goal: Free from fall injury  5/26/2025 0534 by Shiloh Esparza RN  Outcome: Progressing  5/26/2025 0533 by Shiloh Esparza RN  Outcome: Progressing

## 2025-05-29 LAB
EKG DIAGNOSIS: NORMAL
EKG Q-T INTERVAL: 336 MS
EKG QRS DURATION: 66 MS
EKG QTC CALCULATION (BAZETT): 460 MS
EKG R AXIS: 49 DEGREES
EKG T AXIS: 64 DEGREES
EKG VENTRICULAR RATE: 113 BPM

## 2025-07-06 ENCOUNTER — HOSPITAL ENCOUNTER (OUTPATIENT)
Age: 75
Setting detail: OBSERVATION
Discharge: HOME | End: 2025-07-09
Attending: EMERGENCY MEDICINE | Admitting: STUDENT IN AN ORGANIZED HEALTH CARE EDUCATION/TRAINING PROGRAM
Payer: COMMERCIAL

## 2025-07-06 ENCOUNTER — APPOINTMENT (OUTPATIENT)
Age: 75
End: 2025-07-06
Payer: COMMERCIAL

## 2025-07-06 DIAGNOSIS — I31.39 PERICARDIAL EFFUSION: ICD-10-CM

## 2025-07-06 DIAGNOSIS — R06.00 DYSPNEA, UNSPECIFIED TYPE: ICD-10-CM

## 2025-07-06 DIAGNOSIS — R06.02 SHORTNESS OF BREATH: Primary | ICD-10-CM

## 2025-07-06 LAB
ALBUMIN SERPL-MCNC: 3.8 G/DL (ref 3.4–5)
ALBUMIN/GLOB SERPL: 1.3 {RATIO}
ALP SERPL-CCNC: 100 U/L (ref 40–129)
ALT SERPL-CCNC: 12 U/L (ref 10–40)
ANION GAP SERPL CALCULATED.3IONS-SCNC: 17 MMOL/L (ref 3–16)
AST SERPL-CCNC: 23 U/L (ref 15–37)
BASOPHILS # BLD: 0.06 K/UL (ref 0–0.2)
BASOPHILS NFR BLD: 1 %
BILIRUB SERPL-MCNC: <0.2 MG/DL (ref 0–1)
BNP SERPL-MCNC: 413 PG/ML (ref 0–124)
BUN SERPL-MCNC: 20 MG/DL (ref 7–20)
CALCIUM SERPL-MCNC: 9.1 MG/DL (ref 8.3–10.6)
CHLORIDE SERPL-SCNC: 98 MMOL/L (ref 99–110)
CO2 SERPL-SCNC: 22 MMOL/L (ref 21–32)
COHGB MFR BLD: 1 % (ref 0–1.5)
CREAT SERPL-MCNC: 0.6 MG/DL (ref 0.6–1.2)
EOSINOPHIL # BLD: 0.65 K/UL (ref 0–0.6)
EOSINOPHILS RELATIVE PERCENT: 10 %
ERYTHROCYTE [DISTWIDTH] IN BLOOD BY AUTOMATED COUNT: 12.7 % (ref 12.4–15.4)
GFR, ESTIMATED: >90 ML/MIN/1.73M2
GLUCOSE SERPL-MCNC: 141 MG/DL (ref 70–99)
HCO3 VENOUS: 26.8 MMOL/L (ref 23–29)
HCT VFR BLD AUTO: 37.5 % (ref 36–48)
HGB BLD-MCNC: 12.1 G/DL (ref 12–16)
IMM GRANULOCYTES # BLD AUTO: 0.05 K/UL (ref 0–0.5)
IMM GRANULOCYTES NFR BLD: 1 %
LYMPHOCYTES NFR BLD: 1.27 K/UL (ref 1–5.1)
LYMPHOCYTES RELATIVE PERCENT: 19 %
MCH RBC QN AUTO: 28.4 PG (ref 26–34)
MCHC RBC AUTO-ENTMCNC: 32.3 G/DL (ref 31–36)
MCV RBC AUTO: 88 FL (ref 80–100)
METHEMOGLOBIN: 0.1 % (ref 0–1.4)
MONOCYTES NFR BLD: 0.78 K/UL (ref 0–1.3)
MONOCYTES NFR BLD: 12 %
NEUTROPHILS NFR BLD: 58 %
NEUTS SEG NFR BLD: 3.91 K/UL (ref 1.7–7.7)
O2 SAT, VEN: 97.3 %
PCO2 VENOUS: 40.5 MM HG (ref 40–50)
PH VENOUS: 7.44 (ref 7.35–7.45)
PLATELET # BLD AUTO: 318 K/UL (ref 135–450)
PMV BLD AUTO: 10.6 FL (ref 9.4–12.4)
PO2 VENOUS: 109.9 MM HG
POSITIVE BASE EXCESS, VEN: 2.5 MMOL/L (ref 0–3)
POTASSIUM SERPL-SCNC: 4 MMOL/L (ref 3.5–5.1)
PROCALCITONIN SERPL-MCNC: 0.03 NG/ML (ref 0–0.15)
PROT SERPL-MCNC: 6.8 G/DL (ref 6.4–8.2)
RBC # BLD AUTO: 4.26 M/UL (ref 4–5.2)
SODIUM SERPL-SCNC: 137 MMOL/L (ref 136–145)
TROPONIN I SERPL HS-MCNC: 9 NG/L (ref 0–14)
WBC OTHER # BLD: 6.7 K/UL (ref 4–11)

## 2025-07-06 PROCEDURE — 93005 ELECTROCARDIOGRAM TRACING: CPT | Performed by: EMERGENCY MEDICINE

## 2025-07-06 PROCEDURE — 2700000000 HC OXYGEN THERAPY PER DAY

## 2025-07-06 PROCEDURE — 99285 EMERGENCY DEPT VISIT HI MDM: CPT

## 2025-07-06 PROCEDURE — 84484 ASSAY OF TROPONIN QUANT: CPT

## 2025-07-06 PROCEDURE — 84145 PROCALCITONIN (PCT): CPT

## 2025-07-06 PROCEDURE — 80053 COMPREHEN METABOLIC PANEL: CPT

## 2025-07-06 PROCEDURE — 94640 AIRWAY INHALATION TREATMENT: CPT

## 2025-07-06 PROCEDURE — 83880 ASSAY OF NATRIURETIC PEPTIDE: CPT

## 2025-07-06 PROCEDURE — 36415 COLL VENOUS BLD VENIPUNCTURE: CPT

## 2025-07-06 PROCEDURE — 6370000000 HC RX 637 (ALT 250 FOR IP): Performed by: EMERGENCY MEDICINE

## 2025-07-06 PROCEDURE — 71045 X-RAY EXAM CHEST 1 VIEW: CPT

## 2025-07-06 PROCEDURE — 82805 BLOOD GASES W/O2 SATURATION: CPT

## 2025-07-06 PROCEDURE — 71260 CT THORAX DX C+: CPT

## 2025-07-06 PROCEDURE — 94761 N-INVAS EAR/PLS OXIMETRY MLT: CPT

## 2025-07-06 PROCEDURE — 6360000004 HC RX CONTRAST MEDICATION: Performed by: EMERGENCY MEDICINE

## 2025-07-06 PROCEDURE — 85025 COMPLETE CBC W/AUTO DIFF WBC: CPT

## 2025-07-06 RX ORDER — IOPAMIDOL 755 MG/ML
75 INJECTION, SOLUTION INTRAVASCULAR
Status: COMPLETED | OUTPATIENT
Start: 2025-07-06 | End: 2025-07-06

## 2025-07-06 RX ORDER — IPRATROPIUM BROMIDE AND ALBUTEROL SULFATE 2.5; .5 MG/3ML; MG/3ML
1 SOLUTION RESPIRATORY (INHALATION) ONCE
Status: COMPLETED | OUTPATIENT
Start: 2025-07-06 | End: 2025-07-06

## 2025-07-06 RX ADMIN — IOPAMIDOL 75 ML: 755 INJECTION, SOLUTION INTRAVENOUS at 23:56

## 2025-07-06 RX ADMIN — IPRATROPIUM BROMIDE AND ALBUTEROL SULFATE 1 DOSE: .5; 2.5 SOLUTION RESPIRATORY (INHALATION) at 22:54

## 2025-07-06 ASSESSMENT — PAIN SCALES - GENERAL: PAINLEVEL_OUTOF10: 5

## 2025-07-06 ASSESSMENT — PAIN DESCRIPTION - PAIN TYPE
TYPE: CHRONIC PAIN
TYPE: ACUTE PAIN

## 2025-07-06 ASSESSMENT — LIFESTYLE VARIABLES
HOW OFTEN DO YOU HAVE A DRINK CONTAINING ALCOHOL: MONTHLY OR LESS
HOW MANY STANDARD DRINKS CONTAINING ALCOHOL DO YOU HAVE ON A TYPICAL DAY: 1 OR 2

## 2025-07-06 ASSESSMENT — PAIN DESCRIPTION - ORIENTATION: ORIENTATION: RIGHT;LEFT

## 2025-07-06 ASSESSMENT — PAIN - FUNCTIONAL ASSESSMENT: PAIN_FUNCTIONAL_ASSESSMENT: 0-10

## 2025-07-06 ASSESSMENT — PAIN DESCRIPTION - LOCATION: LOCATION: RIB CAGE

## 2025-07-06 ASSESSMENT — PAIN DESCRIPTION - DESCRIPTORS: DESCRIPTORS: ACHING

## 2025-07-07 ENCOUNTER — APPOINTMENT (OUTPATIENT)
Age: 75
End: 2025-07-07
Attending: STUDENT IN AN ORGANIZED HEALTH CARE EDUCATION/TRAINING PROGRAM
Payer: COMMERCIAL

## 2025-07-07 ENCOUNTER — APPOINTMENT (OUTPATIENT)
Age: 75
End: 2025-07-07
Payer: COMMERCIAL

## 2025-07-07 PROBLEM — I31.39 PERICARDIAL EFFUSION: Status: ACTIVE | Noted: 2025-07-07

## 2025-07-07 LAB
ALBUMIN SERPL-MCNC: 3.6 G/DL (ref 3.4–5)
ALBUMIN/GLOB SERPL: 1.3 {RATIO}
ALP SERPL-CCNC: 94 U/L (ref 40–129)
ALT SERPL-CCNC: 9 U/L (ref 10–40)
ANION GAP SERPL CALCULATED.3IONS-SCNC: 12 MMOL/L (ref 3–16)
AST SERPL-CCNC: 17 U/L (ref 15–37)
BILIRUB SERPL-MCNC: 0.3 MG/DL (ref 0–1)
BUN SERPL-MCNC: 15 MG/DL (ref 7–20)
CALCIUM SERPL-MCNC: 8.8 MG/DL (ref 8.3–10.6)
CHLORIDE SERPL-SCNC: 100 MMOL/L (ref 99–110)
CO2 SERPL-SCNC: 25 MMOL/L (ref 21–32)
CREAT SERPL-MCNC: 0.6 MG/DL (ref 0.6–1.2)
CRP SERPL HS-MCNC: 1.1 MG/L (ref 0–5.1)
ECHO BSA: 1.58 M2
ECHO EST RA PRESSURE: 8 MMHG
ECHO LA AREA 2C: 24.9 CM2
ECHO LA AREA 4C: 24.8 CM2
ECHO LA MAJOR AXIS: 5.6 CM
ECHO LA MINOR AXIS: 5.9 CM
ECHO LA VOL BP: 91 ML (ref 22–52)
ECHO LA VOL MOD A2C: 87 ML (ref 22–52)
ECHO LA VOL MOD A4C: 90 ML (ref 22–52)
ECHO LA VOL/BSA BIPLANE: 59 ML/M2 (ref 16–34)
ECHO LA VOLUME INDEX MOD A2C: 56 ML/M2 (ref 16–34)
ECHO LA VOLUME INDEX MOD A4C: 58 ML/M2 (ref 16–34)
ECHO LV E' LATERAL VELOCITY: 6.96 CM/S
ECHO LV E' SEPTAL VELOCITY: 8.05 CM/S
ECHO LV EDV A2C: 71 ML
ECHO LV EDV A4C: 68 ML
ECHO LV EDV INDEX A4C: 44 ML/M2
ECHO LV EDV NDEX A2C: 46 ML/M2
ECHO LV EF PHYSICIAN: 70 %
ECHO LV EJECTION FRACTION A2C: 65 %
ECHO LV EJECTION FRACTION A4C: 66 %
ECHO LV EJECTION FRACTION BIPLANE: 66 % (ref 55–100)
ECHO LV ESV A2C: 25 ML
ECHO LV ESV A4C: 23 ML
ECHO LV ESV INDEX A2C: 16 ML/M2
ECHO LV ESV INDEX A4C: 15 ML/M2
ECHO MV E VELOCITY: 0.81 M/S
ECHO MV E/E' LATERAL: 11.64
ECHO MV E/E' RATIO (AVERAGED): 10.85
ECHO MV E/E' SEPTAL: 10.06
ECHO RA AREA 4C: 20.8 CM2
ECHO RA END SYSTOLIC VOLUME APICAL 4 CHAMBER INDEX BSA: 40 ML/M2
ECHO RA VOLUME: 62 ML
ECHO RIGHT VENTRICULAR SYSTOLIC PRESSURE (RVSP): 31 MMHG
ECHO RV BASAL DIMENSION: 4 CM
ECHO RV FREE WALL PEAK S': 7.1 CM/S
ECHO RV LONGITUDINAL DIMENSION: 6.6 CM
ECHO RV MID DIMENSION: 2.7 CM
ECHO RV TAPSE: 0.9 CM (ref 1.7–?)
ECHO TV REGURGITANT MAX VELOCITY: 2.41 M/S
ECHO TV REGURGITANT PEAK GRADIENT: 23 MMHG
ERYTHROCYTE [SEDIMENTATION RATE] IN BLOOD BY WESTERGREN METHOD: 9 MM/HR (ref 0–30)
GFR, ESTIMATED: >90 ML/MIN/1.73M2
GLUCOSE BLD-MCNC: 144 MG/DL (ref 70–99)
GLUCOSE BLD-MCNC: 144 MG/DL (ref 70–99)
GLUCOSE BLD-MCNC: 251 MG/DL (ref 70–99)
GLUCOSE SERPL-MCNC: 151 MG/DL (ref 70–99)
POTASSIUM SERPL-SCNC: 4.1 MMOL/L (ref 3.5–5.1)
PROT SERPL-MCNC: 6.5 G/DL (ref 6.4–8.2)
SODIUM SERPL-SCNC: 137 MMOL/L (ref 136–145)
TROPONIN I SERPL HS-MCNC: 9 NG/L (ref 0–14)
TSH SERPL DL<=0.05 MIU/L-ACNC: 2.28 UIU/ML (ref 0.27–4.2)

## 2025-07-07 PROCEDURE — 76705 ECHO EXAM OF ABDOMEN: CPT

## 2025-07-07 PROCEDURE — 6360000002 HC RX W HCPCS: Performed by: STUDENT IN AN ORGANIZED HEALTH CARE EDUCATION/TRAINING PROGRAM

## 2025-07-07 PROCEDURE — 86140 C-REACTIVE PROTEIN: CPT

## 2025-07-07 PROCEDURE — G0378 HOSPITAL OBSERVATION PER HR: HCPCS

## 2025-07-07 PROCEDURE — 85652 RBC SED RATE AUTOMATED: CPT

## 2025-07-07 PROCEDURE — 2500000003 HC RX 250 WO HCPCS: Performed by: STUDENT IN AN ORGANIZED HEALTH CARE EDUCATION/TRAINING PROGRAM

## 2025-07-07 PROCEDURE — 6370000000 HC RX 637 (ALT 250 FOR IP): Performed by: STUDENT IN AN ORGANIZED HEALTH CARE EDUCATION/TRAINING PROGRAM

## 2025-07-07 PROCEDURE — 6360000004 HC RX CONTRAST MEDICATION: Performed by: STUDENT IN AN ORGANIZED HEALTH CARE EDUCATION/TRAINING PROGRAM

## 2025-07-07 PROCEDURE — 96372 THER/PROPH/DIAG INJ SC/IM: CPT

## 2025-07-07 PROCEDURE — 93306 TTE W/DOPPLER COMPLETE: CPT | Performed by: INTERNAL MEDICINE

## 2025-07-07 PROCEDURE — 80053 COMPREHEN METABOLIC PANEL: CPT

## 2025-07-07 PROCEDURE — 94640 AIRWAY INHALATION TREATMENT: CPT

## 2025-07-07 PROCEDURE — 6370000000 HC RX 637 (ALT 250 FOR IP): Performed by: NURSE PRACTITIONER

## 2025-07-07 PROCEDURE — 84484 ASSAY OF TROPONIN QUANT: CPT

## 2025-07-07 PROCEDURE — 94761 N-INVAS EAR/PLS OXIMETRY MLT: CPT

## 2025-07-07 PROCEDURE — 82962 GLUCOSE BLOOD TEST: CPT

## 2025-07-07 PROCEDURE — 99222 1ST HOSP IP/OBS MODERATE 55: CPT | Performed by: INTERNAL MEDICINE

## 2025-07-07 PROCEDURE — 93321 DOPPLER ECHO F-UP/LMTD STD: CPT

## 2025-07-07 PROCEDURE — 36415 COLL VENOUS BLD VENIPUNCTURE: CPT

## 2025-07-07 PROCEDURE — 2700000000 HC OXYGEN THERAPY PER DAY

## 2025-07-07 PROCEDURE — 84443 ASSAY THYROID STIM HORMONE: CPT

## 2025-07-07 RX ORDER — AMLODIPINE BESYLATE 5 MG/1
5 TABLET ORAL DAILY
Status: DISCONTINUED | OUTPATIENT
Start: 2025-07-07 | End: 2025-07-09 | Stop reason: HOSPADM

## 2025-07-07 RX ORDER — CLONAZEPAM 1 MG/1
1 TABLET ORAL DAILY
Status: DISCONTINUED | OUTPATIENT
Start: 2025-07-07 | End: 2025-07-07

## 2025-07-07 RX ORDER — VENLAFAXINE HYDROCHLORIDE 37.5 MG/1
37.5 CAPSULE, EXTENDED RELEASE ORAL
Status: DISCONTINUED | OUTPATIENT
Start: 2025-07-07 | End: 2025-07-09 | Stop reason: HOSPADM

## 2025-07-07 RX ORDER — ACETAMINOPHEN 650 MG/1
650 SUPPOSITORY RECTAL EVERY 6 HOURS PRN
Status: DISCONTINUED | OUTPATIENT
Start: 2025-07-07 | End: 2025-07-09 | Stop reason: HOSPADM

## 2025-07-07 RX ORDER — CLONAZEPAM 1 MG/1
1 TABLET ORAL 2 TIMES DAILY
Status: DISCONTINUED | OUTPATIENT
Start: 2025-07-07 | End: 2025-07-09 | Stop reason: HOSPADM

## 2025-07-07 RX ORDER — ALBUTEROL SULFATE 0.83 MG/ML
2.5 SOLUTION RESPIRATORY (INHALATION) EVERY 6 HOURS PRN
Status: DISCONTINUED | OUTPATIENT
Start: 2025-07-07 | End: 2025-07-07

## 2025-07-07 RX ORDER — PANTOPRAZOLE SODIUM 40 MG/1
40 TABLET, DELAYED RELEASE ORAL DAILY
Status: DISCONTINUED | OUTPATIENT
Start: 2025-07-07 | End: 2025-07-09 | Stop reason: HOSPADM

## 2025-07-07 RX ORDER — FERROUS SULFATE 325(65) MG
325 TABLET ORAL
Status: DISCONTINUED | OUTPATIENT
Start: 2025-07-07 | End: 2025-07-09 | Stop reason: HOSPADM

## 2025-07-07 RX ORDER — SODIUM CHLORIDE 9 MG/ML
INJECTION, SOLUTION INTRAVENOUS PRN
Status: DISCONTINUED | OUTPATIENT
Start: 2025-07-07 | End: 2025-07-09 | Stop reason: HOSPADM

## 2025-07-07 RX ORDER — ASPIRIN 81 MG/1
81 TABLET ORAL DAILY
Status: DISCONTINUED | OUTPATIENT
Start: 2025-07-07 | End: 2025-07-09 | Stop reason: HOSPADM

## 2025-07-07 RX ORDER — MAGNESIUM SULFATE IN WATER 40 MG/ML
2000 INJECTION, SOLUTION INTRAVENOUS PRN
Status: DISCONTINUED | OUTPATIENT
Start: 2025-07-07 | End: 2025-07-09 | Stop reason: HOSPADM

## 2025-07-07 RX ORDER — POTASSIUM CHLORIDE 7.45 MG/ML
10 INJECTION INTRAVENOUS PRN
Status: DISCONTINUED | OUTPATIENT
Start: 2025-07-07 | End: 2025-07-09 | Stop reason: HOSPADM

## 2025-07-07 RX ORDER — GLUCAGON 1 MG/ML
1 KIT INJECTION PRN
Status: DISCONTINUED | OUTPATIENT
Start: 2025-07-07 | End: 2025-07-09 | Stop reason: HOSPADM

## 2025-07-07 RX ORDER — ROPINIROLE 0.25 MG/1
0.25 TABLET, FILM COATED ORAL 2 TIMES DAILY
Status: DISCONTINUED | OUTPATIENT
Start: 2025-07-07 | End: 2025-07-09 | Stop reason: HOSPADM

## 2025-07-07 RX ORDER — OXYCODONE AND ACETAMINOPHEN 5; 325 MG/1; MG/1
1 TABLET ORAL EVERY 8 HOURS PRN
Refills: 0 | Status: DISCONTINUED | OUTPATIENT
Start: 2025-07-07 | End: 2025-07-09 | Stop reason: HOSPADM

## 2025-07-07 RX ORDER — POTASSIUM CHLORIDE 1500 MG/1
40 TABLET, EXTENDED RELEASE ORAL PRN
Status: DISCONTINUED | OUTPATIENT
Start: 2025-07-07 | End: 2025-07-09 | Stop reason: HOSPADM

## 2025-07-07 RX ORDER — DONEPEZIL HYDROCHLORIDE 5 MG/1
5 TABLET, FILM COATED ORAL NIGHTLY
Status: DISCONTINUED | OUTPATIENT
Start: 2025-07-07 | End: 2025-07-09 | Stop reason: HOSPADM

## 2025-07-07 RX ORDER — INSULIN LISPRO 100 [IU]/ML
0-8 INJECTION, SOLUTION INTRAVENOUS; SUBCUTANEOUS EVERY 6 HOURS SCHEDULED
Status: DISCONTINUED | OUTPATIENT
Start: 2025-07-07 | End: 2025-07-08

## 2025-07-07 RX ORDER — GABAPENTIN 400 MG/1
400 CAPSULE ORAL 2 TIMES DAILY
Status: DISCONTINUED | OUTPATIENT
Start: 2025-07-07 | End: 2025-07-09 | Stop reason: HOSPADM

## 2025-07-07 RX ORDER — ONDANSETRON 2 MG/ML
4 INJECTION INTRAMUSCULAR; INTRAVENOUS EVERY 6 HOURS PRN
Status: DISCONTINUED | OUTPATIENT
Start: 2025-07-07 | End: 2025-07-09 | Stop reason: HOSPADM

## 2025-07-07 RX ORDER — FUROSEMIDE 20 MG/1
20 TABLET ORAL DAILY
Status: DISCONTINUED | OUTPATIENT
Start: 2025-07-07 | End: 2025-07-09 | Stop reason: HOSPADM

## 2025-07-07 RX ORDER — SODIUM CHLORIDE 0.9 % (FLUSH) 0.9 %
5-40 SYRINGE (ML) INJECTION EVERY 12 HOURS SCHEDULED
Status: DISCONTINUED | OUTPATIENT
Start: 2025-07-07 | End: 2025-07-09 | Stop reason: HOSPADM

## 2025-07-07 RX ORDER — ACETAMINOPHEN 325 MG/1
650 TABLET ORAL EVERY 6 HOURS PRN
Status: DISCONTINUED | OUTPATIENT
Start: 2025-07-07 | End: 2025-07-09 | Stop reason: HOSPADM

## 2025-07-07 RX ORDER — METOPROLOL TARTRATE 25 MG/1
25 TABLET, FILM COATED ORAL 2 TIMES DAILY
Status: DISCONTINUED | OUTPATIENT
Start: 2025-07-07 | End: 2025-07-09 | Stop reason: HOSPADM

## 2025-07-07 RX ORDER — LISINOPRIL 10 MG/1
10 TABLET ORAL DAILY
Status: DISCONTINUED | OUTPATIENT
Start: 2025-07-07 | End: 2025-07-09 | Stop reason: HOSPADM

## 2025-07-07 RX ORDER — SODIUM CHLORIDE 0.9 % (FLUSH) 0.9 %
5-40 SYRINGE (ML) INJECTION PRN
Status: DISCONTINUED | OUTPATIENT
Start: 2025-07-07 | End: 2025-07-09 | Stop reason: HOSPADM

## 2025-07-07 RX ORDER — POLYETHYLENE GLYCOL 3350 17 G/17G
17 POWDER, FOR SOLUTION ORAL DAILY PRN
Status: DISCONTINUED | OUTPATIENT
Start: 2025-07-07 | End: 2025-07-09 | Stop reason: HOSPADM

## 2025-07-07 RX ORDER — IPRATROPIUM BROMIDE AND ALBUTEROL SULFATE 2.5; .5 MG/3ML; MG/3ML
1 SOLUTION RESPIRATORY (INHALATION) EVERY 6 HOURS PRN
Status: DISCONTINUED | OUTPATIENT
Start: 2025-07-07 | End: 2025-07-09 | Stop reason: HOSPADM

## 2025-07-07 RX ORDER — LEVETIRACETAM 500 MG/1
500 TABLET ORAL 2 TIMES DAILY
Status: DISCONTINUED | OUTPATIENT
Start: 2025-07-07 | End: 2025-07-09 | Stop reason: HOSPADM

## 2025-07-07 RX ORDER — DEXTROSE MONOHYDRATE 100 MG/ML
INJECTION, SOLUTION INTRAVENOUS CONTINUOUS PRN
Status: DISCONTINUED | OUTPATIENT
Start: 2025-07-07 | End: 2025-07-09 | Stop reason: HOSPADM

## 2025-07-07 RX ORDER — ONDANSETRON 4 MG/1
4 TABLET, ORALLY DISINTEGRATING ORAL EVERY 8 HOURS PRN
Status: DISCONTINUED | OUTPATIENT
Start: 2025-07-07 | End: 2025-07-09 | Stop reason: HOSPADM

## 2025-07-07 RX ORDER — ROSUVASTATIN CALCIUM 40 MG/1
40 TABLET, COATED ORAL EVERY EVENING
Status: DISCONTINUED | OUTPATIENT
Start: 2025-07-07 | End: 2025-07-09 | Stop reason: HOSPADM

## 2025-07-07 RX ORDER — ENOXAPARIN SODIUM 100 MG/ML
40 INJECTION SUBCUTANEOUS DAILY
Status: DISCONTINUED | OUTPATIENT
Start: 2025-07-07 | End: 2025-07-09 | Stop reason: HOSPADM

## 2025-07-07 RX ADMIN — VENLAFAXINE HYDROCHLORIDE 37.5 MG: 37.5 CAPSULE, EXTENDED RELEASE ORAL at 08:32

## 2025-07-07 RX ADMIN — OXYCODONE HYDROCHLORIDE AND ACETAMINOPHEN 1 TABLET: 5; 325 TABLET ORAL at 04:03

## 2025-07-07 RX ADMIN — CLONAZEPAM 1 MG: 1 TABLET ORAL at 21:23

## 2025-07-07 RX ADMIN — SODIUM CHLORIDE, PRESERVATIVE FREE 10 ML: 5 INJECTION INTRAVENOUS at 21:23

## 2025-07-07 RX ADMIN — GABAPENTIN 400 MG: 400 CAPSULE ORAL at 21:23

## 2025-07-07 RX ADMIN — ASPIRIN 81 MG: 81 TABLET, COATED ORAL at 08:32

## 2025-07-07 RX ADMIN — ARFORMOTEROL TARTRATE: 15 SOLUTION RESPIRATORY (INHALATION) at 07:42

## 2025-07-07 RX ADMIN — TIOTROPIUM BROMIDE INHALATION SPRAY 5 MCG: 3.12 SPRAY, METERED RESPIRATORY (INHALATION) at 07:42

## 2025-07-07 RX ADMIN — LEVETIRACETAM 500 MG: 500 TABLET, FILM COATED ORAL at 21:23

## 2025-07-07 RX ADMIN — LEVETIRACETAM 500 MG: 500 TABLET, FILM COATED ORAL at 08:32

## 2025-07-07 RX ADMIN — METOPROLOL TARTRATE 25 MG: 25 TABLET, FILM COATED ORAL at 21:22

## 2025-07-07 RX ADMIN — SODIUM CHLORIDE, PRESERVATIVE FREE 10 ML: 5 INJECTION INTRAVENOUS at 08:41

## 2025-07-07 RX ADMIN — CLONAZEPAM 1 MG: 1 TABLET ORAL at 05:16

## 2025-07-07 RX ADMIN — GABAPENTIN 400 MG: 400 CAPSULE ORAL at 08:32

## 2025-07-07 RX ADMIN — OXYCODONE HYDROCHLORIDE AND ACETAMINOPHEN 1 TABLET: 5; 325 TABLET ORAL at 16:14

## 2025-07-07 RX ADMIN — METOPROLOL TARTRATE 25 MG: 25 TABLET, FILM COATED ORAL at 08:32

## 2025-07-07 RX ADMIN — FERROUS SULFATE TAB 325 MG (65 MG ELEMENTAL FE) 325 MG: 325 (65 FE) TAB at 08:32

## 2025-07-07 RX ADMIN — ENOXAPARIN SODIUM 40 MG: 100 INJECTION SUBCUTANEOUS at 08:32

## 2025-07-07 RX ADMIN — FUROSEMIDE 20 MG: 20 TABLET ORAL at 08:32

## 2025-07-07 RX ADMIN — ARFORMOTEROL TARTRATE: 15 SOLUTION RESPIRATORY (INHALATION) at 18:07

## 2025-07-07 RX ADMIN — ROPINIROLE HYDROCHLORIDE 0.25 MG: 0.25 TABLET, FILM COATED ORAL at 21:23

## 2025-07-07 RX ADMIN — SULFUR HEXAFLUORIDE 2 ML: 60.7; .19; .19 INJECTION, POWDER, LYOPHILIZED, FOR SUSPENSION INTRAVENOUS; INTRAVESICAL at 09:14

## 2025-07-07 RX ADMIN — DONEPEZIL HYDROCHLORIDE 5 MG: 5 TABLET, FILM COATED ORAL at 21:23

## 2025-07-07 RX ADMIN — PANTOPRAZOLE SODIUM 40 MG: 40 TABLET, DELAYED RELEASE ORAL at 05:16

## 2025-07-07 RX ADMIN — INSULIN LISPRO 4 UNITS: 100 INJECTION, SOLUTION INTRAVENOUS; SUBCUTANEOUS at 18:34

## 2025-07-07 RX ADMIN — ROPINIROLE HYDROCHLORIDE 0.25 MG: 0.25 TABLET, FILM COATED ORAL at 08:32

## 2025-07-07 RX ADMIN — ROSUVASTATIN 40 MG: 40 TABLET, FILM COATED ORAL at 18:31

## 2025-07-07 ASSESSMENT — PAIN SCALES - GENERAL
PAINLEVEL_OUTOF10: 6
PAINLEVEL_OUTOF10: 8
PAINLEVEL_OUTOF10: 6
PAINLEVEL_OUTOF10: 2
PAINLEVEL_OUTOF10: 6
PAINLEVEL_OUTOF10: 6

## 2025-07-07 ASSESSMENT — PAIN - FUNCTIONAL ASSESSMENT
PAIN_FUNCTIONAL_ASSESSMENT: PREVENTS OR INTERFERES SOME ACTIVE ACTIVITIES AND ADLS
PAIN_FUNCTIONAL_ASSESSMENT: ACTIVITIES ARE NOT PREVENTED
PAIN_FUNCTIONAL_ASSESSMENT: PREVENTS OR INTERFERES SOME ACTIVE ACTIVITIES AND ADLS

## 2025-07-07 ASSESSMENT — PAIN DESCRIPTION - LOCATION
LOCATION: SHOULDER
LOCATION: BACK;SHOULDER;ARM

## 2025-07-07 ASSESSMENT — PAIN DESCRIPTION - DESCRIPTORS
DESCRIPTORS: ACHING;DISCOMFORT
DESCRIPTORS: ACHING;DISCOMFORT
DESCRIPTORS: DISCOMFORT;ACHING

## 2025-07-07 ASSESSMENT — PAIN DESCRIPTION - PAIN TYPE
TYPE: ACUTE PAIN
TYPE: ACUTE PAIN

## 2025-07-07 ASSESSMENT — PAIN DESCRIPTION - FREQUENCY: FREQUENCY: CONTINUOUS

## 2025-07-07 ASSESSMENT — PAIN DESCRIPTION - ORIENTATION
ORIENTATION: RIGHT
ORIENTATION: RIGHT;MID
ORIENTATION: RIGHT

## 2025-07-07 ASSESSMENT — PAIN SCALES - WONG BAKER
WONGBAKER_NUMERICALRESPONSE: NO HURT

## 2025-07-07 ASSESSMENT — PAIN DESCRIPTION - ONSET: ONSET: GRADUAL

## 2025-07-07 NOTE — CARE COORDINATION
Review of chart for any potential discharge needs.   No needs identified for discharge intervention at this time.   MD and bedside RN, if needs arise please consult case management for discharge intervention.  CM will follow as needed       Pt from home. PT IPTA. Pt receives O2 through [employer]. Pts HHC from [employer].

## 2025-07-07 NOTE — H&P
emergency department physician    EKG interpreted personally and results rate controlled atrial fibrillation no ischemic changes similar to EKG prior    Imaging that was interpreted personally includes see HPI      History from:     patient    History of Present Illness:     Chief Complaint: Shortness of breath  Viji Gamez is a 75 y.o. female with pmh of peripheral neuropathy, anxiety, hypertension, RLS, hyperlipidemia, history of hip fracture, epilepsy on Keppra, COPD, chronic hypoxic respiratory failure on 3 L baseline, atrial fibrillation with watchman in place, DM, CAD status post PCI, CVA, dementia who presents with shortness of breath     Patient was seen in the emergency department with no visitors at bedside.  Patient comes from a skilled nursing community.  Reason the patient came to the emergency department today is because of his difficulty breathing.  This started July 5, 2025.  She also feels like she is putting on water weight especially in her abdomen and ankles although ankles look nonedematous today.  Patient has a known moderate pericardial effusion that was seen on echo June 13, 2025.  This echo was ordered by her primary care doctor and she was referred to cardiology but has not made that appointment yet.   She thinks her abdomen is much bigger than usual.  She is 130 pounds today she was 121 pounds back in May 2025 and was 117 pounds in June 2025.  She remains on her baseline 3 L nasal cannula.    Lab eval WBC 6.7, hemoglobin 12.1, venous blood gas 7.43, pCO2 40, proBNP 413, Pro-Jeet 0.03, troponin 9 x 2, sodium 137, potassium 4, bicarb 22, anion gap 17, glucose 141, creatinine 0.6, GFR greater than 90, calcium 9.1, ALT 12, AST 23, total bilirubin less than 0.2    Review of Systems:        Pertinent positives and negatives discussed in HPI     Objective:   No intake or output data in the 24 hours ending 07/07/25 0523   Vitals:   Vitals:    07/07/25 0100 07/07/25 0200 07/07/25 0237 07/07/25 0403

## 2025-07-07 NOTE — ED NOTES
ED TO INPATIENT SBAR HANDOFF    Patient Name: Viji Gamez   :  1950  75 y.o.   MRN:  [id number]  Preferred Name    ED Room #:  10/10  Family/Caregiver Present no   Restraints no   Sitter no   Sepsis Risk Score      Situation  Code Status: Prior No additional code details.    Allergies: Cymbalta [duloxetine hcl] and Sulfa antibiotics  Weight: Patient Vitals for the past 96 hrs (Last 3 readings):   Weight   25 2136 59 kg (130 lb)     Arrived from: home  Chief Complaint:   Chief Complaint   Patient presents with    Shortness of Breath     Hospital Problem/Diagnosis:  Active Problems:    * No active hospital problems. *  Resolved Problems:    * No resolved hospital problems. *    Imaging:   CT CHEST PULMONARY EMBOLISM W CONTRAST   Final Result      1. No evidence of pulmonary embolus.     2. Cardiomegaly with small to moderate pericardial effusion.      Electronically signed by Nikolai Strong MD      XR CHEST PORTABLE   Final Result   1. Cardiomegaly otherwise no acute abnormality.      Electronically signed by Jose Burns MD        Abnormal labs:   Abnormal Labs Reviewed   CBC WITH AUTO DIFFERENTIAL - Abnormal; Notable for the following components:       Result Value    Immature Granulocytes % 1 (*)     Eosinophils Absolute 0.65 (*)     All other components within normal limits   COMPREHENSIVE METABOLIC PANEL W/ REFLEX TO MG FOR LOW K - Abnormal; Notable for the following components:    Chloride 98 (*)     Anion Gap 17 (*)     Glucose 141 (*)     All other components within normal limits   BRAIN NATRIURETIC PEPTIDE - Abnormal; Notable for the following components:    NT Pro- (*)     All other components within normal limits     Critical values: no     Abnormal Assessment Findings:     Background  History:   Past Medical History:   Diagnosis Date    A-fib (HCC)     COPD (chronic obstructive pulmonary disease) (HCC)     Diabetes mellitus (HCC)     H/O heart artery stent     Hypertension

## 2025-07-07 NOTE — ED PROVIDER NOTES
[facility] EMERGENCY DEPARTMENT     EMERGENCY DEPARTMENT ENCOUNTER            Pt Name: Viji Gamez   MRN: [id number]   Birthdate 1950   Date of evaluation: 7/6/2025   Provider: Mukund Lopez DO   PCP: Azam De Luna MD   Note Started: 10:43 PM EDT 7/6/25          CHIEF COMPLAINT     Chief Complaint   Patient presents with    Shortness of Breath             HISTORY OF PRESENT ILLNESS:   History from : Patient   Limitations to history : None     Viji Gamez is a 75 y.o. female who presents emergency department with complaints of difficulty breathing lower extremity edema and abdominal bloating onset yesterday constant since that time worse prior to admission.  Symptoms are worse with walking and any exertion.  Patient denies chest pain abdominal pain back pain.  She does complain that chest \"feels full\" with inspiration.  She denies pain to the extremities.  Denies syncope near syncope fever.  Patient is on 3 L of oxygen at all times.    From our facility on 6/6/2025 following admission for CHF.     Patient with past medical history of atrial fibrillation with Watchman device COPD diabetes CAD hypertension    Nursing Notes were all reviewed and agreed with, or any disagreements were addressed in the HPI.     REVIEW OF SYSTEMS :    Positives and Pertinent negatives as per HPI.      MEDICAL HISTORY   has a past medical history of A-fib (HCC), COPD (chronic obstructive pulmonary disease) (HCC), Diabetes mellitus (HCC), H/O heart artery stent, Hypertension, and Presence of Watchman left atrial appendage closure device.    Past Surgical History:   Procedure Laterality Date    BACK SURGERY      CATARACT EXTRACTION        CURRENTMEDICATIONS       Previous Medications    ACETAMINOPHEN-BUTALBITAL  MG TABS    Take 1 tablet by mouth every 4 hours as needed for Headaches    ALBUTEROL SULFATE HFA (VENTOLIN HFA) 108 (90 BASE) MCG/ACT INHALER    Inhale 2 puffs into the lungs every 6 hours as

## 2025-07-08 PROBLEM — R06.02 SHORTNESS OF BREATH: Status: ACTIVE | Noted: 2025-07-08

## 2025-07-08 LAB
ALBUMIN SERPL-MCNC: 3.8 G/DL (ref 3.4–5)
ALBUMIN/GLOB SERPL: 1.3 {RATIO}
ALP SERPL-CCNC: 100 U/L (ref 40–129)
ALT SERPL-CCNC: 10 U/L (ref 10–40)
ANION GAP SERPL CALCULATED.3IONS-SCNC: 13 MMOL/L (ref 3–16)
AST SERPL-CCNC: 16 U/L (ref 15–37)
BASOPHILS # BLD: 0.07 K/UL (ref 0–0.2)
BASOPHILS NFR BLD: 1 %
BILIRUB SERPL-MCNC: <0.2 MG/DL (ref 0–1)
BUN SERPL-MCNC: 18 MG/DL (ref 7–20)
CALCIUM SERPL-MCNC: 8.9 MG/DL (ref 8.3–10.6)
CHLORIDE SERPL-SCNC: 101 MMOL/L (ref 99–110)
CO2 SERPL-SCNC: 25 MMOL/L (ref 21–32)
CREAT SERPL-MCNC: 0.6 MG/DL (ref 0.6–1.2)
EKG DIAGNOSIS: NORMAL
EKG Q-T INTERVAL: 360 MS
EKG QRS DURATION: 64 MS
EKG QTC CALCULATION (BAZETT): 428 MS
EKG R AXIS: 51 DEGREES
EKG T AXIS: 30 DEGREES
EKG VENTRICULAR RATE: 85 BPM
EOSINOPHIL # BLD: 0.93 K/UL (ref 0–0.6)
EOSINOPHILS RELATIVE PERCENT: 12 %
ERYTHROCYTE [DISTWIDTH] IN BLOOD BY AUTOMATED COUNT: 12.8 % (ref 12.4–15.4)
GFR, ESTIMATED: >90 ML/MIN/1.73M2
GLUCOSE BLD-MCNC: 146 MG/DL (ref 70–99)
GLUCOSE BLD-MCNC: 151 MG/DL (ref 70–99)
GLUCOSE BLD-MCNC: 156 MG/DL (ref 70–99)
GLUCOSE BLD-MCNC: 189 MG/DL (ref 70–99)
GLUCOSE BLD-MCNC: 235 MG/DL (ref 70–99)
GLUCOSE SERPL-MCNC: 154 MG/DL (ref 70–99)
HCT VFR BLD AUTO: 40.9 % (ref 36–48)
HGB BLD-MCNC: 12.8 G/DL (ref 12–16)
IMM GRANULOCYTES # BLD AUTO: 0.04 K/UL (ref 0–0.5)
IMM GRANULOCYTES NFR BLD: 1 %
LYMPHOCYTES NFR BLD: 1.26 K/UL (ref 1–5.1)
LYMPHOCYTES RELATIVE PERCENT: 16 %
MCH RBC QN AUTO: 28.4 PG (ref 26–34)
MCHC RBC AUTO-ENTMCNC: 31.3 G/DL (ref 31–36)
MCV RBC AUTO: 90.7 FL (ref 80–100)
MONOCYTES NFR BLD: 0.76 K/UL (ref 0–1.3)
MONOCYTES NFR BLD: 10 %
NEUTROPHILS NFR BLD: 60 %
NEUTS SEG NFR BLD: 4.6 K/UL (ref 1.7–7.7)
PLATELET # BLD AUTO: 305 K/UL (ref 135–450)
PMV BLD AUTO: 9.9 FL (ref 9.4–12.4)
POTASSIUM SERPL-SCNC: 4 MMOL/L (ref 3.5–5.1)
PROT SERPL-MCNC: 6.8 G/DL (ref 6.4–8.2)
RBC # BLD AUTO: 4.51 M/UL (ref 4–5.2)
SODIUM SERPL-SCNC: 138 MMOL/L (ref 136–145)
WBC OTHER # BLD: 7.7 K/UL (ref 4–11)

## 2025-07-08 PROCEDURE — 93010 ELECTROCARDIOGRAM REPORT: CPT | Performed by: INTERNAL MEDICINE

## 2025-07-08 PROCEDURE — 6370000000 HC RX 637 (ALT 250 FOR IP): Performed by: NURSE PRACTITIONER

## 2025-07-08 PROCEDURE — G0378 HOSPITAL OBSERVATION PER HR: HCPCS

## 2025-07-08 PROCEDURE — 2500000003 HC RX 250 WO HCPCS: Performed by: STUDENT IN AN ORGANIZED HEALTH CARE EDUCATION/TRAINING PROGRAM

## 2025-07-08 PROCEDURE — 36415 COLL VENOUS BLD VENIPUNCTURE: CPT

## 2025-07-08 PROCEDURE — 2700000000 HC OXYGEN THERAPY PER DAY

## 2025-07-08 PROCEDURE — 94761 N-INVAS EAR/PLS OXIMETRY MLT: CPT

## 2025-07-08 PROCEDURE — 94640 AIRWAY INHALATION TREATMENT: CPT

## 2025-07-08 PROCEDURE — 82962 GLUCOSE BLOOD TEST: CPT

## 2025-07-08 PROCEDURE — 6360000002 HC RX W HCPCS: Performed by: STUDENT IN AN ORGANIZED HEALTH CARE EDUCATION/TRAINING PROGRAM

## 2025-07-08 PROCEDURE — 96372 THER/PROPH/DIAG INJ SC/IM: CPT

## 2025-07-08 PROCEDURE — 6370000000 HC RX 637 (ALT 250 FOR IP): Performed by: STUDENT IN AN ORGANIZED HEALTH CARE EDUCATION/TRAINING PROGRAM

## 2025-07-08 PROCEDURE — 83036 HEMOGLOBIN GLYCOSYLATED A1C: CPT

## 2025-07-08 PROCEDURE — APPNB45 APP NON BILLABLE 31-45 MINUTES: Performed by: NURSE PRACTITIONER

## 2025-07-08 PROCEDURE — 85025 COMPLETE CBC W/AUTO DIFF WBC: CPT

## 2025-07-08 PROCEDURE — 80053 COMPREHEN METABOLIC PANEL: CPT

## 2025-07-08 RX ORDER — INSULIN LISPRO 100 [IU]/ML
0-4 INJECTION, SOLUTION INTRAVENOUS; SUBCUTANEOUS
Status: DISCONTINUED | OUTPATIENT
Start: 2025-07-08 | End: 2025-07-09 | Stop reason: HOSPADM

## 2025-07-08 RX ADMIN — ARFORMOTEROL TARTRATE: 15 SOLUTION RESPIRATORY (INHALATION) at 07:58

## 2025-07-08 RX ADMIN — VENLAFAXINE HYDROCHLORIDE 37.5 MG: 37.5 CAPSULE, EXTENDED RELEASE ORAL at 08:18

## 2025-07-08 RX ADMIN — METOPROLOL TARTRATE 25 MG: 25 TABLET, FILM COATED ORAL at 21:10

## 2025-07-08 RX ADMIN — ROPINIROLE HYDROCHLORIDE 0.25 MG: 0.25 TABLET, FILM COATED ORAL at 21:09

## 2025-07-08 RX ADMIN — GABAPENTIN 400 MG: 400 CAPSULE ORAL at 08:17

## 2025-07-08 RX ADMIN — LEVETIRACETAM 500 MG: 500 TABLET, FILM COATED ORAL at 08:17

## 2025-07-08 RX ADMIN — FUROSEMIDE 20 MG: 20 TABLET ORAL at 08:18

## 2025-07-08 RX ADMIN — ARFORMOTEROL TARTRATE: 15 SOLUTION RESPIRATORY (INHALATION) at 20:32

## 2025-07-08 RX ADMIN — INSULIN LISPRO 2 UNITS: 100 INJECTION, SOLUTION INTRAVENOUS; SUBCUTANEOUS at 00:50

## 2025-07-08 RX ADMIN — PANTOPRAZOLE SODIUM 40 MG: 40 TABLET, DELAYED RELEASE ORAL at 06:19

## 2025-07-08 RX ADMIN — CLONAZEPAM 1 MG: 1 TABLET ORAL at 21:09

## 2025-07-08 RX ADMIN — GABAPENTIN 400 MG: 400 CAPSULE ORAL at 21:09

## 2025-07-08 RX ADMIN — OXYCODONE HYDROCHLORIDE AND ACETAMINOPHEN 1 TABLET: 5; 325 TABLET ORAL at 22:20

## 2025-07-08 RX ADMIN — ROSUVASTATIN 40 MG: 40 TABLET, FILM COATED ORAL at 18:42

## 2025-07-08 RX ADMIN — ROPINIROLE HYDROCHLORIDE 0.25 MG: 0.25 TABLET, FILM COATED ORAL at 08:18

## 2025-07-08 RX ADMIN — LEVETIRACETAM 500 MG: 500 TABLET, FILM COATED ORAL at 21:09

## 2025-07-08 RX ADMIN — OXYCODONE HYDROCHLORIDE AND ACETAMINOPHEN 1 TABLET: 5; 325 TABLET ORAL at 06:21

## 2025-07-08 RX ADMIN — DONEPEZIL HYDROCHLORIDE 5 MG: 5 TABLET, FILM COATED ORAL at 21:09

## 2025-07-08 RX ADMIN — ASPIRIN 81 MG: 81 TABLET, COATED ORAL at 08:17

## 2025-07-08 RX ADMIN — ENOXAPARIN SODIUM 40 MG: 100 INJECTION SUBCUTANEOUS at 08:17

## 2025-07-08 RX ADMIN — OXYCODONE HYDROCHLORIDE AND ACETAMINOPHEN 1 TABLET: 5; 325 TABLET ORAL at 15:33

## 2025-07-08 RX ADMIN — INSULIN LISPRO 1 UNITS: 100 INJECTION, SOLUTION INTRAVENOUS; SUBCUTANEOUS at 21:09

## 2025-07-08 RX ADMIN — SODIUM CHLORIDE, PRESERVATIVE FREE 10 ML: 5 INJECTION INTRAVENOUS at 08:17

## 2025-07-08 RX ADMIN — IPRATROPIUM BROMIDE AND ALBUTEROL SULFATE 1 DOSE: 2.5; .5 SOLUTION RESPIRATORY (INHALATION) at 18:08

## 2025-07-08 RX ADMIN — SODIUM CHLORIDE, PRESERVATIVE FREE 10 ML: 5 INJECTION INTRAVENOUS at 21:10

## 2025-07-08 RX ADMIN — CLONAZEPAM 1 MG: 1 TABLET ORAL at 08:17

## 2025-07-08 RX ADMIN — TIOTROPIUM BROMIDE INHALATION SPRAY 5 MCG: 3.12 SPRAY, METERED RESPIRATORY (INHALATION) at 07:58

## 2025-07-08 RX ADMIN — FERROUS SULFATE TAB 325 MG (65 MG ELEMENTAL FE) 325 MG: 325 (65 FE) TAB at 08:17

## 2025-07-08 RX ADMIN — METOPROLOL TARTRATE 25 MG: 25 TABLET, FILM COATED ORAL at 08:17

## 2025-07-08 ASSESSMENT — PAIN SCALES - GENERAL
PAINLEVEL_OUTOF10: 7
PAINLEVEL_OUTOF10: 6
PAINLEVEL_OUTOF10: 9
PAINLEVEL_OUTOF10: 5
PAINLEVEL_OUTOF10: 0
PAINLEVEL_OUTOF10: 4
PAINLEVEL_OUTOF10: 6
PAINLEVEL_OUTOF10: 8

## 2025-07-08 ASSESSMENT — PAIN DESCRIPTION - DESCRIPTORS
DESCRIPTORS: ACHING
DESCRIPTORS: ACHING;DISCOMFORT
DESCRIPTORS: PRESSURE;SORE
DESCRIPTORS: ACHING

## 2025-07-08 ASSESSMENT — PAIN - FUNCTIONAL ASSESSMENT
PAIN_FUNCTIONAL_ASSESSMENT: PREVENTS OR INTERFERES SOME ACTIVE ACTIVITIES AND ADLS

## 2025-07-08 ASSESSMENT — PAIN DESCRIPTION - LOCATION
LOCATION: HEAD;SHOULDER
LOCATION: ARM;SHOULDER
LOCATION: ARM;SHOULDER
LOCATION: SHOULDER
LOCATION: ARM;SHOULDER
LOCATION: SHOULDER

## 2025-07-08 ASSESSMENT — PAIN SCALES - WONG BAKER
WONGBAKER_NUMERICALRESPONSE: NO HURT

## 2025-07-08 ASSESSMENT — PAIN DESCRIPTION - ORIENTATION
ORIENTATION: RIGHT

## 2025-07-08 ASSESSMENT — PAIN DESCRIPTION - FREQUENCY
FREQUENCY: CONTINUOUS

## 2025-07-08 ASSESSMENT — PAIN DESCRIPTION - PAIN TYPE
TYPE: ACUTE PAIN

## 2025-07-08 ASSESSMENT — PAIN DESCRIPTION - ONSET
ONSET: ON-GOING

## 2025-07-08 NOTE — DISCHARGE INSTR - COC
Electronically signed by BÁRBARA Donnelly on 7/9/25 at 1:27 PM EDT    PHYSICIAN SECTION    Prognosis: Fair    Condition at Discharge: Stable    Rehab Potential (if transferring to Rehab): {Prognosis:[id number]}    Recommended Labs or Other Treatments After Discharge:   Recommended Follow-up, Labs or Other Treatments After Discharge:    HHC, PT/OT. SN and resp   O2 1-3 liter per nasal cannula  OP follow up with Pulm                Physician Certification: I certify the above information and transfer of Viji Gamez  is necessary for the continuing treatment of the diagnosis listed and that she requires Home Care for greater 30 days.     Update Admission H&P: No change in H&P    PHYSICIAN SIGNATURE:  Electronically signed by MONTSERRAT Rosenberg CNP on 7/8/25 at 12:08 PM EDT/ Dr Jones

## 2025-07-08 NOTE — CONSULTS
Pulmonary Consult Note      Reason for Consult: COPD  Requesting Physician: Miryam Gilmore    Subjective:     CHIEF COMPLAINT / HPI:      The patient is a 75 y.o. female who presented with SOB, edema, and abdominal bloating on 7/5. Patient with a history of COPD, on 3L NC at baseline, Afib, DM 2, HTN, Watchman NAGA closure and previous RCA PCI. Endorses exertional dyspnea, and increased work of breathing at rest which resulted in ED admission.       Past Medical History:      Diagnosis Date    A-fib (HCC)     COPD (chronic obstructive pulmonary disease) (HCC)     Diabetes mellitus (HCC)     H/O heart artery stent     Hypertension     Presence of Watchman left atrial appendage closure device       Past Surgical History:        Procedure Laterality Date    BACK SURGERY      CATARACT EXTRACTION       Current Medications:     sodium chloride flush  5-40 mL IntraVENous 2 times per day    enoxaparin  40 mg SubCUTAneous Daily    [Held by provider] amLODIPine  5 mg Oral Daily    venlafaxine  37.5 mg Oral Daily with breakfast    donepezil  5 mg Oral Nightly    ferrous sulfate  325 mg Oral Daily with breakfast    gabapentin  400 mg Oral BID    levETIRAcetam  500 mg Oral BID    [Held by provider] lisinopril  10 mg Oral Daily    metoprolol tartrate  25 mg Oral BID    arformoterol 15 mcg-budesonide 0.5 mg neb solution   Nebulization BID RT    pantoprazole  40 mg Oral Daily    tiotropium  2 puff Inhalation Daily RT    rosuvastatin  40 mg Oral QPM    rOPINIRole  0.25 mg Oral BID    aspirin  81 mg Oral Daily    furosemide  20 mg Oral Daily    insulin lispro  0-8 Units SubCUTAneous 4 times per day    clonazePAM  1 mg Oral BID     Allergies:    Allergies   Allergen Reactions    Cymbalta [Duloxetine Hcl] Other (See Comments)      Hyponatremia     Sulfa Antibiotics Anxiety     Social History:    TOBACCO:   reports that she has quit smoking. Her smoking use included cigarettes. She started smoking about 25 years ago. She has a 25.5 
07/07/25 0516    aspirin EC tablet 81 mg  81 mg Oral Daily Hussein Hebert DO   81 mg at 07/07/25 0832    furosemide (LASIX) tablet 20 mg  20 mg Oral Daily Hussein Hebert, DO   20 mg at 07/07/25 0832    glucose chewable tablet 16 g  4 tablet Oral PRN Miryam Gilmore APRN - CNP        dextrose bolus 10% 125 mL  125 mL IntraVENous PRN Miryam Gilmore APRN - CNP        Or    dextrose bolus 10% 250 mL  250 mL IntraVENous PRN Miryam Gilmore APRN - CNP        glucagon injection 1 mg  1 mg SubCUTAneous PRN Miryam Gilmore APRN - CNP        dextrose 10 % infusion   IntraVENous Continuous PRN Miryam Gilmore APRN - CNP        insulin lispro (HUMALOG,ADMELOG) injection vial 0-8 Units  0-8 Units SubCUTAneous 4 times per day Miryam Gilmore APRN - CNP            Allergies:  Cymbalta [duloxetine hcl] and Sulfa antibiotics     Review of Systems:     Constitutional: there has been no unanticipated weight loss. There's been no change in energy level, sleep pattern, or activity level.     Eyes: No visual changes or diplopia. No scleral icterus.  ENT: No Headaches, hearing loss or vertigo. No mouth sores or sore throat.  Cardiovascular: Reviewed in HPI  Respiratory: No cough or wheezing, no sputum production. No hematemesis.    Gastrointestinal: No abdominal pain, appetite loss, blood in stools. No change in bowel or bladder habits.  Genitourinary: No dysuria, trouble voiding, or hematuria.  Musculoskeletal:  No gait disturbance, weakness or joint complaints.  Integumentary: No rash or pruritis.  Neurological: No headache, diplopia, change in muscle strength, numbness or tingling. No change in gait, balance, coordination, mood, affect, memory, mentation, behavior.  Psychiatric: No anxiety, no depression.  Endocrine: No malaise, fatigue or temperature intolerance. No excessive thirst, fluid intake, or urination. No tremor.  Hematologic/Lymphatic: No abnormal bruising or bleeding, blood clots or swollen lymph

## 2025-07-08 NOTE — CARE COORDINATION
Case Management -  Discharge Note      Patient Name: Viji Gamez                   YOB: 1950  Room: [facility]/[address] [place] [address]            Readmission Risk (Low < 19, Mod (19-27), High > 27): Readmission Risk Score: 14.5    Current PCP: Azam De Luna MD        PT AM-PAC:   /24  OT AM-PAC:   /24    Patient/patient representative has been educated on the benefits of HHC as well as the possible risks of declining recommended services. Patient/patient representative has acknowledged the information provided and decided on the following discharge plan. Patient/ patient representative has been provided freedom of choice regarding service provider, supported by basic dialogue that supports the patient's individualized plan of care/goals.    Home Care Information:   Is patient resuming current home health care services: Yes     Home Care Agency:     [address], [place], [place] [id number] [phone]     Services:   Home Health Order Obtained: YES    Home health agency notified of discharge.       [facility] agency notified of discharge:  [x] Yes [] No  [] NA    Family notified of discharge:  [x] Yes  [] No  [] NA    Facility notified of discharge:  [] Yes  [] No  [x] NA    Pt is being discharged with Outpt IV Antibiotics  [] Yes [] No  [x] NA  If yes, make sure MATHEW is faxed to [facility] agency, and meds are called in to pharmacy by RN from MATHEW orders only.      Financial    Payor: [employer] / [facility] / Product Type: *No Product type* /     Pharmacy:  Potential assistance Purchasing Medications:    Meds-to-Beds request:        [facility] - [place], [place] - [address] [facility] [phone] - F [phone]  [address] [facility] [address]  Phone: [phone] Fax: [phone]      Notes:    Additional Case Management Notes: Spoke with Dakota at [facility] and left message with pt daughter Elba to convey above info.

## 2025-07-09 VITALS
DIASTOLIC BLOOD PRESSURE: 82 MMHG | OXYGEN SATURATION: 93 % | HEART RATE: 63 BPM | SYSTOLIC BLOOD PRESSURE: 107 MMHG | WEIGHT: 130 LBS | RESPIRATION RATE: 16 BRPM | HEIGHT: 60 IN | BODY MASS INDEX: 25.52 KG/M2 | TEMPERATURE: 97.7 F

## 2025-07-09 LAB
ALBUMIN SERPL-MCNC: 3.7 G/DL (ref 3.4–5)
ALBUMIN/GLOB SERPL: 1.3 {RATIO}
ALP SERPL-CCNC: 93 U/L (ref 40–129)
ALT SERPL-CCNC: 13 U/L (ref 10–40)
ANION GAP SERPL CALCULATED.3IONS-SCNC: 13 MMOL/L (ref 3–16)
AST SERPL-CCNC: 16 U/L (ref 15–37)
BASOPHILS # BLD: 0.07 K/UL (ref 0–0.2)
BASOPHILS NFR BLD: 1 %
BILIRUB SERPL-MCNC: <0.2 MG/DL (ref 0–1)
BUN SERPL-MCNC: 18 MG/DL (ref 7–20)
CALCIUM SERPL-MCNC: 9 MG/DL (ref 8.3–10.6)
CHLORIDE SERPL-SCNC: 101 MMOL/L (ref 99–110)
CO2 SERPL-SCNC: 26 MMOL/L (ref 21–32)
CREAT SERPL-MCNC: 0.6 MG/DL (ref 0.6–1.2)
EOSINOPHIL # BLD: 0.81 K/UL (ref 0–0.6)
EOSINOPHILS RELATIVE PERCENT: 12 %
ERYTHROCYTE [DISTWIDTH] IN BLOOD BY AUTOMATED COUNT: 12.5 % (ref 12.4–15.4)
EST. AVERAGE GLUCOSE BLD GHB EST-MCNC: 143 MG/DL
GFR, ESTIMATED: >90 ML/MIN/1.73M2
GLUCOSE BLD-MCNC: 150 MG/DL (ref 70–99)
GLUCOSE BLD-MCNC: 163 MG/DL (ref 70–99)
GLUCOSE BLD-MCNC: 198 MG/DL (ref 70–99)
GLUCOSE SERPL-MCNC: 172 MG/DL (ref 70–99)
HBA1C MFR BLD: 6.6 %
HCT VFR BLD AUTO: 39.3 % (ref 36–48)
HGB BLD-MCNC: 12.3 G/DL (ref 12–16)
IMM GRANULOCYTES # BLD AUTO: 0.04 K/UL (ref 0–0.5)
IMM GRANULOCYTES NFR BLD: 1 %
LYMPHOCYTES NFR BLD: 1.34 K/UL (ref 1–5.1)
LYMPHOCYTES RELATIVE PERCENT: 19 %
MCH RBC QN AUTO: 28.7 PG (ref 26–34)
MCHC RBC AUTO-ENTMCNC: 31.3 G/DL (ref 31–36)
MCV RBC AUTO: 91.8 FL (ref 80–100)
MONOCYTES NFR BLD: 0.81 K/UL (ref 0–1.3)
MONOCYTES NFR BLD: 12 %
NEUTROPHILS NFR BLD: 56 %
NEUTS SEG NFR BLD: 3.85 K/UL (ref 1.7–7.7)
PLATELET # BLD AUTO: 285 K/UL (ref 135–450)
PMV BLD AUTO: 10.2 FL
POTASSIUM SERPL-SCNC: 4.2 MMOL/L (ref 3.5–5.1)
PROT SERPL-MCNC: 6.7 G/DL (ref 6.4–8.2)
RBC # BLD AUTO: 4.28 M/UL (ref 4–5.2)
SODIUM SERPL-SCNC: 139 MMOL/L (ref 136–145)
WBC OTHER # BLD: 6.9 K/UL (ref 4–11)

## 2025-07-09 PROCEDURE — G0378 HOSPITAL OBSERVATION PER HR: HCPCS

## 2025-07-09 PROCEDURE — 94761 N-INVAS EAR/PLS OXIMETRY MLT: CPT

## 2025-07-09 PROCEDURE — 97530 THERAPEUTIC ACTIVITIES: CPT

## 2025-07-09 PROCEDURE — 6360000002 HC RX W HCPCS: Performed by: STUDENT IN AN ORGANIZED HEALTH CARE EDUCATION/TRAINING PROGRAM

## 2025-07-09 PROCEDURE — 6370000000 HC RX 637 (ALT 250 FOR IP): Performed by: STUDENT IN AN ORGANIZED HEALTH CARE EDUCATION/TRAINING PROGRAM

## 2025-07-09 PROCEDURE — 96372 THER/PROPH/DIAG INJ SC/IM: CPT

## 2025-07-09 PROCEDURE — 2700000000 HC OXYGEN THERAPY PER DAY

## 2025-07-09 PROCEDURE — 85025 COMPLETE CBC W/AUTO DIFF WBC: CPT

## 2025-07-09 PROCEDURE — 2500000003 HC RX 250 WO HCPCS: Performed by: STUDENT IN AN ORGANIZED HEALTH CARE EDUCATION/TRAINING PROGRAM

## 2025-07-09 PROCEDURE — 97161 PT EVAL LOW COMPLEX 20 MIN: CPT

## 2025-07-09 PROCEDURE — 97110 THERAPEUTIC EXERCISES: CPT

## 2025-07-09 PROCEDURE — 82962 GLUCOSE BLOOD TEST: CPT

## 2025-07-09 PROCEDURE — 97116 GAIT TRAINING THERAPY: CPT

## 2025-07-09 PROCEDURE — 94640 AIRWAY INHALATION TREATMENT: CPT

## 2025-07-09 PROCEDURE — 80053 COMPREHEN METABOLIC PANEL: CPT

## 2025-07-09 PROCEDURE — 97165 OT EVAL LOW COMPLEX 30 MIN: CPT

## 2025-07-09 PROCEDURE — 36415 COLL VENOUS BLD VENIPUNCTURE: CPT

## 2025-07-09 PROCEDURE — 6370000000 HC RX 637 (ALT 250 FOR IP): Performed by: NURSE PRACTITIONER

## 2025-07-09 RX ADMIN — LEVETIRACETAM 500 MG: 500 TABLET, FILM COATED ORAL at 10:04

## 2025-07-09 RX ADMIN — ARFORMOTEROL TARTRATE: 15 SOLUTION RESPIRATORY (INHALATION) at 09:14

## 2025-07-09 RX ADMIN — ASPIRIN 81 MG: 81 TABLET, COATED ORAL at 10:04

## 2025-07-09 RX ADMIN — FUROSEMIDE 20 MG: 20 TABLET ORAL at 10:04

## 2025-07-09 RX ADMIN — OXYCODONE HYDROCHLORIDE AND ACETAMINOPHEN 1 TABLET: 5; 325 TABLET ORAL at 11:23

## 2025-07-09 RX ADMIN — SODIUM CHLORIDE, PRESERVATIVE FREE 10 ML: 5 INJECTION INTRAVENOUS at 10:13

## 2025-07-09 RX ADMIN — FERROUS SULFATE TAB 325 MG (65 MG ELEMENTAL FE) 325 MG: 325 (65 FE) TAB at 10:12

## 2025-07-09 RX ADMIN — PANTOPRAZOLE SODIUM 40 MG: 40 TABLET, DELAYED RELEASE ORAL at 11:27

## 2025-07-09 RX ADMIN — VENLAFAXINE HYDROCHLORIDE 37.5 MG: 37.5 CAPSULE, EXTENDED RELEASE ORAL at 11:42

## 2025-07-09 RX ADMIN — METOPROLOL TARTRATE 25 MG: 25 TABLET, FILM COATED ORAL at 10:05

## 2025-07-09 RX ADMIN — CLONAZEPAM 1 MG: 1 TABLET ORAL at 10:05

## 2025-07-09 RX ADMIN — ROPINIROLE HYDROCHLORIDE 0.25 MG: 0.25 TABLET, FILM COATED ORAL at 10:04

## 2025-07-09 RX ADMIN — GABAPENTIN 400 MG: 400 CAPSULE ORAL at 10:04

## 2025-07-09 RX ADMIN — LISINOPRIL 10 MG: 10 TABLET ORAL at 10:04

## 2025-07-09 RX ADMIN — TIOTROPIUM BROMIDE INHALATION SPRAY 5 MCG: 3.12 SPRAY, METERED RESPIRATORY (INHALATION) at 09:14

## 2025-07-09 RX ADMIN — ENOXAPARIN SODIUM 40 MG: 100 INJECTION SUBCUTANEOUS at 10:04

## 2025-07-09 ASSESSMENT — PAIN DESCRIPTION - ORIENTATION
ORIENTATION: RIGHT

## 2025-07-09 ASSESSMENT — PAIN DESCRIPTION - DESCRIPTORS
DESCRIPTORS: ACHING;DISCOMFORT
DESCRIPTORS: DISCOMFORT
DESCRIPTORS: PRESSURE;SORE
DESCRIPTORS: ACHING;DISCOMFORT

## 2025-07-09 ASSESSMENT — PAIN SCALES - GENERAL
PAINLEVEL_OUTOF10: 6
PAINLEVEL_OUTOF10: 5
PAINLEVEL_OUTOF10: 6
PAINLEVEL_OUTOF10: 0
PAINLEVEL_OUTOF10: 4

## 2025-07-09 ASSESSMENT — PAIN - FUNCTIONAL ASSESSMENT
PAIN_FUNCTIONAL_ASSESSMENT: ACTIVITIES ARE NOT PREVENTED
PAIN_FUNCTIONAL_ASSESSMENT: ACTIVITIES ARE NOT PREVENTED
PAIN_FUNCTIONAL_ASSESSMENT: PREVENTS OR INTERFERES SOME ACTIVE ACTIVITIES AND ADLS
PAIN_FUNCTIONAL_ASSESSMENT: ACTIVITIES ARE NOT PREVENTED

## 2025-07-09 ASSESSMENT — PAIN DESCRIPTION - LOCATION
LOCATION: ARM;BACK;SHOULDER
LOCATION: SHOULDER
LOCATION: BACK;SHOULDER;ARM;NECK
LOCATION: ARM;BACK;NECK;SHOULDER

## 2025-07-09 ASSESSMENT — PAIN DESCRIPTION - FREQUENCY: FREQUENCY: CONTINUOUS

## 2025-07-09 ASSESSMENT — PAIN DESCRIPTION - PAIN TYPE: TYPE: ACUTE PAIN

## 2025-07-09 ASSESSMENT — PAIN DESCRIPTION - ONSET: ONSET: ON-GOING

## 2025-07-09 ASSESSMENT — PAIN SCALES - WONG BAKER: WONGBAKER_NUMERICALRESPONSE: NO HURT

## 2025-07-09 NOTE — RT PROTOCOL NOTE
RT Inhaler-Nebulizer Bronchodilator Protocol Note    There is a bronchodilator order in the chart from a provider indicating to follow the RT Bronchodilator Protocol and there is an “Initiate RT Inhaler-Nebulizer Bronchodilator Protocol” order as well (see protocol at bottom of note).    CXR Findings:  XR CHEST PORTABLE  Result Date: 7/6/2025  1. Cardiomegaly otherwise no acute abnormality. Electronically signed by Jose Burns MD      The findings from the last RT Protocol Assessment were as follows:   History Pulmonary Disease: Chronic pulmonary disease  Respiratory Pattern: Regular pattern and RR 12-20 bpm  Breath Sounds: Clear breath sounds  Cough: Strong, spontaneous, non-productive  Indication for Bronchodilator Therapy: None  Bronchodilator Assessment Score: 2    Aerosolized bronchodilator medication orders have been revised according to the RT Inhaler-Nebulizer Bronchodilator Protocol below.    Respiratory Therapist to perform RT Therapy Protocol Assessment initially then follow the protocol.  Repeat RT Therapy Protocol Assessment PRN for score 0-3 or on second treatment, BID, and PRN for scores above 3.    No Indications - adjust the frequency to every 6 hours PRN wheezing or bronchospasm, if no treatments needed after 48 hours then discontinue using Per Protocol order mode.     If indication present, adjust the RT bronchodilator orders based on the Bronchodilator Assessment Score as indicated below.  Use Inhaler orders unless patient has one or more of the following: on home nebulizer, not able to hold breath for 10 seconds, is not alert and oriented, cannot activate and use MDI correctly, or respiratory rate 25 breaths per minute or more, then use the equivalent nebulizer order(s) with same Frequency and PRN reasons based on the score.  If a patient is on this medication at home then do not decrease Frequency below that used at home.    0-3 - enter or revise RT bronchodilator order(s) to equivalent RT 
RT Inhaler-Nebulizer Bronchodilator Protocol Note    There is a bronchodilator order in the chart from a provider indicating to follow the RT Bronchodilator Protocol and there is an “Initiate RT Inhaler-Nebulizer Bronchodilator Protocol” order as well (see protocol at bottom of note).    CXR Findings:  XR CHEST PORTABLE  Result Date: 7/6/2025  1. Cardiomegaly otherwise no acute abnormality. Electronically signed by Jose Burns MD      The findings from the last RT Protocol Assessment were as follows:   History Pulmonary Disease: Chronic pulmonary disease  Respiratory Pattern: Regular pattern and RR 12-20 bpm  Breath Sounds: Slightly diminished and/or crackles  Cough: Strong, spontaneous, non-productive  Indication for Bronchodilator Therapy: On home bronchodilators  Bronchodilator Assessment Score: 4  Pt prefers to keep Duoneb PRN @ this time.  Aerosolized bronchodilator medication orders have been revised according to the RT Inhaler-Nebulizer Bronchodilator Protocol below.    Respiratory Therapist to perform RT Therapy Protocol Assessment initially then follow the protocol.  Repeat RT Therapy Protocol Assessment PRN for score 0-3 or on second treatment, BID, and PRN for scores above 3.    No Indications - adjust the frequency to every 6 hours PRN wheezing or bronchospasm, if no treatments needed after 48 hours then discontinue using Per Protocol order mode.     If indication present, adjust the RT bronchodilator orders based on the Bronchodilator Assessment Score as indicated below.  Use Inhaler orders unless patient has one or more of the following: on home nebulizer, not able to hold breath for 10 seconds, is not alert and oriented, cannot activate and use MDI correctly, or respiratory rate 25 breaths per minute or more, then use the equivalent nebulizer order(s) with same Frequency and PRN reasons based on the score.  If a patient is on this medication at home then do not decrease Frequency below that used 
of every 4 hours PRN for wheezing or increased work of breathing using Per Protocol order mode.        4-6 - enter or revise RT Bronchodilator order(s) to two equivalent RT bronchodilator orders with one order with BID Frequency and one order with Frequency of every 4 hours PRN wheezing or increased work of breathing using Per Protocol order mode.        7-10 - enter or revise RT Bronchodilator order(s) to two equivalent RT bronchodilator orders with one order with TID Frequency and one order with Frequency of every 4 hours PRN wheezing or increased work of breathing using Per Protocol order mode.       11-13 - enter or revise RT Bronchodilator order(s) to one equivalent RT bronchodilator order with QID Frequency and an Albuterol order with Frequency of every 4 hours PRN wheezing or increased work of breathing using Per Protocol order mode.      Greater than 13 - enter or revise RT Bronchodilator order(s) to one equivalent RT bronchodilator order with every 4 hours Frequency and an Albuterol order with Frequency of every 2 hours PRN wheezing or increased work of breathing using Per Protocol order mode.     RT to enter RT Home Evaluation for COPD & MDI Assessment order using Per Protocol order mode.    Electronically signed by Niki Gonzalez RCP on 7/9/2025 at 9:21 AM

## 2025-07-09 NOTE — PROGRESS NOTES
07/07/25 1547   Encounter Summary   Encounter Overview/Reason Attempted Encounter   Referral/Consult From Rounding   Last Encounter  07/07/25  (Patient was with staff/CK)       
   07/08/25 1340   Encounter Summary   Encounter Overview/Reason Attempted Encounter   Service Provided For Patient not available   Referral/Consult From Rounding   Last Encounter  07/08/25  (Patient was sleeping/CK)       
   07/09/25 1334   Encounter Summary   Encounter Overview/Reason Attempted Encounter   Service Provided For Patient not available   Referral/Consult From Rounding   Last Encounter  07/09/25  (Patient was with staff/CK)       
  [facility] - Rehabilitation Department      Physical Therapy  3223/3223-01  [facility] 3 PROGRESSIVE CARE    [x] Initial Evaluation            [x] Daily Treatment Note         [] Discharge Summary      Patient: Viji Gamez   : 1950   MRN: [id number]   Date of Service:  2025   Discharge Recommendations: home with HHPT and 24 hr supv/assist  AMPAC Raw Score:   AM-PAC Inpatient Mobility Raw Score : 22            DME Required For Discharge:   patient has all required DME for discharge  Therapy discharge recommendations take into account each patient's current medical complexities and are subject to input/oversight from the patient's healthcare team.   Barriers to Home Discharge:   [] Steps to access home entry or bed/bath:   [] Unable to transfer, ambulate, or propel wheelchair household distances without assist   [] Limited available assist at home upon discharge    [] Patient or family requests d/c to post-acute facility    [] Poor cognition/safety awareness for d/c to home alone    []Unable to maintain ordered weight bearing status    [] Patient with salient signs of long-standing immobility   [] Patient is at risk for falls due to:   [] Other:  If pt is unable to be seen after this session, please let this note serve as discharge summary.  Please see case management note for discharge disposition.  Thank you.  Admitting Diagnosis: Pericardial effusion  Ordering Physician: Leia Canales, MONTSERRAT - ADOLFO   Current Admission Summary:  H&P Dr. Hussein Hebert,  \"Viji Gamez is a 75 y.o. female with pmh of peripheral neuropathy, anxiety, hypertension, RLS, hyperlipidemia, history of hip fracture, epilepsy on Keppra, COPD, chronic hypoxic respiratory failure on 3 L baseline, atrial fibrillation with watchman in place, DM, CAD status post PCI, CVA, dementia who presents with shortness of breath     Patient was seen in the emergency department with no visitors at bedside.  Patient comes from a 
4 Eyes Skin Assessment     NAME:  Viji Gamez  YOB: 1950  MEDICAL RECORD NUMBER:  [id number]    The patient is being assessed for  Admission    I agree that at least one RN has performed a thorough Head to Toe Skin Assessment on the patient. ALL assessment sites listed below have been assessed.      Areas assessed by both nurses:    Head, Face, Ears, Shoulders, Back, Chest, Arms, Elbows, Hands, Sacrum. Buttock, Coccyx, Ischium, and Legs. Feet and Heels        Does the Patient have a Wound? No noted wound(s)       Reza Prevention initiated by RN: Yes  Wound Care Orders initiated by RN: No    Pressure Injury (Stage 1,2,3,4, Unstageable, DTI, NWPT, and Complex wounds) if present, place Wound referral order by RN under : No    New Ostomies, if present place, Ostomy referral order under : No     Nurse 1 eSignature: Electronically signed by Kt Sylvester RN on 7/7/25 at 3:20 AM EDT    **SHARE this note so that the co-signing nurse can place an eSignature**    Nurse 2 eSignature: Electronically signed by Aibgail Sal RN on 7/7/25 at 3:21 AM EDT    
Patient discharged the facility via w/c by this RN. Patient picked up by her friend She. She and patient stated they didn't have any portable oxygen. Patient states \"I do this all the time, I'll be fine, I don't live that far.\" RN notified the provider who stated to check patient's oxygen on RA over 5 minutes, if she stays above 85% RA, patient may d/c. Patients oxygen assessed, 93% on RA, patient denied SOB. PIV and tele removed. AVS provided to and reviewed with patient. Pt received all belongings.    
Patient was admitted earlier this morning by my colleague.  Patient presented to the emergency room with complaint of increased shortness of breath has history of chronic hypoxic respiratory failure chronically on 3 L at baseline.  Patient had a recent cardiac echo showing pericardial effusion.  Most recently moderate pericardial effusion 1 to 2 cm on echo June 19, 2025.  She also complains of abdominal distention.  Patient is admitted for evaluation of pericardial effusion, 2D echo and cardiology consultation.    Patient was seen and examined at bedside briefly she is resting comfortably no acute distress noted.  She is on her baseline oxygen.  Respirations unlabored decreased breath sounds otherwise clear.  Continue with admission H&P plan.  - 2D echo  - Lasix 20 mg oral daily monitor for improvement  - Abdominal to sound evaluate for ascites imaging pending    Ongoing pericardial effusion  Mild to moderate size By echo 6/13/2025 at [facility], no signs of tamponade. Unchanged from prior study 3/18/2025 also at [facility]. First notes 3/2025 after fracturing ribs 3-6 n left side.     Update 1515  2D echo demonstrated trivial effusion.  Per cardiology difficult to see possibly resolved.  Would not explain her increased shortness of breath.  Cardiology recommended pulmonology consult for severely advanced COPD.  Consult to pulmonology placed-appreciate recommendation  
Pt awake in bed. Calm and cooperative. Assessment completed. Tolerates a regular carb diet. External catheter on at this time. 3 liters NC on at baseline. Right arm piv intact and saline locked. Pt runs afib on telemetry. All safety measures in place including bed alarm and call light in reach. Updated on plan of care. Will continue to monitor.   
This RN assumed care for patient from Emma DORADO. Patient awake in chair. Denies any needs from this RN at this time. Call light within reach.  
  [x] Patient requires assist for static/dynamic standing with assist  up to CGA; patient with knee buckling in standing, decreased as mobility progressed, no assistance needed to correct   [] Patient able to complete in room static and dynamic standing balance activity independently without assistive device.   Comments:     Disease Specific Education for:    [x]Neutral Standing Posture   [x]Orthostatics, dizziness, and fall risk education  Pt educated on tips to decrease fall risk. Pt educated on assistive devices, symptom & exertion awareness, energy conservation, activity pacing, and environmental adapations. Pt verbalized understanding of education given.      Functional Outcomes  -PAC Inpatient Daily Activity Raw Score: 20    ___________________________________________________________________________________________________________________________________________________________    Patient Disposition at start of session:  General: agreeable to OT eval  [x]cleared by Nursing for therapy treatment  [x]Patient on arrival: [x]in bed, []in chair.     Patient Disposition at end of session:  patient left in chair, chair alarm in place, call light within reach, gait belt, patient at risk for falls, nurse notified, positioned for pressure relief to bony prominences , and heels floated      Education   Barriers To Learning: none  Patient Education: patient educated on goals, OT role and benefits, plan of care, precautions, ADL adaptive strategies, proper use of assistive device/equipment, adaptive device training, energy conservation, disease specific education, transfer training, discharge recommendations  Learning Assessment: patient verbalizes and demonstrates understanding    Plan   Frequency: 2-5x/per week  Current Treatment Recommendations: strengthening, balance training, functional mobility training, transfer training, endurance training, ADL/self-care training, and home exercise program    Goals   Patient

## 2025-07-09 NOTE — PLAN OF CARE
Problem: Physical Therapy - Adult  Goal: By Discharge: Performs mobility at highest level of function for planned discharge setting.  See evaluation for individualized goals.  Outcome: Progressing   Esmer Herrera, PT

## 2025-07-09 NOTE — PLAN OF CARE
Problem: Occupational Therapy - Adult  Goal: By Discharge: Performs self-care activities at highest level of function for planned discharge setting.  See evaluation for individualized goals.  Outcome: Progressing   Annie Salas, OT

## 2025-07-09 NOTE — CARE COORDINATION
Case Management -  Discharge Note        Patient Name: Viji Gamez                   YOB: 1950                 Room: [facility]/[address] [place] [address]            Readmission Risk (Low < 19, Mod (19-27), High > 27): Readmission Risk Score: 14.5     Current PCP: Azam De Luna MD           PT AM-PAC:   /24  OT AM-PAC:   /24     Patient/patient representative has been educated on the benefits of HHC as well as the possible risks of declining recommended services. Patient/patient representative has acknowledged the information provided and decided on the following discharge plan. Patient/ patient representative has been provided freedom of choice regarding service provider, supported by basic dialogue that supports the patient's individualized plan of care/goals.     Home Care Information:   Is patient resuming current home health care services: Yes      Home Care Agency:      [address], [place], [place] [address] [phone]     Services:   Home Health Order Obtained: YES     Home health agency notified of discharge.       [facility] agency notified of discharge:  [x] Yes   [] No               [] NA     Family notified of discharge:  [x] Yes             [] No               [] NA     Facility notified of discharge:  [] Yes             [] No               [x] NA     Pt is being discharged with Outpt IV Antibiotics  [] Yes       [] No               [x] NA  If yes, make sure MATHEW is faxed to C agency, and meds are called in to pharmacy by RN from MATHEW orders only.       Financial     [facility] Plan[facility] MEDICAID / Product Type: *No Product type* /      Pharmacy:  Potential assistance Purchasing Medications:    Meds-to-Beds request:          [facility] - [place], [place] - [address] [facility] [phone] - F [phone]  [address] [facility] [address]  Phone: [phone] Fax: [phone]        Notes:     Additional Case Management Notes: Spoke with Dakota olvera

## 2025-07-09 NOTE — DISCHARGE SUMMARY
MCG/ACT inhaler Inhale 2 puffs into the lungs every 6 hours as needed for Wheezing or Shortness of Breath      ergocalciferol (ERGOCALCIFEROL) 1.25 MG (12533 UT) capsule Take 1 capsule by mouth once a week On Sundays      gabapentin (NEURONTIN) 400 MG capsule Take 1 capsule by mouth in the morning and at bedtime.      metFORMIN (GLUCOPHAGE-XR) 500 MG extended release tablet Take 1 tablet by mouth 2 times daily      mometasone-formoterol (DULERA) 200-5 MCG/ACT inhaler Inhale 2 puffs into the lungs in the morning and 2 puffs in the evening.      Multiple Vitamins-Minerals (THERAPEUTIC MULTIVITAMIN-MINERALS) tablet Take 1 tablet by mouth daily      pantoprazole (PROTONIX) 40 MG tablet Take 1 tablet by mouth daily      rosuvastatin (CRESTOR) 40 MG tablet Take 1 tablet by mouth every evening      tiotropium (SPIRIVA) 18 MCG inhalation capsule Inhale 1 capsule into the lungs daily      donepezil (ARICEPT) 5 MG tablet Take 1 tablet by mouth nightly  Qty: 30 tablet, Refills: 0      aspirin 81 MG EC tablet Take 1 tablet by mouth daily      acetaminophen-butalbital  MG TABS Take 1 tablet by mouth every 4 hours as needed for Headaches      clonazePAM (KLONOPIN) 0.5 MG tablet Take 1 tablet by mouth 2 times daily as needed for Anxiety.           Current Discharge Medication List        STOP taking these medications       DULoxetine (CYMBALTA) 30 MG extended release capsule Comments:   Reason for Stopping:         gabapentin (NEURONTIN) 600 MG tablet Comments:   Reason for Stopping:         insulin glargine (LANTUS) 100 UNIT/ML injection vial Comments:   Reason for Stopping:               Significant Test Results    US ABDOMEN LIMITED  Result Date: 7/8/2025  US ABDOMEN LIMITED Indication: Ascites. Technique: Real-time grayscale and color Doppler ultrasonography of the abdomen Findings: No ascites. Incidental note of cholelithiasis without gallbladder wall thickening or pericholecystic fluid.     No ascites. Electronically 
HEART / GREAT VESSELS: Cardiomegaly. Small to moderate pericardial effusion. Left atrial appendage closure device. Normal caliber of the thoracic aorta and central pulmonary arteries. ADENOPATHY: Scattered mediastinal lymph nodes, the largest of which is in the left paratracheal region measuring up to 9 mm in short axis, not considered pathologic using size criteria. CHEST WALL / LOWER NECK: No significant abnormality. UPPER ABDOMEN: Normal. BONES: Compression deformity at T7, which appears to be status post kyphoplasty or vertebroplasty. No lytic or blastic lesions.     1. No evidence of pulmonary embolus.  2. Cardiomegaly with small to moderate pericardial effusion. Electronically signed by Nikolai Strong MD    XR CHEST PORTABLE  Result Date: 7/6/2025  History: Shortness of breath. AP chest. COMPARISON: 5/24/2025. FINDINGS: No effusion or consolidation. No acute osseous abnormality. Tortuous ectatic thoracic aorta. Cardiac enlargement.     1. Cardiomegaly otherwise no acute abnormality. Electronically signed by Jose Burns MD      Consults:     IP CONSULT TO HOSPITALIST  IP CONSULT TO CARDIOLOGY  IP CONSULT TO PULMONOLOGY    Labs:     Recent Labs     07/06/25 2250 07/08/25  0529   WBC 6.7 7.7   HGB 12.1 12.8   HCT 37.5 40.9    305     Recent Labs     07/06/25 2250 07/07/25  0650 07/08/25  0529    137 138   K 4.0 4.1 4.0   CL 98* 100 101   CO2 22 25 25   BUN 20 15 18   CREATININE 0.6 0.6 0.6   CALCIUM 9.1 8.8 8.9     Recent Labs     07/06/25 2250 07/07/25  0005   PROBNP 413*  --    TROPHS 9 9     No results for input(s): \"LABA1C\" in the last 72 hours.  Recent Labs     07/06/25 2250 07/07/25  0650 07/08/25  0529   AST 23 17 16   ALT 12 9* 10   BILITOT <0.2 0.3 <0.2   ALKPHOS 100 94 100     Recent Labs     07/07/25  0650   TSH 2.28       Urine Cultures: No results found for: \"LABURIN\"  Blood Cultures: No results found for: \"BC\"  No results found for: \"BLOODCULT2\"  Organism: No results found for:

## 2025-07-14 NOTE — PROGRESS NOTES
Viji Gamez (:  1950) is a 75 y.o. female,Established patient, here for evaluation of the following chief complaint(s):  Follow-Up from Hospital (Feeling dizzy )    Ms Gamez presented with lightheadedness and dizziness. SBP~80 with HR irregular >120.  She states she has fell dizzy since this morning.  I decided to send her to the ED for further evaluation.  We will reschedule an appointment in 4-6 weeks.       Assessment & Plan  Chronic obstructive pulmonary disease, unspecified COPD type (HCC)   Takes Dulara, symbicort inhalers? as well as an rescue inhalers.  She was in no respiratory distress, no accessory muscle use, no wheezing.  I recommend following up with her in 4-6 weeks           No follow-ups on file.       Subjective   Ms Gamez has a past medical history for COPD on 3L NC at home, AFIB, DM2, HTN, Watchman NAGA closure and previous RCA PCI, peripheral neuropathy, anxiety, epilepsy on Keppra and dementia who is here as a follow up from discharge on 2025.  She was seen in the hospital for shortness of breath, edema and abdominal bloating.  Previously to the admission she had a known pericardial effusion, which was followed by cardiology here, finding it improved on this admission.  Ms Gamez was started on gentle diuresis with 20mg oral lasix with improved symptomology.  Ms. Gamez is a 25.5 pack-year smoking history, no official diagnosis of COPD with no PFTs on file.  She is here for hospital follow up and to establish care.      Review of Systems   Constitutional:  Positive for activity change.   Respiratory:  Negative for chest tightness and shortness of breath.    Cardiovascular:  Negative for chest pain.   Gastrointestinal:  Negative for abdominal pain.   Neurological:  Positive for dizziness and light-headedness.          Objective   Physical Exam  Constitutional:       Appearance: Normal appearance.   HENT:      Head: Normocephalic.   Cardiovascular:      Rate and Rhythm:

## 2025-07-15 ENCOUNTER — APPOINTMENT (OUTPATIENT)
Age: 75
DRG: 720 | End: 2025-07-15
Payer: COMMERCIAL

## 2025-07-15 ENCOUNTER — OFFICE VISIT (OUTPATIENT)
Age: 75
End: 2025-07-15
Payer: COMMERCIAL

## 2025-07-15 ENCOUNTER — HOSPITAL ENCOUNTER (INPATIENT)
Age: 75
LOS: 3 days | Discharge: HOME HEALTH | DRG: 720 | End: 2025-07-19
Attending: EMERGENCY MEDICINE | Admitting: HOSPITALIST
Payer: COMMERCIAL

## 2025-07-15 VITALS — BODY MASS INDEX: 22.97 KG/M2 | WEIGHT: 117 LBS | HEART RATE: 128 BPM | HEIGHT: 60 IN | OXYGEN SATURATION: 90 %

## 2025-07-15 DIAGNOSIS — R07.9 CHEST PAIN, UNSPECIFIED TYPE: ICD-10-CM

## 2025-07-15 DIAGNOSIS — I48.91 ATRIAL FIBRILLATION WITH RAPID VENTRICULAR RESPONSE (HCC): Primary | ICD-10-CM

## 2025-07-15 DIAGNOSIS — Z72.0 TOBACCO USE: Primary | ICD-10-CM

## 2025-07-15 DIAGNOSIS — J44.9 CHRONIC OBSTRUCTIVE PULMONARY DISEASE, UNSPECIFIED COPD TYPE (HCC): ICD-10-CM

## 2025-07-15 DIAGNOSIS — R79.89 ELEVATED TROPONIN: ICD-10-CM

## 2025-07-15 DIAGNOSIS — E87.1 HYPONATREMIA: ICD-10-CM

## 2025-07-15 DIAGNOSIS — R06.00 ACUTE DYSPNEA: ICD-10-CM

## 2025-07-15 DIAGNOSIS — J18.9 PNEUMONIA OF LEFT LOWER LOBE DUE TO INFECTIOUS ORGANISM: ICD-10-CM

## 2025-07-15 LAB
ALBUMIN SERPL-MCNC: 3.9 G/DL (ref 3.4–5)
ALBUMIN/GLOB SERPL: 1.2 {RATIO}
ALP SERPL-CCNC: 106 U/L (ref 40–129)
ALT SERPL-CCNC: 11 U/L (ref 10–40)
ANION GAP SERPL CALCULATED.3IONS-SCNC: 17 MMOL/L (ref 3–16)
AST SERPL-CCNC: 15 U/L (ref 15–37)
BACTERIA URNS QL MICRO: ABNORMAL
BASOPHILS # BLD: 0.04 K/UL (ref 0–0.2)
BASOPHILS NFR BLD: 0 %
BILIRUB SERPL-MCNC: 0.9 MG/DL (ref 0–1)
BILIRUB UR QL STRIP: NEGATIVE
BNP SERPL-MCNC: 833 PG/ML (ref 0–124)
BUN SERPL-MCNC: 23 MG/DL (ref 7–20)
CALCIUM SERPL-MCNC: 9 MG/DL (ref 8.3–10.6)
CHARACTER UR: ABNORMAL
CHLORIDE SERPL-SCNC: 89 MMOL/L (ref 99–110)
CLARITY UR: CLEAR
CO2 SERPL-SCNC: 22 MMOL/L (ref 21–32)
COLOR UR: YELLOW
CREAT SERPL-MCNC: 1.2 MG/DL (ref 0.6–1.2)
D DIMER PPP FEU-MCNC: 3.04 UG/ML FEU (ref 0–0.6)
EOSINOPHIL # BLD: 0.02 K/UL (ref 0–0.6)
EOSINOPHILS RELATIVE PERCENT: 0 %
EPI CELLS #/AREA URNS HPF: ABNORMAL /HPF
ERYTHROCYTE [DISTWIDTH] IN BLOOD BY AUTOMATED COUNT: 12.9 % (ref 12.4–15.4)
GFR, ESTIMATED: 47 ML/MIN/1.73M2
GLUCOSE BLD-MCNC: 134 MG/DL (ref 70–99)
GLUCOSE BLD-MCNC: 300 MG/DL (ref 70–99)
GLUCOSE BLD-MCNC: 327 MG/DL (ref 70–99)
GLUCOSE SERPL-MCNC: 206 MG/DL (ref 70–99)
GLUCOSE UR STRIP-MCNC: NEGATIVE MG/DL
HCT VFR BLD AUTO: 38.1 % (ref 36–48)
HGB BLD-MCNC: 12.3 G/DL (ref 12–16)
HGB UR QL STRIP.AUTO: ABNORMAL
IMM GRANULOCYTES # BLD AUTO: 0.09 K/UL (ref 0–0.5)
IMM GRANULOCYTES NFR BLD: 1 %
KETONES UR STRIP-MCNC: NEGATIVE MG/DL
LACTATE BLDV-SCNC: 1.1 MMOL/L (ref 0.4–1.9)
LEUKOCYTE ESTERASE UR QL STRIP: ABNORMAL
LYMPHOCYTES NFR BLD: 0.62 K/UL (ref 1–5.1)
LYMPHOCYTES RELATIVE PERCENT: 3 %
MCH RBC QN AUTO: 28.3 PG (ref 26–34)
MCHC RBC AUTO-ENTMCNC: 32.3 G/DL (ref 31–36)
MCV RBC AUTO: 87.8 FL (ref 80–100)
MONOCYTES NFR BLD: 1.44 K/UL (ref 0–1.3)
MONOCYTES NFR BLD: 7 %
NEUTROPHILS NFR BLD: 89 %
NEUTS SEG NFR BLD: 17.16 K/UL (ref 1.7–7.7)
NITRITE UR QL STRIP: POSITIVE
PH UR STRIP: 6 [PH] (ref 5–8)
PLATELET # BLD AUTO: 306 K/UL (ref 135–450)
PMV BLD AUTO: 10 FL (ref 9.4–12.4)
POTASSIUM SERPL-SCNC: 4 MMOL/L (ref 3.5–5.1)
PROT SERPL-MCNC: 7.1 G/DL (ref 6.4–8.2)
PROT UR STRIP-MCNC: 100 MG/DL
RBC # BLD AUTO: 4.34 M/UL (ref 4–5.2)
RBC #/AREA URNS HPF: ABNORMAL /HPF
SODIUM SERPL-SCNC: 129 MMOL/L (ref 136–145)
SP GR UR STRIP: 1.01 (ref 1–1.03)
TROPONIN I SERPL HS-MCNC: 16 NG/L (ref 0–14)
TROPONIN I SERPL HS-MCNC: 17 NG/L (ref 0–14)
UROBILINOGEN UR STRIP-ACNC: 1 EU/DL (ref 0–1)
WBC #/AREA URNS HPF: ABNORMAL /HPF
WBC OTHER # BLD: 19.4 K/UL (ref 4–11)

## 2025-07-15 PROCEDURE — 83880 ASSAY OF NATRIURETIC PEPTIDE: CPT

## 2025-07-15 PROCEDURE — 96365 THER/PROPH/DIAG IV INF INIT: CPT

## 2025-07-15 PROCEDURE — 96372 THER/PROPH/DIAG INJ SC/IM: CPT

## 2025-07-15 PROCEDURE — 2700000000 HC OXYGEN THERAPY PER DAY

## 2025-07-15 PROCEDURE — G8427 DOCREV CUR MEDS BY ELIG CLIN: HCPCS | Performed by: NURSE PRACTITIONER

## 2025-07-15 PROCEDURE — 96366 THER/PROPH/DIAG IV INF ADDON: CPT

## 2025-07-15 PROCEDURE — 80053 COMPREHEN METABOLIC PANEL: CPT

## 2025-07-15 PROCEDURE — 99291 CRITICAL CARE FIRST HOUR: CPT | Performed by: INTERNAL MEDICINE

## 2025-07-15 PROCEDURE — 3017F COLORECTAL CA SCREEN DOC REV: CPT | Performed by: NURSE PRACTITIONER

## 2025-07-15 PROCEDURE — 1036F TOBACCO NON-USER: CPT | Performed by: NURSE PRACTITIONER

## 2025-07-15 PROCEDURE — 85025 COMPLETE CBC W/AUTO DIFF WBC: CPT

## 2025-07-15 PROCEDURE — G0378 HOSPITAL OBSERVATION PER HR: HCPCS

## 2025-07-15 PROCEDURE — G8420 CALC BMI NORM PARAMETERS: HCPCS | Performed by: NURSE PRACTITIONER

## 2025-07-15 PROCEDURE — 87077 CULTURE AEROBIC IDENTIFY: CPT

## 2025-07-15 PROCEDURE — 83605 ASSAY OF LACTIC ACID: CPT

## 2025-07-15 PROCEDURE — 3023F SPIROM DOC REV: CPT | Performed by: NURSE PRACTITIONER

## 2025-07-15 PROCEDURE — 96375 TX/PRO/DX INJ NEW DRUG ADDON: CPT

## 2025-07-15 PROCEDURE — 6370000000 HC RX 637 (ALT 250 FOR IP): Performed by: EMERGENCY MEDICINE

## 2025-07-15 PROCEDURE — 1090F PRES/ABSN URINE INCON ASSESS: CPT | Performed by: NURSE PRACTITIONER

## 2025-07-15 PROCEDURE — 87086 URINE CULTURE/COLONY COUNT: CPT

## 2025-07-15 PROCEDURE — 99202 OFFICE O/P NEW SF 15 MIN: CPT | Performed by: NURSE PRACTITIONER

## 2025-07-15 PROCEDURE — G8400 PT W/DXA NO RESULTS DOC: HCPCS | Performed by: NURSE PRACTITIONER

## 2025-07-15 PROCEDURE — 87040 BLOOD CULTURE FOR BACTERIA: CPT

## 2025-07-15 PROCEDURE — 6360000002 HC RX W HCPCS: Performed by: EMERGENCY MEDICINE

## 2025-07-15 PROCEDURE — 2500000003 HC RX 250 WO HCPCS: Performed by: INTERNAL MEDICINE

## 2025-07-15 PROCEDURE — 6360000002 HC RX W HCPCS: Performed by: INTERNAL MEDICINE

## 2025-07-15 PROCEDURE — 87154 CUL TYP ID BLD PTHGN 6+ TRGT: CPT

## 2025-07-15 PROCEDURE — 99285 EMERGENCY DEPT VISIT HI MDM: CPT

## 2025-07-15 PROCEDURE — 85379 FIBRIN DEGRADATION QUANT: CPT

## 2025-07-15 PROCEDURE — 84484 ASSAY OF TROPONIN QUANT: CPT

## 2025-07-15 PROCEDURE — 6370000000 HC RX 637 (ALT 250 FOR IP): Performed by: NURSE PRACTITIONER

## 2025-07-15 PROCEDURE — 6360000002 HC RX W HCPCS: Performed by: NURSE PRACTITIONER

## 2025-07-15 PROCEDURE — 93005 ELECTROCARDIOGRAM TRACING: CPT | Performed by: EMERGENCY MEDICINE

## 2025-07-15 PROCEDURE — 71260 CT THORAX DX C+: CPT

## 2025-07-15 PROCEDURE — 81001 URINALYSIS AUTO W/SCOPE: CPT

## 2025-07-15 PROCEDURE — 1123F ACP DISCUSS/DSCN MKR DOCD: CPT | Performed by: NURSE PRACTITIONER

## 2025-07-15 PROCEDURE — 82962 GLUCOSE BLOOD TEST: CPT

## 2025-07-15 PROCEDURE — 2500000003 HC RX 250 WO HCPCS: Performed by: EMERGENCY MEDICINE

## 2025-07-15 PROCEDURE — 94640 AIRWAY INHALATION TREATMENT: CPT

## 2025-07-15 PROCEDURE — 94761 N-INVAS EAR/PLS OXIMETRY MLT: CPT

## 2025-07-15 PROCEDURE — 6360000004 HC RX CONTRAST MEDICATION: Performed by: EMERGENCY MEDICINE

## 2025-07-15 PROCEDURE — 2580000003 HC RX 258: Performed by: EMERGENCY MEDICINE

## 2025-07-15 PROCEDURE — 71046 X-RAY EXAM CHEST 2 VIEWS: CPT

## 2025-07-15 PROCEDURE — 96367 TX/PROPH/DG ADDL SEQ IV INF: CPT

## 2025-07-15 RX ORDER — PANTOPRAZOLE SODIUM 40 MG/1
40 TABLET, DELAYED RELEASE ORAL
Status: DISCONTINUED | OUTPATIENT
Start: 2025-07-16 | End: 2025-07-19 | Stop reason: HOSPADM

## 2025-07-15 RX ORDER — IPRATROPIUM BROMIDE AND ALBUTEROL SULFATE 2.5; .5 MG/3ML; MG/3ML
1 SOLUTION RESPIRATORY (INHALATION) EVERY 4 HOURS PRN
Status: DISCONTINUED | OUTPATIENT
Start: 2025-07-15 | End: 2025-07-19 | Stop reason: HOSPADM

## 2025-07-15 RX ORDER — OXYCODONE HYDROCHLORIDE 5 MG/1
5 TABLET ORAL EVERY 4 HOURS PRN
Refills: 0 | Status: DISCONTINUED | OUTPATIENT
Start: 2025-07-15 | End: 2025-07-17

## 2025-07-15 RX ORDER — CLONAZEPAM 1 MG/1
0.5 TABLET ORAL 2 TIMES DAILY PRN
Status: DISCONTINUED | OUTPATIENT
Start: 2025-07-15 | End: 2025-07-16

## 2025-07-15 RX ORDER — OXYCODONE HYDROCHLORIDE 5 MG/1
10 TABLET ORAL EVERY 4 HOURS PRN
Refills: 0 | Status: DISCONTINUED | OUTPATIENT
Start: 2025-07-15 | End: 2025-07-17

## 2025-07-15 RX ORDER — GABAPENTIN 400 MG/1
400 CAPSULE ORAL 2 TIMES DAILY
Status: DISCONTINUED | OUTPATIENT
Start: 2025-07-15 | End: 2025-07-16

## 2025-07-15 RX ORDER — DILTIAZEM HCL IN NACL,ISO-OSM 125 MG/125
2.5-15 PLASTIC BAG, INJECTION (ML) INTRAVENOUS CONTINUOUS
Status: DISCONTINUED | OUTPATIENT
Start: 2025-07-15 | End: 2025-07-17

## 2025-07-15 RX ORDER — ACETAMINOPHEN 325 MG/1
650 TABLET ORAL EVERY 6 HOURS PRN
Status: DISCONTINUED | OUTPATIENT
Start: 2025-07-15 | End: 2025-07-19 | Stop reason: HOSPADM

## 2025-07-15 RX ORDER — SODIUM CHLORIDE 9 MG/ML
INJECTION, SOLUTION INTRAVENOUS PRN
Status: DISCONTINUED | OUTPATIENT
Start: 2025-07-15 | End: 2025-07-19 | Stop reason: HOSPADM

## 2025-07-15 RX ORDER — DEXTROSE MONOHYDRATE 100 MG/ML
INJECTION, SOLUTION INTRAVENOUS CONTINUOUS PRN
Status: DISCONTINUED | OUTPATIENT
Start: 2025-07-15 | End: 2025-07-19 | Stop reason: HOSPADM

## 2025-07-15 RX ORDER — ASPIRIN 81 MG/1
324 TABLET, CHEWABLE ORAL ONCE
Status: COMPLETED | OUTPATIENT
Start: 2025-07-15 | End: 2025-07-15

## 2025-07-15 RX ORDER — DULOXETIN HYDROCHLORIDE 30 MG/1
30 CAPSULE, DELAYED RELEASE ORAL DAILY
Status: DISCONTINUED | OUTPATIENT
Start: 2025-07-16 | End: 2025-07-16 | Stop reason: ALTCHOICE

## 2025-07-15 RX ORDER — POLYETHYLENE GLYCOL 3350 17 G/17G
17 POWDER, FOR SOLUTION ORAL DAILY PRN
Status: DISCONTINUED | OUTPATIENT
Start: 2025-07-15 | End: 2025-07-19 | Stop reason: HOSPADM

## 2025-07-15 RX ORDER — BUTALBITAL AND ACETAMINOPHEN 325; 50 MG/1; MG/1
1 TABLET ORAL EVERY 4 HOURS PRN
Status: DISCONTINUED | OUTPATIENT
Start: 2025-07-15 | End: 2025-07-16 | Stop reason: ALTCHOICE

## 2025-07-15 RX ORDER — POTASSIUM CHLORIDE 1500 MG/1
40 TABLET, EXTENDED RELEASE ORAL PRN
Status: DISCONTINUED | OUTPATIENT
Start: 2025-07-15 | End: 2025-07-19 | Stop reason: HOSPADM

## 2025-07-15 RX ORDER — SODIUM CHLORIDE 0.9 % (FLUSH) 0.9 %
5-40 SYRINGE (ML) INJECTION PRN
Status: DISCONTINUED | OUTPATIENT
Start: 2025-07-15 | End: 2025-07-19 | Stop reason: HOSPADM

## 2025-07-15 RX ORDER — INSULIN LISPRO 100 [IU]/ML
0-4 INJECTION, SOLUTION INTRAVENOUS; SUBCUTANEOUS
Status: DISCONTINUED | OUTPATIENT
Start: 2025-07-15 | End: 2025-07-17

## 2025-07-15 RX ORDER — LEVETIRACETAM 500 MG/1
500 TABLET ORAL 2 TIMES DAILY
Status: DISCONTINUED | OUTPATIENT
Start: 2025-07-15 | End: 2025-07-19 | Stop reason: HOSPADM

## 2025-07-15 RX ORDER — MAGNESIUM SULFATE IN WATER 40 MG/ML
2000 INJECTION, SOLUTION INTRAVENOUS PRN
Status: DISCONTINUED | OUTPATIENT
Start: 2025-07-15 | End: 2025-07-19 | Stop reason: HOSPADM

## 2025-07-15 RX ORDER — DILTIAZEM HYDROCHLORIDE 5 MG/ML
20 INJECTION INTRAVENOUS ONCE
Status: COMPLETED | OUTPATIENT
Start: 2025-07-15 | End: 2025-07-15

## 2025-07-15 RX ORDER — SODIUM CHLORIDE 0.9 % (FLUSH) 0.9 %
5-40 SYRINGE (ML) INJECTION EVERY 12 HOURS SCHEDULED
Status: DISCONTINUED | OUTPATIENT
Start: 2025-07-15 | End: 2025-07-19 | Stop reason: HOSPADM

## 2025-07-15 RX ORDER — M-VIT,TX,IRON,MINS/CALC/FOLIC 27MG-0.4MG
1 TABLET ORAL DAILY
Status: DISCONTINUED | OUTPATIENT
Start: 2025-07-16 | End: 2025-07-19 | Stop reason: HOSPADM

## 2025-07-15 RX ORDER — DULOXETIN HYDROCHLORIDE 30 MG/1
30 CAPSULE, DELAYED RELEASE ORAL 2 TIMES DAILY
COMMUNITY
Start: 2025-07-14

## 2025-07-15 RX ORDER — ONDANSETRON 4 MG/1
4 TABLET, ORALLY DISINTEGRATING ORAL EVERY 8 HOURS PRN
Status: DISCONTINUED | OUTPATIENT
Start: 2025-07-15 | End: 2025-07-19 | Stop reason: HOSPADM

## 2025-07-15 RX ORDER — IOPAMIDOL 755 MG/ML
75 INJECTION, SOLUTION INTRAVASCULAR
Status: COMPLETED | OUTPATIENT
Start: 2025-07-15 | End: 2025-07-15

## 2025-07-15 RX ORDER — ENOXAPARIN SODIUM 100 MG/ML
40 INJECTION SUBCUTANEOUS DAILY
Status: DISCONTINUED | OUTPATIENT
Start: 2025-07-15 | End: 2025-07-16

## 2025-07-15 RX ORDER — ASPIRIN 81 MG/1
81 TABLET ORAL DAILY
Status: DISCONTINUED | OUTPATIENT
Start: 2025-07-16 | End: 2025-07-19 | Stop reason: HOSPADM

## 2025-07-15 RX ORDER — ONDANSETRON 2 MG/ML
4 INJECTION INTRAMUSCULAR; INTRAVENOUS EVERY 6 HOURS PRN
Status: DISCONTINUED | OUTPATIENT
Start: 2025-07-15 | End: 2025-07-19 | Stop reason: HOSPADM

## 2025-07-15 RX ORDER — ROPINIROLE 0.25 MG/1
0.25 TABLET, FILM COATED ORAL 2 TIMES DAILY
Status: DISCONTINUED | OUTPATIENT
Start: 2025-07-15 | End: 2025-07-19 | Stop reason: HOSPADM

## 2025-07-15 RX ORDER — IPRATROPIUM BROMIDE AND ALBUTEROL SULFATE 2.5; .5 MG/3ML; MG/3ML
1 SOLUTION RESPIRATORY (INHALATION)
Status: DISCONTINUED | OUTPATIENT
Start: 2025-07-15 | End: 2025-07-17

## 2025-07-15 RX ORDER — FERROUS SULFATE 325(65) MG
325 TABLET ORAL
Status: DISCONTINUED | OUTPATIENT
Start: 2025-07-16 | End: 2025-07-19 | Stop reason: HOSPADM

## 2025-07-15 RX ORDER — ROSUVASTATIN CALCIUM 20 MG/1
40 TABLET, COATED ORAL EVERY EVENING
Status: DISCONTINUED | OUTPATIENT
Start: 2025-07-15 | End: 2025-07-16

## 2025-07-15 RX ORDER — ENOXAPARIN SODIUM 100 MG/ML
40 INJECTION SUBCUTANEOUS DAILY
Status: DISCONTINUED | OUTPATIENT
Start: 2025-07-15 | End: 2025-07-15 | Stop reason: SDUPTHER

## 2025-07-15 RX ORDER — GLUCAGON 1 MG/ML
1 KIT INJECTION PRN
Status: DISCONTINUED | OUTPATIENT
Start: 2025-07-15 | End: 2025-07-19 | Stop reason: HOSPADM

## 2025-07-15 RX ORDER — ACETAMINOPHEN 650 MG/1
650 SUPPOSITORY RECTAL EVERY 6 HOURS PRN
Status: DISCONTINUED | OUTPATIENT
Start: 2025-07-15 | End: 2025-07-19 | Stop reason: HOSPADM

## 2025-07-15 RX ORDER — AMLODIPINE BESYLATE 5 MG/1
5 TABLET ORAL DAILY
Status: DISCONTINUED | OUTPATIENT
Start: 2025-07-16 | End: 2025-07-15

## 2025-07-15 RX ORDER — POTASSIUM CHLORIDE 7.45 MG/ML
10 INJECTION INTRAVENOUS PRN
Status: DISCONTINUED | OUTPATIENT
Start: 2025-07-15 | End: 2025-07-19 | Stop reason: HOSPADM

## 2025-07-15 RX ORDER — DONEPEZIL HYDROCHLORIDE 5 MG/1
5 TABLET, FILM COATED ORAL NIGHTLY
Status: DISCONTINUED | OUTPATIENT
Start: 2025-07-15 | End: 2025-07-19 | Stop reason: HOSPADM

## 2025-07-15 RX ADMIN — INSULIN LISPRO 3 UNITS: 100 INJECTION, SOLUTION INTRAVENOUS; SUBCUTANEOUS at 20:21

## 2025-07-15 RX ADMIN — ENOXAPARIN SODIUM 40 MG: 100 INJECTION SUBCUTANEOUS at 20:16

## 2025-07-15 RX ADMIN — IPRATROPIUM BROMIDE AND ALBUTEROL SULFATE 1 DOSE: 2.5; .5 SOLUTION RESPIRATORY (INHALATION) at 20:43

## 2025-07-15 RX ADMIN — ARFORMOTEROL TARTRATE: 15 SOLUTION RESPIRATORY (INHALATION) at 20:42

## 2025-07-15 RX ADMIN — ROPINIROLE HYDROCHLORIDE 0.25 MG: 0.25 TABLET, FILM COATED ORAL at 20:16

## 2025-07-15 RX ADMIN — DILTIAZEM HYDROCHLORIDE 20 MG: 5 INJECTION, SOLUTION INTRAVENOUS at 17:44

## 2025-07-15 RX ADMIN — ACETAMINOPHEN 650 MG: 325 TABLET ORAL at 20:16

## 2025-07-15 RX ADMIN — DONEPEZIL HYDROCHLORIDE 5 MG: 5 TABLET, FILM COATED ORAL at 20:16

## 2025-07-15 RX ADMIN — AZITHROMYCIN MONOHYDRATE 500 MG: 500 INJECTION, POWDER, LYOPHILIZED, FOR SOLUTION INTRAVENOUS at 12:44

## 2025-07-15 RX ADMIN — IPRATROPIUM BROMIDE AND ALBUTEROL SULFATE 1 DOSE: .5; 2.5 SOLUTION RESPIRATORY (INHALATION) at 17:23

## 2025-07-15 RX ADMIN — LEVETIRACETAM 500 MG: 500 TABLET, FILM COATED ORAL at 20:16

## 2025-07-15 RX ADMIN — IOPAMIDOL 75 ML: 755 INJECTION, SOLUTION INTRAVENOUS at 12:24

## 2025-07-15 RX ADMIN — Medication 5 MG/HR: at 18:11

## 2025-07-15 RX ADMIN — ASPIRIN 324 MG: 81 TABLET, CHEWABLE ORAL at 11:48

## 2025-07-15 RX ADMIN — GABAPENTIN 400 MG: 400 CAPSULE ORAL at 20:17

## 2025-07-15 RX ADMIN — ROSUVASTATIN 40 MG: 20 TABLET, FILM COATED ORAL at 20:16

## 2025-07-15 RX ADMIN — CLONAZEPAM 0.5 MG: 1 TABLET ORAL at 21:29

## 2025-07-15 RX ADMIN — WATER 1000 MG: 1 INJECTION INTRAMUSCULAR; INTRAVENOUS; SUBCUTANEOUS at 12:39

## 2025-07-15 ASSESSMENT — ENCOUNTER SYMPTOMS
ABDOMINAL PAIN: 0
CHEST TIGHTNESS: 0
SHORTNESS OF BREATH: 0

## 2025-07-15 ASSESSMENT — PAIN DESCRIPTION - FREQUENCY
FREQUENCY: INTERMITTENT
FREQUENCY: INTERMITTENT

## 2025-07-15 ASSESSMENT — PAIN DESCRIPTION - LOCATION
LOCATION: BACK;SHOULDER
LOCATION: BACK

## 2025-07-15 ASSESSMENT — PAIN DESCRIPTION - DIRECTION: RADIATING_TOWARDS: ALL OVER

## 2025-07-15 ASSESSMENT — PAIN SCALES - GENERAL
PAINLEVEL_OUTOF10: 6
PAINLEVEL_OUTOF10: 6
PAINLEVEL_OUTOF10: 0
PAINLEVEL_OUTOF10: 0

## 2025-07-15 ASSESSMENT — PAIN DESCRIPTION - ORIENTATION
ORIENTATION: RIGHT
ORIENTATION: RIGHT

## 2025-07-15 ASSESSMENT — PAIN - FUNCTIONAL ASSESSMENT
PAIN_FUNCTIONAL_ASSESSMENT: NONE - DENIES PAIN
PAIN_FUNCTIONAL_ASSESSMENT: ACTIVITIES ARE NOT PREVENTED
PAIN_FUNCTIONAL_ASSESSMENT: ACTIVITIES ARE NOT PREVENTED

## 2025-07-15 ASSESSMENT — PAIN DESCRIPTION - ONSET
ONSET: GRADUAL
ONSET: GRADUAL

## 2025-07-15 ASSESSMENT — LIFESTYLE VARIABLES
HOW MANY STANDARD DRINKS CONTAINING ALCOHOL DO YOU HAVE ON A TYPICAL DAY: 3 OR 4
HOW OFTEN DO YOU HAVE A DRINK CONTAINING ALCOHOL: 2-3 TIMES A WEEK

## 2025-07-15 ASSESSMENT — PAIN DESCRIPTION - PAIN TYPE
TYPE: CHRONIC PAIN
TYPE: CHRONIC PAIN

## 2025-07-15 ASSESSMENT — PAIN DESCRIPTION - DESCRIPTORS
DESCRIPTORS: ACHING;DISCOMFORT
DESCRIPTORS: ACHING

## 2025-07-15 ASSESSMENT — PAIN SCALES - WONG BAKER: WONGBAKER_NUMERICALRESPONSE: NO HURT

## 2025-07-15 NOTE — FLOWSHEET NOTE
07/15/25 1730   Vital Signs   Pulse (!) 140   /66   MAP (Calculated) 80   Pain Assessment   Pain Assessment 0-10   Pain Level 0   Hurtado-Baker Pain Rating 0     IV cardizem bolus given at this time.    Latoya Bahena RN

## 2025-07-15 NOTE — ED NOTES
Patient states Azithromycin is making her arm \"swell and burn\". Antibiotic stopped at this time, DO at bedside to assess arm. IV site patent, blood return noted and flushed without resistance.

## 2025-07-15 NOTE — ED TRIAGE NOTES
Patient arrives to ED stating her pulmonologist sent her to the ED at her appt today due to \"problems with my vitals\", patient unable to elaborate on this. No information available via chart review at this time. Patient reports she is starting to experience SOB. Denies chest pain. Patient states she is always in A fib. HX: COPD

## 2025-07-15 NOTE — ED NOTES
ED to Inpatient Handoff SBAR    Patient Name: Viji Gamez   :  1950  75 y.o.   MRN:  [id number]  Preferred Name  Viji  ED Room #:    Family/Caregiver Present yes     Chief Complaint Shortness of Breath       Restraints no   Sitter no   Sepsis Risk Score    Isolation No active isolations   Fall Risk Assessment Presents to emergency department  because of falls (Syncope, seizure, or loss of consciousness): No, Age > 70: Yes, Altered Mental Status, Intoxication with alcohol or substance confusion (Disorientation, impaired judgment, poor safety awaremess, or inability to follow instructions): No, Impaired Mobility: Ambulates or transfers with assistive devices or assistance; Unable to ambulate or transer.: No, Nursing Judgement: No     Situation  Code Status: Prior No additional code details.    Allergies: Cymbalta [duloxetine hcl] and Sulfa antibiotics  Weight: Patient Vitals for the past 96 hrs (Last 3 readings):   Weight   07/15/25 1046 53.1 kg (117 lb)     Arrived from: home  Hospital Problem/Diagnosis:  Active Problems:    * No active hospital problems. *  Resolved Problems:    * No resolved hospital problems. *    Imaging:   CT CHEST PULMONARY EMBOLISM W CONTRAST   Final Result   1.  No pulmonary embolism.   2.  Mild cardiomegaly with trace pericardial effusion.   3.  Coronary artery calcified lesions are present.      ----------PERT DATA----------      Data reported only if pulmonary embolism is present:      Most central extent of clot:   NA      RV/LV ratio:   NA      ----------PERT DATA----------      Electronically signed by Ricardo Rutherford MD      XR CHEST (2 VW)   Final Result   Moderately enlarged cardiopericardial silhouette, which likely reflects a combination of cardiomegaly and pericardial effusion, as seen on recent CT.      Mild left basilar atelectasis.      Electronically signed by Jaja Cordova DO        Abnormal labs:   Abnormal Labs Reviewed   CBC WITH AUTO DIFFERENTIAL -

## 2025-07-15 NOTE — CONSULTS
CARDIOLOGY CONSULTATION        Patient Name: Viji Gamez  Date of admission: 7/15/2025 10:33 AM  Admission Dx: Hyponatremia [E87.1]  Chest pain [R07.9]  Elevated troponin [R79.89]  Atrial fibrillation with rapid ventricular response (HCC) [I48.91]  Acute dyspnea [R06.00]  Pneumonia of left lower lobe due to infectious organism [J18.9]  Chest pain, unspecified type [R07.9]  Requesting Physician: Ca Reynolds MD  Primary Care physician: Azam De Luna MD    Reason for Consultation/Chief Complaint: Afib RVR    History of Present Illness:     Viji Gamez is a 75 y.o. patient with Pmh of COPD on chronic O2, CAD s/p PCI, Afib with Watchman, CVA, chronic diastolic heart failure, h/o pericardial effusion . Patient presented to the hospital from her pulmonologist office for elevated HR and hypotension.     Cardiology has been consulted for further evaluation of Afib. HS troponin is slightly elevated with flat trend, not consistent with ACS. She has been ruled out for PE.     She notes onset of palpitations as well as some chest tightness.      Past Medical History:   has a past medical history of A-fib (HCC), COPD (chronic obstructive pulmonary disease) (HCC), Diabetes mellitus (HCC), H/O heart artery stent, Hypertension, and Presence of Watchman left atrial appendage closure device.    Surgical History:   has a past surgical history that includes Cataract extraction and back surgery.     Social History:   reports that she has quit smoking. Her smoking use included cigarettes. She started smoking about 25 years ago. She has a 25.5 pack-year smoking history. She has never used smokeless tobacco. She reports current alcohol use. She reports that she does not use drugs.     Family History:  family history is not on file.      Home Medications:  Were reviewed and are listed in nursing record and/or below  Prior to Admission medications    Medication Sig Start Date End Date Taking? Authorizing Provider

## 2025-07-15 NOTE — PROGRESS NOTES
4 Eyes Skin Assessment     NAME:  Viji Gamez  YOB: 1950  MEDICAL RECORD NUMBER:  [id number]    The patient is being assessed for  Admission    I agree that at least one RN has performed a thorough Head to Toe Skin Assessment on the patient. ALL assessment sites listed below have been assessed.      Areas assessed by both nurses:    Head, Face, Ears, Shoulders, Back, Chest, Arms, Elbows, Hands, Sacrum. Buttock, Coccyx, Ischium, Legs. Feet and Heels, and Under Medical Devices         Does the Patient have a Wound? No noted wound(s)       Reza Prevention initiated by RN: Yes  Wound Care Orders initiated by RN: No    Pressure Injury (Stage 1,2,3,4, Unstageable, DTI, NWPT, and Complex wounds) if present, place Wound referral order by RN under : No    New Ostomies, if present place, Ostomy referral order under : No     Nurse 1 eSignature: Electronically signed by Christine Sidhu RN on 7/15/25 at 3:34 PM EDT    **SHARE this note so that the co-signing nurse can place an eSignature**    Nurse 2 eSignature: Electronically signed by Latoya Bahena RN on 7/15/25 at 4:20 PM EDT

## 2025-07-15 NOTE — RT PROTOCOL NOTE
RT Inhaler-Nebulizer Bronchodilator Protocol Note    There is a bronchodilator order in the chart from a provider indicating to follow the RT Bronchodilator Protocol and there is an “Initiate RT Inhaler-Nebulizer Bronchodilator Protocol” order as well (see protocol at bottom of note).    CXR Findings:  No results found.    The findings from the last RT Protocol Assessment were as follows:   History Pulmonary Disease: Chronic pulmonary disease  Respiratory Pattern: Dyspnea on exertion or RR 21-25 bpm  Breath Sounds: Intermittent or unilateral wheezes  Cough: Strong, spontaneous, non-productive  Indication for Bronchodilator Therapy: On home bronchodilators, Wheezing associated with pulm disorder  Bronchodilator Assessment Score: 8  Though Duoneb scores out as TID, will leave as is for now.    Aerosolized bronchodilator medication orders have been revised according to the RT Inhaler-Nebulizer Bronchodilator Protocol below.    Respiratory Therapist to perform RT Therapy Protocol Assessment initially then follow the protocol.  Repeat RT Therapy Protocol Assessment PRN for score 0-3 or on second treatment, BID, and PRN for scores above 3.    No Indications - adjust the frequency to every 6 hours PRN wheezing or bronchospasm, if no treatments needed after 48 hours then discontinue using Per Protocol order mode.     If indication present, adjust the RT bronchodilator orders based on the Bronchodilator Assessment Score as indicated below.  Use Inhaler orders unless patient has one or more of the following: on home nebulizer, not able to hold breath for 10 seconds, is not alert and oriented, cannot activate and use MDI correctly, or respiratory rate 25 breaths per minute or more, then use the equivalent nebulizer order(s) with same Frequency and PRN reasons based on the score.  If a patient is on this medication at home then do not decrease Frequency below that used at home.    0-3 - enter or revise RT bronchodilator

## 2025-07-15 NOTE — ED PROVIDER NOTES
[facility] EMERGENCY DEPARTMENT  EMERGENCY DEPARTMENT ENCOUNTER      Pt Name: Viji Gamez  MRN: [id number]  Birthdate 1950  Date of evaluation: 7/15/2025  Provider: KATHLEEN HERNÁNDEZ DO    CHIEF COMPLAINT       Chief Complaint   Patient presents with    Shortness of Breath         HISTORY OF PRESENT ILLNESS   (Location/Symptom, Timing/Onset, Context/Setting, Quality, Duration, Modifying Factors, Severity)  Note limiting factors.   Viji Gamez is a 75 y.o. female who presents to the emergency department with a complaint of increased shortness of breath since yesterday as well as some midsternal chest pressure.  Chest pressure has been coming and going and returned this morning prompting her to come to the emergency department.  She rates the chest pressure at 3/10 intensity.  She denies any diaphoresis.    She does have baseline shortness of breath from her COPD and wears oxygen 3 L per nasal cannula.  She wears it most of the time.    She does have a chronic cough which is unchanged.  No productive sputum.  She denies any fever or chills.    She denies any nausea vomiting diarrhea or abdominal pain.  No melena hematochezia.  No dysuria or hematuria.  She denies any headache or cold symptoms.    She denies any vision or speech change.  No focal weakness or numbness.  No recent fall trauma or injury.  No exposure to illness.      Medical history significant for atrial fibrillation, hypertension, coronary artery disease with PCI, Watchman procedure, type 2 diabetes, COPD, prior tobacco use.    She does take aspirin 81 mg daily but does not take any other anticoagulants.      Nursing Notes were reviewed.    HPI        REVIEW OF SYSTEMS    (2-9 systems for level 4, 10 or more for level 5)       Constitutional: Negative for fever or chills.     HENT: Negative for rhinorrhea and sore throat.    Eyes: Negative for redness or drainage.     Gastrointestinal: Negative for abdominal pain.      METFORMIN (GLUCOPHAGE-XR) 500 MG EXTENDED RELEASE TABLET    Take 1 tablet by mouth 2 times daily    MOMETASONE-FORMOTEROL (DULERA) 200-5 MCG/ACT INHALER    Inhale 2 puffs into the lungs in the morning and 2 puffs in the evening.    MULTIPLE VITAMINS-MINERALS (THERAPEUTIC MULTIVITAMIN-MINERALS) TABLET    Take 1 tablet by mouth daily    OXYCODONE-ACETAMINOPHEN (PERCOCET) 5-325 MG PER TABLET    TAKE ONE TABLET BY MOUTH EVERY 4 HOURS AS NEEDED FOR PAIN FOR UP TO 30 DAYS    PANTOPRAZOLE (PROTONIX) 40 MG TABLET    Take 1 tablet by mouth daily    ROPINIROLE (REQUIP) 0.25 MG TABLET    Take 1 tablet by mouth in the morning and at bedtime    ROSUVASTATIN (CRESTOR) 40 MG TABLET    Take 1 tablet by mouth every evening    TIOTROPIUM (SPIRIVA) 18 MCG INHALATION CAPSULE    Inhale 1 capsule into the lungs daily    VITAMIN D PO    Take by mouth       ALLERGIES     Cymbalta [duloxetine hcl] and Sulfa antibiotics    FAMILY HISTORY     History reviewed. No pertinent family history.       SOCIAL HISTORY       Social History     Socioeconomic History    Marital status:      Spouse name: None    Number of children: None    Years of education: None    Highest education level: None   Tobacco Use    Smoking status: Former     Current packs/day: 1.00     Average packs/day: 1 pack/day for 25.5 years (25.5 ttl pk-yrs)     Types: Cigarettes     Start date: 1/1/2000    Smokeless tobacco: Never   Vaping Use    Vaping status: Never Used   Substance and Sexual Activity    Alcohol use: Yes     Comment: Patient states she is not sure how much she drinks    Drug use: Never     Social Drivers of Health     Food Insecurity: No Food Insecurity (7/7/2025)    Hunger Vital Sign     Worried About Running Out of Food in the Last Year: Never true     Ran Out of Food in the Last Year: Never true   Transportation Needs: No Transportation Needs (7/7/2025)    PRAPARE - Transportation     Lack of Transportation (Medical): No     Lack of Transportation  Care time was 0 minutes, excluding separately reportable procedures.  There was a high probability of clinically significant/life threatening deterioration in the patient's condition which required my urgent intervention.      CONSULTS:  None    PROCEDURES:  Unless otherwise noted below, none     Procedures        FINAL IMPRESSION      1. Atrial fibrillation with rapid ventricular response (HCC)    2. Chest pain, unspecified type    3. Acute dyspnea    4. Elevated troponin    5. Pneumonia of left lower lobe due to infectious organism    6. Hyponatremia          DISPOSITION/PLAN   DISPOSITION Decision To Admit 07/15/2025 01:34:20 PM      PATIENT REFERRED TO:  No follow-up provider specified.    DISCHARGE MEDICATIONS:  New Prescriptions    No medications on file     Controlled Substances Monitoring:          No data to display                (Please note that portions of this note were completed with a voice recognition program.  Efforts were made to edit the dictations but occasionally words are mis-transcribed.)    ROLANDO HERNÁNDEZ DO (electronically signed)  Attending Emergency Physician            Rolando Hernández DO  07/15/25 2257

## 2025-07-15 NOTE — H&P
V2.0  History and Physical      Name:  Viji Gamez /Age/Sex: 1950  (75 y.o. female)   MRN & CSN:  [id number] & [id number] Encounter Date/Time: 7/15/2025 4:04 PM EDT   Location:  [facility]- PCP: Azam De Luna MD         Chief Complaint: Chest pain, shortness of breath, and dizziness    History of Present Illness:     Viji Gamez is a 75 y.o. female with pmh of peripheral neuropathy, anxiety, hypertension, RLS, hyperlipidemia, history of hip fracture, epilepsy on Keppra, COPD, chronic hypoxic respiratory failure on 3 L baseline, atrial fibrillation with watchman in place, DM, CAD status post PCI, CVA, dementia  who presents with chest pain, shortness of breath, and dizziness.  Patient was recently discharged from hospital on 2025 with pericardial effusion, acute on chronic respiratory failure.  She states she was feeling pretty well at home.  This morning when she woke up she was very dizzy and had episode of diarrhea.  She proceeded to go to her pulmonology appointment and while there she complained of dizziness and systolic blood pressure was found to be 80 and her heart rate was irregular.  Therefore pulmonology NP referred her to the ED for further evaluation.     Patient reports dizziness is improved now.  She has had no more diarrhea since coming into the ER.  She has no chest pain at this time.  She does feel anxious and takes Klonopin at home for this, she is requesting this now.  Patient does endorse dysuria and urgency/frequency.  She denies palpitation, abdominal pain, headache.  No family at bedside.    In the ED, D-dimer was found to be elevated therefore CT PE study was performed, which ruled out PE.  Mild cardiomegaly with trace pericardial effusion, coronary artery calcification lesions are present seen on CT PE study.  Chest x-ray showed atelectasis left lower lobe.  No urinalysis performed.  Significant labs noted to be sodium of 129, creatinine 1.2 with a baseline of  Or  acetaminophen, 650 mg, Q6H PRN  acetaminophen-butalbital, 1 tablet, Q4H PRN  clonazePAM, 0.5 mg, BID PRN  glucose, 4 tablet, PRN  dextrose bolus, 125 mL, PRN   Or  dextrose bolus, 250 mL, PRN  glucagon (rDNA), 1 mg, PRN  dextrose, , Continuous PRN        Labs      CBC:   Recent Labs     07/15/25  1050   WBC 19.4*   HGB 12.3        BMP:    Recent Labs     07/15/25  1050   *   K 4.0   CL 89*   CO2 22   BUN 23*   CREATININE 1.2   GLUCOSE 206*     Hepatic:   Recent Labs     07/15/25  1050   AST 15   ALT 11   BILITOT 0.9   ALKPHOS 106     Lipids: No results found for: \"CHOL\", \"HDL\", \"TRIG\"  Hemoglobin A1C:   Lab Results   Component Value Date/Time    LABA1C 6.6 07/08/2025 05:29 AM     TSH:   Lab Results   Component Value Date/Time    TSH 2.28 07/07/2025 06:50 AM     Troponin: No results found for: \"TROPONINT\"  Lactic Acid: No results for input(s): \"LACTA\" in the last 72 hours.  BNP:   Recent Labs     07/15/25  1050   PROBNP 833*     UA:  Lab Results   Component Value Date/Time    NITRU NEGATIVE 05/24/2025 02:09 PM    COLORU Yellow 05/24/2025 02:09 PM    PHUR 7.0 05/24/2025 02:09 PM    PHUR 5.5 04/06/2024 07:30 AM    WBCUA 0 TO 5 05/24/2025 02:09 PM    RBCUA 0 TO 2 05/24/2025 02:09 PM    BACTERIA 2+ 06/28/2024 12:18 PM    LEUKOCYTESUR NEGATIVE 05/24/2025 02:09 PM    UROBILINOGEN 0.2 05/24/2025 02:09 PM    BILIRUBINUR NEGATIVE 05/24/2025 02:09 PM    GLUCOSEU NEGATIVE 05/24/2025 02:09 PM    KETUA TRACE 05/24/2025 02:09 PM    AMORPHOUS PRESENT 05/24/2025 02:09 PM     Urine Cultures: No results found for: \"LABURIN\"  Blood Cultures: No results found for: \"BC\"  No results found for: \"BLOODCULT2\"  Organism: No results found for: \"ORG\"    Imaging/Diagnostics Last 24 Hours   CT CHEST PULMONARY EMBOLISM W CONTRAST  Result Date: 7/15/2025  EXAM:CT CHEST PULMONARY EMBOLISM W CONTRAST INDICATION: Left basilar opacity, tachycardia, hypoxia COMPARISON: July 7, 2025 TECHNIQUE: Helically acquired axial images were  obtained through the chest with multiplanar reformats and MIPS with IV contrast. Up-to-date CT equipment and radiation dose reduction techniques were employed. FINDINGS: LOWER NECK AND AXILLA: No lymphadenopathy. HEART AND GREAT VESSELS: The heart is mildly enlarged. Thoracic aorta and main pulmonary artery are normal in caliber. No pulmonary embolism. Coronary artery calcification are present. MEDIASTINUM AND MARLENI: No mass or lymphadenopathy. Trace pericardial effusion is present. LUNGS AND AIRWAYS: No focal consolidation. No suspicious pulmonary nodule or mass. Major airways are patent. PLEURA: No pleural effusion or pneumothorax. CHEST WALL AND SOFT TISSUES: Unremarkable. UPPER ABDOMEN: No acute abnormality. BONES: No acute or suspicious abnormality. A remote compression fracture is seen in the mid thoracic spine.     1.  No pulmonary embolism. 2.  Mild cardiomegaly with trace pericardial effusion. 3.  Coronary artery calcified lesions are present. ----------PERT DATA---------- Data reported only if pulmonary embolism is present: Most central extent of clot: NA RV/LV ratio: NA ----------PERT DATA---------- Electronically signed by Ricardo Rutherford MD    XR CHEST (2 VW)  Result Date: 7/15/2025  EXAM: XR CHEST (2 VW). DATE: 7/15/2025 10:46 EDT. INDICATION: Shortness of Breath COMPARISON: CTA chest 7/6/2025 TECHNIQUE: Standard per department protocol. FINDINGS: Medical devices: None Moderately enlarged cardiopericardial silhouette, as seen on prior CT. No pulmonary vascular congestion. Mild left basilar airspace opacity. Right lung is clear. No pneumothorax or pleural effusion. Bony thorax appears intact.     Moderately enlarged cardiopericardial silhouette, which likely reflects a combination of cardiomegaly and pericardial effusion, as seen on recent CT. Mild left basilar atelectasis. Electronically signed by Jaja Cordova DO        Electronically signed by MONTSERRAT Phillips CNP on 7/15/2025 at 4:04 PM

## 2025-07-16 ENCOUNTER — APPOINTMENT (OUTPATIENT)
Age: 75
DRG: 720 | End: 2025-07-16
Payer: COMMERCIAL

## 2025-07-16 LAB
ANION GAP SERPL CALCULATED.3IONS-SCNC: 16 MMOL/L (ref 3–16)
BASOPHILS # BLD: 0.07 K/UL (ref 0–0.2)
BASOPHILS NFR BLD: 0 %
BUN SERPL-MCNC: 29 MG/DL (ref 7–20)
CALCIUM SERPL-MCNC: 8.5 MG/DL (ref 8.3–10.6)
CHLORIDE SERPL-SCNC: 90 MMOL/L (ref 99–110)
CO2 SERPL-SCNC: 18 MMOL/L (ref 21–32)
COHGB MFR BLD: 1.2 % (ref 0–1.5)
CREAT SERPL-MCNC: 1.3 MG/DL (ref 0.6–1.2)
EKG DIAGNOSIS: NORMAL
EKG Q-T INTERVAL: 328 MS
EKG QRS DURATION: 78 MS
EKG QTC CALCULATION (BAZETT): 431 MS
EKG R AXIS: 66 DEGREES
EKG T AXIS: 86 DEGREES
EKG VENTRICULAR RATE: 104 BPM
EOSINOPHIL # BLD: 0.2 K/UL (ref 0–0.6)
EOSINOPHILS RELATIVE PERCENT: 1 %
ERYTHROCYTE [DISTWIDTH] IN BLOOD BY AUTOMATED COUNT: 13 % (ref 12.4–15.4)
EST. AVERAGE GLUCOSE BLD GHB EST-MCNC: 140 MG/DL
GFR, ESTIMATED: 42 ML/MIN/1.73M2
GLUCOSE BLD-MCNC: 136 MG/DL (ref 70–99)
GLUCOSE BLD-MCNC: 194 MG/DL (ref 70–99)
GLUCOSE BLD-MCNC: 266 MG/DL (ref 70–99)
GLUCOSE BLD-MCNC: 307 MG/DL (ref 70–99)
GLUCOSE SERPL-MCNC: 158 MG/DL (ref 70–99)
HBA1C MFR BLD: 6.5 %
HCO3 VENOUS: 23.1 MMOL/L (ref 23–29)
HCT VFR BLD AUTO: 35.2 % (ref 36–48)
HGB BLD-MCNC: 11.4 G/DL (ref 12–16)
IMM GRANULOCYTES # BLD AUTO: 0.17 K/UL (ref 0–0.5)
IMM GRANULOCYTES NFR BLD: 1 %
LYMPHOCYTES NFR BLD: 0.74 K/UL (ref 1–5.1)
LYMPHOCYTES RELATIVE PERCENT: 4 %
MCH RBC QN AUTO: 29.3 PG (ref 26–34)
MCHC RBC AUTO-ENTMCNC: 32.4 G/DL (ref 31–36)
MCV RBC AUTO: 90.5 FL (ref 80–100)
METHEMOGLOBIN: 0.4 % (ref 0–1.4)
MONOCYTES NFR BLD: 1.89 K/UL (ref 0–1.3)
MONOCYTES NFR BLD: 10 %
NEGATIVE BASE EXCESS, VEN: 0.7 MMOL/L (ref 0–3)
NEUTROPHILS NFR BLD: 84 %
NEUTS SEG NFR BLD: 16.59 K/UL (ref 1.7–7.7)
O2 SAT, VEN: 98.7 %
OSMOLALITY UR: 330 MOSM/KG (ref 390–1070)
PCO2 VENOUS: 34.9 MM HG (ref 40–50)
PH VENOUS: 7.44 (ref 7.35–7.45)
PLATELET # BLD AUTO: 299 K/UL (ref 135–450)
PMV BLD AUTO: 10.7 FL
PO2 VENOUS: 187.3 MM HG
POTASSIUM SERPL-SCNC: 4.3 MMOL/L (ref 3.5–5.1)
POTASSIUM, UR: 47.6 MMOL/L
RBC # BLD AUTO: 3.89 M/UL (ref 4–5.2)
SODIUM SERPL-SCNC: 123 MMOL/L (ref 136–145)
SODIUM SERPL-SCNC: 123 MMOL/L (ref 136–145)
SODIUM SERPL-SCNC: 125 MMOL/L (ref 136–145)
SODIUM UR-SCNC: <20 MMOL/L
WBC OTHER # BLD: 19.7 K/UL (ref 4–11)

## 2025-07-16 PROCEDURE — 83935 ASSAY OF URINE OSMOLALITY: CPT

## 2025-07-16 PROCEDURE — 84300 ASSAY OF URINE SODIUM: CPT

## 2025-07-16 PROCEDURE — 2700000000 HC OXYGEN THERAPY PER DAY

## 2025-07-16 PROCEDURE — 2580000003 HC RX 258: Performed by: STUDENT IN AN ORGANIZED HEALTH CARE EDUCATION/TRAINING PROGRAM

## 2025-07-16 PROCEDURE — 6370000000 HC RX 637 (ALT 250 FOR IP): Performed by: NURSE PRACTITIONER

## 2025-07-16 PROCEDURE — 51702 INSERT TEMP BLADDER CATH: CPT

## 2025-07-16 PROCEDURE — 82962 GLUCOSE BLOOD TEST: CPT

## 2025-07-16 PROCEDURE — 74176 CT ABD & PELVIS W/O CONTRAST: CPT

## 2025-07-16 PROCEDURE — 99291 CRITICAL CARE FIRST HOUR: CPT | Performed by: INTERNAL MEDICINE

## 2025-07-16 PROCEDURE — 85025 COMPLETE CBC W/AUTO DIFF WBC: CPT

## 2025-07-16 PROCEDURE — 94761 N-INVAS EAR/PLS OXIMETRY MLT: CPT

## 2025-07-16 PROCEDURE — G0378 HOSPITAL OBSERVATION PER HR: HCPCS

## 2025-07-16 PROCEDURE — 2500000003 HC RX 250 WO HCPCS: Performed by: NURSE PRACTITIONER

## 2025-07-16 PROCEDURE — 2580000003 HC RX 258: Performed by: INTERNAL MEDICINE

## 2025-07-16 PROCEDURE — 2500000003 HC RX 250 WO HCPCS: Performed by: INTERNAL MEDICINE

## 2025-07-16 PROCEDURE — 83036 HEMOGLOBIN GLYCOSYLATED A1C: CPT

## 2025-07-16 PROCEDURE — 6370000000 HC RX 637 (ALT 250 FOR IP): Performed by: STUDENT IN AN ORGANIZED HEALTH CARE EDUCATION/TRAINING PROGRAM

## 2025-07-16 PROCEDURE — 96368 THER/DIAG CONCURRENT INF: CPT

## 2025-07-16 PROCEDURE — 96366 THER/PROPH/DIAG IV INF ADDON: CPT

## 2025-07-16 PROCEDURE — 94640 AIRWAY INHALATION TREATMENT: CPT

## 2025-07-16 PROCEDURE — 6360000002 HC RX W HCPCS: Performed by: NURSE PRACTITIONER

## 2025-07-16 PROCEDURE — 2060000000 HC ICU INTERMEDIATE R&B

## 2025-07-16 PROCEDURE — 84295 ASSAY OF SERUM SODIUM: CPT

## 2025-07-16 PROCEDURE — 6370000000 HC RX 637 (ALT 250 FOR IP): Performed by: INTERNAL MEDICINE

## 2025-07-16 PROCEDURE — 36415 COLL VENOUS BLD VENIPUNCTURE: CPT

## 2025-07-16 PROCEDURE — 6360000002 HC RX W HCPCS: Performed by: STUDENT IN AN ORGANIZED HEALTH CARE EDUCATION/TRAINING PROGRAM

## 2025-07-16 PROCEDURE — 84133 ASSAY OF URINE POTASSIUM: CPT

## 2025-07-16 PROCEDURE — 80048 BASIC METABOLIC PNL TOTAL CA: CPT

## 2025-07-16 PROCEDURE — 82805 BLOOD GASES W/O2 SATURATION: CPT

## 2025-07-16 RX ORDER — SODIUM CHLORIDE 9 MG/ML
INJECTION, SOLUTION INTRAVENOUS PRN
Status: DISCONTINUED | OUTPATIENT
Start: 2025-07-16 | End: 2025-07-19 | Stop reason: HOSPADM

## 2025-07-16 RX ORDER — ENOXAPARIN SODIUM 100 MG/ML
30 INJECTION SUBCUTANEOUS DAILY
Status: DISCONTINUED | OUTPATIENT
Start: 2025-07-17 | End: 2025-07-19

## 2025-07-16 RX ORDER — GABAPENTIN 300 MG/1
300 CAPSULE ORAL 2 TIMES DAILY
Status: DISCONTINUED | OUTPATIENT
Start: 2025-07-16 | End: 2025-07-19

## 2025-07-16 RX ORDER — METOPROLOL TARTRATE 25 MG/1
12.5 TABLET, FILM COATED ORAL 2 TIMES DAILY
Status: DISCONTINUED | OUTPATIENT
Start: 2025-07-16 | End: 2025-07-17

## 2025-07-16 RX ORDER — CLONAZEPAM 1 MG/1
1 TABLET ORAL 2 TIMES DAILY PRN
Status: DISCONTINUED | OUTPATIENT
Start: 2025-07-16 | End: 2025-07-19 | Stop reason: HOSPADM

## 2025-07-16 RX ORDER — SODIUM CHLORIDE 0.9 % (FLUSH) 0.9 %
5-40 SYRINGE (ML) INJECTION PRN
Status: DISCONTINUED | OUTPATIENT
Start: 2025-07-16 | End: 2025-07-19 | Stop reason: HOSPADM

## 2025-07-16 RX ORDER — SODIUM CHLORIDE 0.9 % (FLUSH) 0.9 %
5-40 SYRINGE (ML) INJECTION EVERY 12 HOURS SCHEDULED
Status: DISCONTINUED | OUTPATIENT
Start: 2025-07-16 | End: 2025-07-19 | Stop reason: HOSPADM

## 2025-07-16 RX ORDER — ROSUVASTATIN CALCIUM 10 MG/1
10 TABLET, COATED ORAL EVERY EVENING
Status: DISCONTINUED | OUTPATIENT
Start: 2025-07-16 | End: 2025-07-19

## 2025-07-16 RX ORDER — LIDOCAINE HYDROCHLORIDE 10 MG/ML
50 INJECTION, SOLUTION EPIDURAL; INFILTRATION; INTRACAUDAL; PERINEURAL ONCE
Status: DISCONTINUED | OUTPATIENT
Start: 2025-07-16 | End: 2025-07-19 | Stop reason: HOSPADM

## 2025-07-16 RX ADMIN — ROPINIROLE HYDROCHLORIDE 0.25 MG: 0.25 TABLET, FILM COATED ORAL at 08:38

## 2025-07-16 RX ADMIN — LEVETIRACETAM 500 MG: 500 TABLET, FILM COATED ORAL at 08:38

## 2025-07-16 RX ADMIN — GABAPENTIN 400 MG: 400 CAPSULE ORAL at 08:38

## 2025-07-16 RX ADMIN — INSULIN LISPRO 1 UNITS: 100 INJECTION, SOLUTION INTRAVENOUS; SUBCUTANEOUS at 18:20

## 2025-07-16 RX ADMIN — ACETAMINOPHEN 650 MG: 325 TABLET ORAL at 21:27

## 2025-07-16 RX ADMIN — INSULIN LISPRO 2 UNITS: 100 INJECTION, SOLUTION INTRAVENOUS; SUBCUTANEOUS at 12:54

## 2025-07-16 RX ADMIN — Medication 10 ML: at 20:35

## 2025-07-16 RX ADMIN — IPRATROPIUM BROMIDE AND ALBUTEROL SULFATE 1 DOSE: 2.5; .5 SOLUTION RESPIRATORY (INHALATION) at 12:03

## 2025-07-16 RX ADMIN — ACETAMINOPHEN 650 MG: 325 TABLET ORAL at 16:44

## 2025-07-16 RX ADMIN — ENOXAPARIN SODIUM 40 MG: 100 INJECTION SUBCUTANEOUS at 14:31

## 2025-07-16 RX ADMIN — FERROUS SULFATE TAB 325 MG (65 MG ELEMENTAL FE) 325 MG: 325 (65 FE) TAB at 08:39

## 2025-07-16 RX ADMIN — LEVETIRACETAM 500 MG: 500 TABLET, FILM COATED ORAL at 21:27

## 2025-07-16 RX ADMIN — CEFEPIME 1000 MG: 1 INJECTION, POWDER, FOR SOLUTION INTRAMUSCULAR; INTRAVENOUS at 07:26

## 2025-07-16 RX ADMIN — IPRATROPIUM BROMIDE AND ALBUTEROL SULFATE 1 DOSE: 2.5; .5 SOLUTION RESPIRATORY (INHALATION) at 16:37

## 2025-07-16 RX ADMIN — SODIUM BICARBONATE: 84 INJECTION, SOLUTION INTRAVENOUS at 11:41

## 2025-07-16 RX ADMIN — INSULIN LISPRO 3 UNITS: 100 INJECTION, SOLUTION INTRAVENOUS; SUBCUTANEOUS at 20:35

## 2025-07-16 RX ADMIN — GABAPENTIN 300 MG: 300 CAPSULE ORAL at 21:27

## 2025-07-16 RX ADMIN — IPRATROPIUM BROMIDE AND ALBUTEROL SULFATE 1 DOSE: 2.5; .5 SOLUTION RESPIRATORY (INHALATION) at 20:40

## 2025-07-16 RX ADMIN — ASPIRIN 81 MG: 81 TABLET, COATED ORAL at 08:39

## 2025-07-16 RX ADMIN — ROSUVASTATIN CALCIUM 10 MG: 10 TABLET, FILM COATED ORAL at 16:44

## 2025-07-16 RX ADMIN — ARFORMOTEROL TARTRATE: 15 SOLUTION RESPIRATORY (INHALATION) at 07:28

## 2025-07-16 RX ADMIN — PANTOPRAZOLE SODIUM 40 MG: 40 TABLET, DELAYED RELEASE ORAL at 05:41

## 2025-07-16 RX ADMIN — ARFORMOTEROL TARTRATE: 15 SOLUTION RESPIRATORY (INHALATION) at 20:40

## 2025-07-16 RX ADMIN — METOPROLOL TARTRATE 12.5 MG: 25 TABLET, FILM COATED ORAL at 14:22

## 2025-07-16 RX ADMIN — Medication 1 TABLET: at 08:38

## 2025-07-16 RX ADMIN — CLONAZEPAM 1 MG: 1 TABLET ORAL at 15:39

## 2025-07-16 RX ADMIN — CEFEPIME 1000 MG: 1 INJECTION, POWDER, FOR SOLUTION INTRAMUSCULAR; INTRAVENOUS at 14:29

## 2025-07-16 RX ADMIN — Medication 10 ML: at 08:39

## 2025-07-16 RX ADMIN — ROPINIROLE HYDROCHLORIDE 0.25 MG: 0.25 TABLET, FILM COATED ORAL at 21:27

## 2025-07-16 RX ADMIN — TIOTROPIUM BROMIDE INHALATION SPRAY 5 MCG: 3.12 SPRAY, METERED RESPIRATORY (INHALATION) at 07:28

## 2025-07-16 RX ADMIN — DONEPEZIL HYDROCHLORIDE 5 MG: 5 TABLET, FILM COATED ORAL at 21:27

## 2025-07-16 RX ADMIN — IPRATROPIUM BROMIDE AND ALBUTEROL SULFATE 1 DOSE: 2.5; .5 SOLUTION RESPIRATORY (INHALATION) at 07:29

## 2025-07-16 RX ADMIN — ACETAMINOPHEN 650 MG: 325 TABLET ORAL at 08:38

## 2025-07-16 ASSESSMENT — PAIN SCALES - WONG BAKER
WONGBAKER_NUMERICALRESPONSE: NO HURT

## 2025-07-16 ASSESSMENT — PAIN DESCRIPTION - DESCRIPTORS
DESCRIPTORS: ACHING
DESCRIPTORS: SHARP

## 2025-07-16 ASSESSMENT — PAIN SCALES - GENERAL
PAINLEVEL_OUTOF10: 6
PAINLEVEL_OUTOF10: 0
PAINLEVEL_OUTOF10: 0
PAINLEVEL_OUTOF10: 8
PAINLEVEL_OUTOF10: 0
PAINLEVEL_OUTOF10: 3

## 2025-07-16 ASSESSMENT — PAIN DESCRIPTION - LOCATION
LOCATION: HEAD
LOCATION: NECK

## 2025-07-16 ASSESSMENT — PAIN DESCRIPTION - ORIENTATION
ORIENTATION: MID
ORIENTATION: POSTERIOR

## 2025-07-16 ASSESSMENT — PAIN DESCRIPTION - PAIN TYPE: TYPE: CHRONIC PAIN

## 2025-07-16 ASSESSMENT — PAIN DESCRIPTION - ONSET: ONSET: GRADUAL

## 2025-07-16 ASSESSMENT — PAIN DESCRIPTION - FREQUENCY: FREQUENCY: INTERMITTENT

## 2025-07-16 NOTE — PROGRESS NOTES
[facility]    Pharmacy Renal Dosing Communication    Cefepime ordered for treatment of bloodstream infection. Per [facility] Renal Dose Adjustment Policy, cefepime will be changed to 1000 mg IV every 12 hours EI.     Estimated Creatinine Clearance: Estimated Creatinine Clearance: 28 mL/min (A) (based on SCr of 1.3 mg/dL (H)).  Dialysis Status, ARMIN, CKD: ARMIN  BMI: Body mass index is 23.32 kg/m².    Rationale for Adjustment: Agent is renally eliminated and demonstrates time-dependent effect on bacterial eradication. Extended-infusion dosing strategy aims to enhance microbiologic and clinical efficacy.    Pharmacy will continue to monitor renal function, cultures and sensitivities (where available) and adjust dose as necessary.      Please call with any questions.    Lincoln Cardona, Theresa  [facility]   [id number]  7/16/2025   2:42 PM

## 2025-07-16 NOTE — PROGRESS NOTES
Hospitalist Progress Note      PCP: Azam De Luna MD    Date of Admission: 7/15/2025    Chief Complaint: Chest pain, shortness of breath, dizziness    Hospital Course: Viji Gamez is a 75 y.o. female with pmh of peripheral neuropathy, anxiety, hypertension, RLS, hyperlipidemia, history of hip fracture, epilepsy on Keppra, COPD, chronic hypoxic respiratory failure on 3 L baseline, atrial fibrillation with watchman in place, DM, CAD status post PCI, CVA, dementia  who presents with chest pain, shortness of breath, and dizziness.  Patient was recently discharged from hospital on 7/9/2025 with pericardial effusion, acute on chronic respiratory failure.  She states she was feeling pretty well at home.  This morning when she woke up she was very dizzy and had episode of diarrhea.  She proceeded to go to her pulmonology appointment and while there she complained of dizziness and systolic blood pressure was found to be 80 and her heart rate was irregular.  Therefore pulmonology NP referred her to the ED for further evaluation.  Patient was found to have UTI and developed sepsis in hospital.  Cardiology was consulted for A-fib and she was placed on Cardizem drip.  See further details below.        Subjective: Patient laying in bed, she had a rough night in morning.  She had fever this morning, did not sleep well, feels fatigued.  Dizziness is better though.  Denies chest pain or shortness of breath at this time.  Reviewed plan of care, denies further needs or questions.    Called pt sister She, and updated on POC. Denied further questions.     Assessment/Plan:    Chest pain  A-fib RVR, status post Watchman device  - Troponin mildly elevated and trending flat  - Placed on telemetry  - Consult cardiology, placed on Cardizem drip.  DC Norvasc and start metoprolol 12.5 twice daily.   - Patient is not on any rate controlling drugs nor anticoagulation at home    Sepsis, occurred in hospital 7/16/2025  -Patient

## 2025-07-16 NOTE — PROGRESS NOTES
Arrived to place midline after chart review. Chart review revealed that nephrology consult had been ordered for pt. Approval granted from nephrologist Dr Lopez for midline in dominant arm. Pre-procedure and timeout done with ESTRADA Mccormick. Discussed limitations of placement and allergies. Procedure explained to pt, including risks and benefits. All questions answered. Pt verbalized understanding.      Vessels easily collapsible upon assessment. No difficulty accessing R brachial vein. Blood free flowing and non-pulsatile. Guidewire, introducer, and catheter all easily inserted. Midline has brisk blood return and flushes easily.     OK to use midline.     Patient tolerated sterile procedure well. Bed left in low position with belongings and call light within reach. Educated on line care. Handoff to bedside RN.      Please call with any questions or concerns. The  will direct you to the PICC RN that is on call.      [phone]

## 2025-07-16 NOTE — PROGRESS NOTES
CARDIOLOGY PROGRESS NOTE        Patient Name: Viji Gamez  Date of admission: 7/15/2025 10:33 AM  Admission Dx: Hyponatremia [E87.1]  Chest pain [R07.9]  Elevated troponin [R79.89]  Atrial fibrillation with rapid ventricular response (HCC) [I48.91]  Acute dyspnea [R06.00]  Pneumonia of left lower lobe due to infectious organism [J18.9]  Chest pain, unspecified type [R07.9]  Requesting Physician: Ca Reynolds MD  Primary Care physician: Azam De Luna MD    Reason for Consultation/Chief Complaint: Afib RVR    History of Present Illness:     Viji Gamez is a 75 y.o. patient with Pmh of COPD on chronic O2, CAD s/p PCI, Afib with Watchman, CVA, chronic diastolic heart failure, h/o pericardial effusion . Patient presented to the hospital from her pulmonologist office for elevated HR and hypotension.     Patient reports no chest pain and her breathing has improved. She does state that she gets her medications in pill packs and is compliant with them.         Past Medical History:   has a past medical history of A-fib (HCC), COPD (chronic obstructive pulmonary disease) (HCC), Diabetes mellitus (HCC), H/O heart artery stent, Hypertension, and Presence of Watchman left atrial appendage closure device.    Surgical History:   has a past surgical history that includes Cataract extraction and back surgery.     Social History:   reports that she has quit smoking. Her smoking use included cigarettes. She started smoking about 25 years ago. She has a 25.5 pack-year smoking history. She has never used smokeless tobacco. She reports current alcohol use. She reports that she does not use drugs.     Family History:  family history is not on file.      Home Medications:  Were reviewed and are listed in nursing record and/or below  Prior to Admission medications    Medication Sig Start Date End Date Taking? Authorizing Provider   VITAMIN D PO Take by mouth   Yes Provider, MD Sunny   DULoxetine (CYMBALTA)  neb solution, BID RT  therapeutic multivitamin-minerals 1 tablet, Daily  glucose chewable tablet 16 g, PRN  dextrose bolus 10% 125 mL, PRN   Or  dextrose bolus 10% 250 mL, PRN  glucagon injection 1 mg, PRN  dextrose 10 % infusion, Continuous PRN  insulin lispro (HUMALOG,ADMELOG) injection vial 0-4 Units, 4x Daily AC & HS  pantoprazole (PROTONIX) tablet 40 mg, QAM AC  rOPINIRole (REQUIP) tablet 0.25 mg, BID  rosuvastatin (CRESTOR) tablet 40 mg, QPM  tiotropium (SPIRIVA RESPIMAT) 2.5 MCG/ACT inhaler 5 mcg, Daily RT  dilTIAZem HCl-Sodium Chloride 125 mg / 125 mL infusion, Continuous  ipratropium 0.5 mg-albuterol 2.5 mg (DUONEB) nebulizer solution 1 Dose, Q4H PRN  enoxaparin (LOVENOX) injection 40 mg, Daily  oxyCODONE (ROXICODONE) immediate release tablet 5 mg, Q4H PRN   Or  oxyCODONE (ROXICODONE) immediate release tablet 10 mg, Q4H PRN        Allergies:  Cymbalta [duloxetine hcl] and Sulfa antibiotics     Review of Systems:   A 14 point review of symptoms completed. Pertinent positives identified in the HPI, all other review of symptoms negative as below.      Objective:     Vitals:    07/16/25 0602   BP: 96/72   Pulse: (!) 103   Resp:    Temp:    SpO2:     Weight - Scale: 54.2 kg (119 lb 6.4 oz)       PHYSICAL EXAM:      General:  Alert, cooperative, no distress, appears stated age   Head:  Normocephalic, atraumatic   Eyes:  Conjunctiva/corneas clear, anicteric sclerae    Nose: Nares normal, no drainage    Throat: No abnormalities of the lips, oral mucosa or tongue.    Neck: Trachea midline. Neck supple with no lymphadenopathy, no tenderness/mass/nodules, no Jugular venous pressure elevation    Lungs:   Clear to auscultation bilaterally, no wheezes, no rales, no respiratory distress   Chest Wall:  No deformity or tenderness to palpation   Heart:  IRIR S1 S2, 2/6 systolic murmur, no rub   Abdomen:   Soft, non-tender, with normoactive bowel sounds. No masses, no hepatosplenomegaly   Extremities: No cyanosis, clubbing or

## 2025-07-16 NOTE — PROGRESS NOTES
07/16/25 1422   Encounter Summary   Encounter Overview/Reason Attempted Encounter   Service Provided For Patient not available   Referral/Consult From Nurse   Last Encounter  07/16/25  (Patient was asleep/CK)

## 2025-07-16 NOTE — CARE COORDINATION
Case Management Assessment  Initial Evaluation    Date/Time of Evaluation: 7/16/2025 11:51 AM  Assessment Completed by: Pallavi Navarrete RN    If patient is discharged prior to next notation, then this note serves as note for discharge by case management.    Patient Name: Viji Gamez                   YOB: 1950  Diagnosis: Hyponatremia [E87.1]  Chest pain [R07.9]  Elevated troponin [R79.89]  Atrial fibrillation with rapid ventricular response (HCC) [I48.91]  Acute dyspnea [R06.00]  Pneumonia of left lower lobe due to infectious organism [J18.9]  Chest pain, unspecified type [R07.9]  Sepsis (HCC) [A41.9]                   Date / Time: 7/15/2025 10:33 AM    Patient Admission Status: Inpatient   Readmission Risk (Low < 19, Mod (19-27), High > 27): Readmission Risk Score: 14.5    Current PCP: Azam De Luna MD  PCP verified by CM? (P) No    Chart Reviewed: Yes      History Provided by: (P) Patient  Patient Orientation: (P) Alert and Oriented    Patient Cognition: (P) Alert    Hospitalization in the last 30 days (Readmission):  Yes    If yes, Readmission Assessment in  Navigator will be completed.    Advance Directives:      Code Status: Full Code   Patient's Primary Decision Maker is: Legal Next of Kin      Discharge Planning:    Patient lives with: (P) Alone Type of Home: (P) Apartment  Primary Care Giver: (P) Self  Patient Support Systems include: (P) Other (Comment)   Current Financial resources: (P) Medicaid  Current community resources: (P) None  Current services prior to admission: (P) Oxygen Therapy, Durable Medical Equipment            Current DME: (P) Oxygen Therapy (Comment), Shower Chair, Walker, Other (Comment) (rollator, grab bars)            Type of Home Care services:  (P) Aide Services, Meals on Wheels, OT, PT, Nursing Services    ADLS  Prior functional level: (P) Independent in ADLs/IADLs  Current functional level: (P) Independent in ADLs/IADLs    PT AM-PAC:   /24  OT AM-PAC:

## 2025-07-16 NOTE — PROGRESS NOTES
[facility]    Pharmacy Renal Dose-Adjustment Communication     Rosuvastatin 40 mg daily and gabapentin 400 mg twice daily ordered for patient.  This medication is renally eliminated.  Will change to rosuvastatin 10 mg daily and gabapentin 300 mg twice daily per renal dose adjustment policy.     Estimated Creatinine Clearance: 28 mL/min (A) (based on SCr of 1.3 mg/dL (H)).     Pharmacy will continue to monitor renal function and adjust dose as necessary.    Please call with any questions.    Thanks!    Lincoln Cardona, Theresa  [facility]   [id number]  7/16/2025   2:45 PM

## 2025-07-16 NOTE — PROGRESS NOTES
Pharmacist Review and Automatic Dose Adjustment of Prophylactic Enoxaparin    The reviewing pharmacist has made an adjustment to the ordered enoxaparin dose or converted to UFH per the approved [facility] protocol and table as identified below.        Viji Gamez is a 75 y.o. female.     Recent Labs     07/15/25  1050 07/16/25  0449   CREATININE 1.2 1.3*       Estimated Creatinine Clearance: 28 mL/min (A) (based on SCr of 1.3 mg/dL (H)).    Recent Labs     07/15/25  1050 07/16/25  0449   HGB 12.3 11.4*   HCT 38.1 35.2*    299     No results for input(s): \"INR\" in the last 72 hours.    Height:   Ht Readings from Last 1 Encounters:   07/15/25 1.524 m (5')     Weight:  Wt Readings from Last 1 Encounters:   07/15/25 54.2 kg (119 lb 6.4 oz)       Plan: Based upon the patient's weight and renal function, the ordered enoxaparin dose of 40 mg daily has been changed/converted to 30 mg daily.      Thank you,    Lincoln Cardona, PharmD  [facility]   [id number]  7/16/2025   2:46 PM

## 2025-07-16 NOTE — PROGRESS NOTES
Bedside report and transfer of care given to ESTRADA Klein. Pt currently resting in bed with the call light within reach. Pt denies any other care needs at this time. Pt stable at this time.      Latoya Bahena RN

## 2025-07-16 NOTE — PLAN OF CARE
Problem: Chronic Conditions and Co-morbidities  Goal: Patient's chronic conditions and co-morbidity symptoms are monitored and maintained or improved  Outcome: Progressing  Flowsheets (Taken 7/15/2025 2359)  Care Plan - Patient's Chronic Conditions and Co-Morbidity Symptoms are Monitored and Maintained or Improved:   Monitor and assess patient's chronic conditions and comorbid symptoms for stability, deterioration, or improvement   Collaborate with multidisciplinary team to address chronic and comorbid conditions and prevent exacerbation or deterioration   Update acute care plan with appropriate goals if chronic or comorbid symptoms are exacerbated and prevent overall improvement and discharge     Problem: Discharge Planning  Goal: Discharge to home or other facility with appropriate resources  Outcome: Progressing  Flowsheets (Taken 7/15/2025 2359)  Discharge to home or other facility with appropriate resources:   Identify discharge learning needs (meds, wound care, etc)   Identify barriers to discharge with patient and caregiver   Arrange for needed discharge resources and transportation as appropriate     Problem: Pain  Goal: Verbalizes/displays adequate comfort level or baseline comfort level  Outcome: Progressing  Flowsheets (Taken 7/15/2025 2359)  Verbalizes/displays adequate comfort level or baseline comfort level:   Encourage patient to monitor pain and request assistance   Administer analgesics based on type and severity of pain and evaluate response   Consider cultural and social influences on pain and pain management   Assess pain using appropriate pain scale   Implement non-pharmacological measures as appropriate and evaluate response   Notify Licensed Independent Practitioner if interventions unsuccessful or patient reports new pain     Problem: Safety - Adult  Goal: Free from fall injury  Outcome: Progressing  Flowsheets (Taken 7/15/2025 2359)  Free From Fall Injury: Instruct family/caregiver on

## 2025-07-16 NOTE — CONSULTS
[facility]  Consultation Note    Reason for Consult: ARMIN, acute on chronic hyponatremia  Requesting Physician:  EZIO Torres    CHIEF COMPLAINT: Shortness of breath and dizziness    History obtained from records and patient.    HISTORY OF PRESENT ILLNESS:                Viji Gamez  is 75 y.o. y.o. female with significant past medical history of hyponatremia, peripheral neuropathy, hypertension, seizures, on Keppra, anxiety, depression, COPD, diabetes mellitus type 2, coronary disease, CVA, dementia who presents with shortness of breath and dizziness.  Noticed to be on rapid atrial fibrillation.  Currently on diltiazem drip.  I am asked to see the patient since creatinine is up to 1.3 from 1.2 yesterday and baseline of 0.6.  Sodium also low at 123 with serum bicarbonate of 18 and anion gap of 16.  Lowest SBP in the 90s range.  Denies any nausea and or vomiting.  No diarrhea.  Takes about 4 tablets of ibuprofen daily.  On statin, PPI and alendronate as an outpatient.  No other complaints    Past Medical History:     has a past medical history of A-fib (HCC), COPD (chronic obstructive pulmonary disease) (HCC), Diabetes mellitus (HCC), H/O heart artery stent, Hypertension, and Presence of Watchman left atrial appendage closure device.   Past Surgical History:     has a past surgical history that includes Cataract extraction and back surgery.   Current Medications:    Current Facility-Administered Medications: cefepime (MAXIPIME) 1,000 mg in sodium chloride 0.9 % 50 mL IVPB (addEASE), 1,000 mg, IntraVENous, Q8H  sodium chloride flush 0.9 % injection 5-40 mL, 5-40 mL, IntraVENous, 2 times per day  sodium chloride flush 0.9 % injection 5-40 mL, 5-40 mL, IntraVENous, PRN  0.9 % sodium chloride infusion, , IntraVENous, PRN  lidocaine PF 1 % injection 50 mg, 50 mg, IntraDERmal, Once  sodium chloride flush 0.9 % injection 5-40 mL, 5-40 mL, IntraVENous, 2 times per day  sodium chloride  hours.  Sodium every 6 hours.  Not to exceed sodium of 131 over 24 hours.  2.  Hyponatremia-check urine studies.  In the setting of hypotension.  Follow with sodium bicarb infusion  3.  Low serum bicarbonate-positive anion gap.  Lactic acid WNL.  Check venous blood gas.  4.  Hypotension-in the setting of rapid atrial fibrillation.  5.  UTI-on cefepime  6.  Rapid atrial fibrillation-on diltiazem drip  7.  History of seizures-on Keppra  8.  History of COPD  9.  Hyperlipidemia-on statin.  Check CK level  10.  History of coronary disease  11.  Dementia    Okay to place midline on dominant arm    Thank you for the consult.  We will follow this patient along the hospitalization.    Amari Lopez MD

## 2025-07-17 PROBLEM — B96.20 E. COLI UTI: Status: ACTIVE | Noted: 2025-07-17

## 2025-07-17 PROBLEM — N39.0 E. COLI UTI: Status: ACTIVE | Noted: 2025-07-17

## 2025-07-17 PROBLEM — R79.89 ELEVATED TROPONIN: Status: ACTIVE | Noted: 2025-07-17

## 2025-07-17 PROBLEM — R50.9 FEVER AND CHILLS: Status: ACTIVE | Noted: 2025-07-17

## 2025-07-17 PROBLEM — G40.909 SEIZURE DISORDER (HCC): Status: ACTIVE | Noted: 2025-07-17

## 2025-07-17 PROBLEM — I95.9 ACUTE HYPOTENSION: Status: ACTIVE | Noted: 2025-07-17

## 2025-07-17 PROBLEM — I95.9 HYPOTENSION: Status: ACTIVE | Noted: 2025-07-17

## 2025-07-17 PROBLEM — R65.20 SEVERE SEPSIS WITH ACUTE ORGAN DYSFUNCTION DUE TO GRAM NEGATIVE BACTERIA (HCC): Status: ACTIVE | Noted: 2025-07-17

## 2025-07-17 PROBLEM — Z78.9 ON SUPPLEMENTAL OXYGEN BY NASAL CANNULA: Status: ACTIVE | Noted: 2025-07-17

## 2025-07-17 PROBLEM — R33.9 URINARY RETENTION: Status: ACTIVE | Noted: 2025-07-17

## 2025-07-17 PROBLEM — A41.50 SEVERE SEPSIS WITH ACUTE ORGAN DYSFUNCTION DUE TO GRAM NEGATIVE BACTERIA (HCC): Status: ACTIVE | Noted: 2025-07-17

## 2025-07-17 PROBLEM — R73.9 HYPERGLYCEMIA: Status: ACTIVE | Noted: 2025-07-17

## 2025-07-17 PROBLEM — B96.20 E COLI BACTEREMIA: Status: ACTIVE | Noted: 2025-07-17

## 2025-07-17 PROBLEM — R06.00 ACUTE DYSPNEA: Status: ACTIVE | Noted: 2025-07-17

## 2025-07-17 PROBLEM — J96.11 CHRONIC RESPIRATORY FAILURE WITH HYPOXIA (HCC): Status: ACTIVE | Noted: 2025-07-17

## 2025-07-17 PROBLEM — R78.81 E COLI BACTEREMIA: Status: ACTIVE | Noted: 2025-07-17

## 2025-07-17 PROBLEM — D72.828 NEUTROPHILIA: Status: ACTIVE | Noted: 2025-07-17

## 2025-07-17 LAB
ALBUMIN: 3.1 G/DL (ref 3.4–5)
ANION GAP SERPL CALCULATED.3IONS-SCNC: 15 MMOL/L (ref 3–16)
ANION GAP SERPL CALCULATED.3IONS-SCNC: 15 MMOL/L (ref 3–16)
BASOPHILS # BLD: 0.02 K/UL (ref 0–0.2)
BASOPHILS NFR BLD: 0 %
BUN SERPL-MCNC: 23 MG/DL (ref 7–20)
BUN SERPL-MCNC: 25 MG/DL (ref 7–20)
CALCIUM SERPL-MCNC: 7.5 MG/DL (ref 8.3–10.6)
CALCIUM SERPL-MCNC: 8 MG/DL (ref 8.3–10.6)
CHLORIDE SERPL-SCNC: 90 MMOL/L (ref 99–110)
CHLORIDE SERPL-SCNC: 93 MMOL/L (ref 99–110)
CK SERPL-CCNC: 36 U/L (ref 26–192)
CO2 SERPL-SCNC: 22 MMOL/L (ref 21–32)
CO2 SERPL-SCNC: 23 MMOL/L (ref 21–32)
CREAT SERPL-MCNC: 1.2 MG/DL (ref 0.6–1.2)
CREAT SERPL-MCNC: 1.2 MG/DL (ref 0.6–1.2)
EOSINOPHIL # BLD: 0.17 K/UL (ref 0–0.6)
EOSINOPHILS RELATIVE PERCENT: 2 %
ERYTHROCYTE [DISTWIDTH] IN BLOOD BY AUTOMATED COUNT: 12.9 % (ref 12.4–15.4)
GFR, ESTIMATED: 48 ML/MIN/1.73M2
GFR, ESTIMATED: 49 ML/MIN/1.73M2
GLUCOSE BLD-MCNC: 178 MG/DL (ref 70–99)
GLUCOSE BLD-MCNC: 184 MG/DL (ref 70–99)
GLUCOSE BLD-MCNC: 249 MG/DL (ref 70–99)
GLUCOSE BLD-MCNC: 302 MG/DL (ref 70–99)
GLUCOSE SERPL-MCNC: 225 MG/DL (ref 70–99)
GLUCOSE SERPL-MCNC: 271 MG/DL (ref 70–99)
HCT VFR BLD AUTO: 31.6 % (ref 36–48)
HGB BLD-MCNC: 10.4 G/DL (ref 12–16)
IMM GRANULOCYTES # BLD AUTO: 0.14 K/UL (ref 0–0.5)
IMM GRANULOCYTES NFR BLD: 1 %
LYMPHOCYTES NFR BLD: 0.28 K/UL (ref 1–5.1)
LYMPHOCYTES RELATIVE PERCENT: 3 %
MAGNESIUM SERPL-MCNC: 1.7 MG/DL (ref 1.8–2.4)
MAGNESIUM SERPL-MCNC: 1.8 MG/DL (ref 1.8–2.4)
MCH RBC QN AUTO: 28.8 PG (ref 26–34)
MCHC RBC AUTO-ENTMCNC: 32.9 G/DL (ref 31–36)
MCV RBC AUTO: 87.5 FL (ref 80–100)
MICROORGANISM SPEC CULT: ABNORMAL
MICROORGANISM/AGENT SPEC: ABNORMAL
MICROORGANISM/AGENT SPEC: ABNORMAL
MONOCYTES NFR BLD: 1.06 K/UL (ref 0–1.3)
MONOCYTES NFR BLD: 10 %
NEUTROPHILS NFR BLD: 84 %
NEUTS SEG NFR BLD: 8.72 K/UL (ref 1.7–7.7)
PHOSPHATE SERPL-MCNC: 1.6 MG/DL (ref 2.5–4.9)
PLATELET # BLD AUTO: 249 K/UL (ref 135–450)
PMV BLD AUTO: 10.5 FL
POTASSIUM SERPL-SCNC: 3.4 MMOL/L (ref 3.5–5.1)
POTASSIUM SERPL-SCNC: 3.4 MMOL/L (ref 3.5–5.1)
PROCALCITONIN SERPL-MCNC: 1.2 NG/ML (ref 0–0.15)
RBC # BLD AUTO: 3.61 M/UL (ref 4–5.2)
SERVICE CMNT-IMP: ABNORMAL
SODIUM SERPL-SCNC: 126 MMOL/L (ref 136–145)
SODIUM SERPL-SCNC: 130 MMOL/L (ref 136–145)
SODIUM SERPL-SCNC: 130 MMOL/L (ref 136–145)
SODIUM SERPL-SCNC: 131 MMOL/L (ref 136–145)
SPECIMEN DESCRIPTION: ABNORMAL
SPECIMEN DESCRIPTION: ABNORMAL
TSH SERPL DL<=0.05 MIU/L-ACNC: 1.5 UIU/ML (ref 0.27–4.2)
URATE SERPL-MCNC: 4.4 MG/DL (ref 2.6–6)
WBC OTHER # BLD: 10.4 K/UL (ref 4–11)

## 2025-07-17 PROCEDURE — 2580000003 HC RX 258: Performed by: NURSE PRACTITIONER

## 2025-07-17 PROCEDURE — 2580000003 HC RX 258: Performed by: INTERNAL MEDICINE

## 2025-07-17 PROCEDURE — 2500000003 HC RX 250 WO HCPCS: Performed by: STUDENT IN AN ORGANIZED HEALTH CARE EDUCATION/TRAINING PROGRAM

## 2025-07-17 PROCEDURE — 99291 CRITICAL CARE FIRST HOUR: CPT | Performed by: NURSE PRACTITIONER

## 2025-07-17 PROCEDURE — 94640 AIRWAY INHALATION TREATMENT: CPT

## 2025-07-17 PROCEDURE — 36415 COLL VENOUS BLD VENIPUNCTURE: CPT

## 2025-07-17 PROCEDURE — 85025 COMPLETE CBC W/AUTO DIFF WBC: CPT

## 2025-07-17 PROCEDURE — 84443 ASSAY THYROID STIM HORMONE: CPT

## 2025-07-17 PROCEDURE — 6370000000 HC RX 637 (ALT 250 FOR IP): Performed by: STUDENT IN AN ORGANIZED HEALTH CARE EDUCATION/TRAINING PROGRAM

## 2025-07-17 PROCEDURE — 82962 GLUCOSE BLOOD TEST: CPT

## 2025-07-17 PROCEDURE — 6360000002 HC RX W HCPCS: Performed by: NURSE PRACTITIONER

## 2025-07-17 PROCEDURE — 83930 ASSAY OF BLOOD OSMOLALITY: CPT

## 2025-07-17 PROCEDURE — 99233 SBSQ HOSP IP/OBS HIGH 50: CPT | Performed by: INTERNAL MEDICINE

## 2025-07-17 PROCEDURE — 6370000000 HC RX 637 (ALT 250 FOR IP): Performed by: NURSE PRACTITIONER

## 2025-07-17 PROCEDURE — 87040 BLOOD CULTURE FOR BACTERIA: CPT

## 2025-07-17 PROCEDURE — 2580000003 HC RX 258: Performed by: STUDENT IN AN ORGANIZED HEALTH CARE EDUCATION/TRAINING PROGRAM

## 2025-07-17 PROCEDURE — 6370000000 HC RX 637 (ALT 250 FOR IP): Performed by: INTERNAL MEDICINE

## 2025-07-17 PROCEDURE — 84295 ASSAY OF SERUM SODIUM: CPT

## 2025-07-17 PROCEDURE — 2000000000 HC ICU R&B

## 2025-07-17 PROCEDURE — 84145 PROCALCITONIN (PCT): CPT

## 2025-07-17 PROCEDURE — 6360000002 HC RX W HCPCS: Performed by: INTERNAL MEDICINE

## 2025-07-17 PROCEDURE — 80048 BASIC METABOLIC PNL TOTAL CA: CPT

## 2025-07-17 PROCEDURE — 2500000003 HC RX 250 WO HCPCS: Performed by: NURSE PRACTITIONER

## 2025-07-17 PROCEDURE — 80069 RENAL FUNCTION PANEL: CPT

## 2025-07-17 PROCEDURE — 83735 ASSAY OF MAGNESIUM: CPT

## 2025-07-17 PROCEDURE — 82550 ASSAY OF CK (CPK): CPT

## 2025-07-17 PROCEDURE — 99291 CRITICAL CARE FIRST HOUR: CPT | Performed by: INTERNAL MEDICINE

## 2025-07-17 PROCEDURE — 94761 N-INVAS EAR/PLS OXIMETRY MLT: CPT

## 2025-07-17 PROCEDURE — 84550 ASSAY OF BLOOD/URIC ACID: CPT

## 2025-07-17 PROCEDURE — 2700000000 HC OXYGEN THERAPY PER DAY

## 2025-07-17 PROCEDURE — 6360000002 HC RX W HCPCS: Performed by: STUDENT IN AN ORGANIZED HEALTH CARE EDUCATION/TRAINING PROGRAM

## 2025-07-17 RX ORDER — INSULIN LISPRO 100 [IU]/ML
0-8 INJECTION, SOLUTION INTRAVENOUS; SUBCUTANEOUS
Status: DISCONTINUED | OUTPATIENT
Start: 2025-07-17 | End: 2025-07-17

## 2025-07-17 RX ORDER — 0.9 % SODIUM CHLORIDE 0.9 %
250 INTRAVENOUS SOLUTION INTRAVENOUS ONCE
Status: DISCONTINUED | OUTPATIENT
Start: 2025-07-17 | End: 2025-07-17

## 2025-07-17 RX ORDER — INSULIN LISPRO 100 [IU]/ML
0-8 INJECTION, SOLUTION INTRAVENOUS; SUBCUTANEOUS
Status: DISCONTINUED | OUTPATIENT
Start: 2025-07-17 | End: 2025-07-18

## 2025-07-17 RX ORDER — OXYCODONE HYDROCHLORIDE 5 MG/1
5 TABLET ORAL EVERY 6 HOURS PRN
Refills: 0 | Status: DISCONTINUED | OUTPATIENT
Start: 2025-07-17 | End: 2025-07-19 | Stop reason: HOSPADM

## 2025-07-17 RX ORDER — POTASSIUM CHLORIDE 1500 MG/1
40 TABLET, EXTENDED RELEASE ORAL ONCE
Status: COMPLETED | OUTPATIENT
Start: 2025-07-17 | End: 2025-07-17

## 2025-07-17 RX ORDER — OXYCODONE HYDROCHLORIDE 5 MG/1
2.5 TABLET ORAL EVERY 4 HOURS PRN
Refills: 0 | Status: DISCONTINUED | OUTPATIENT
Start: 2025-07-17 | End: 2025-07-17

## 2025-07-17 RX ORDER — 0.9 % SODIUM CHLORIDE 0.9 %
500 INTRAVENOUS SOLUTION INTRAVENOUS ONCE
Status: COMPLETED | OUTPATIENT
Start: 2025-07-17 | End: 2025-07-17

## 2025-07-17 RX ORDER — INSULIN LISPRO 100 [IU]/ML
0-8 INJECTION, SOLUTION INTRAVENOUS; SUBCUTANEOUS
Status: DISCONTINUED | OUTPATIENT
Start: 2025-07-17 | End: 2025-07-17 | Stop reason: SDUPTHER

## 2025-07-17 RX ORDER — OXYCODONE HYDROCHLORIDE 5 MG/1
5 TABLET ORAL EVERY 4 HOURS PRN
Refills: 0 | Status: DISCONTINUED | OUTPATIENT
Start: 2025-07-17 | End: 2025-07-17

## 2025-07-17 RX ORDER — OXYCODONE HYDROCHLORIDE 5 MG/1
2.5 TABLET ORAL EVERY 6 HOURS PRN
Refills: 0 | Status: DISCONTINUED | OUTPATIENT
Start: 2025-07-17 | End: 2025-07-19 | Stop reason: HOSPADM

## 2025-07-17 RX ORDER — METOPROLOL TARTRATE 25 MG/1
6.25 TABLET, FILM COATED ORAL 2 TIMES DAILY
Status: DISCONTINUED | OUTPATIENT
Start: 2025-07-17 | End: 2025-07-19 | Stop reason: HOSPADM

## 2025-07-17 RX ADMIN — PANTOPRAZOLE SODIUM 40 MG: 40 TABLET, DELAYED RELEASE ORAL at 06:21

## 2025-07-17 RX ADMIN — CEFTRIAXONE SODIUM 2000 MG: 2 INJECTION, POWDER, FOR SOLUTION INTRAMUSCULAR; INTRAVENOUS at 10:54

## 2025-07-17 RX ADMIN — ROPINIROLE HYDROCHLORIDE 0.25 MG: 0.25 TABLET, FILM COATED ORAL at 20:14

## 2025-07-17 RX ADMIN — LEVETIRACETAM 500 MG: 500 TABLET, FILM COATED ORAL at 20:14

## 2025-07-17 RX ADMIN — GABAPENTIN 300 MG: 300 CAPSULE ORAL at 20:14

## 2025-07-17 RX ADMIN — IPRATROPIUM BROMIDE AND ALBUTEROL SULFATE 1 DOSE: 2.5; .5 SOLUTION RESPIRATORY (INHALATION) at 12:26

## 2025-07-17 RX ADMIN — IPRATROPIUM BROMIDE AND ALBUTEROL SULFATE 1 DOSE: 2.5; .5 SOLUTION RESPIRATORY (INHALATION) at 07:39

## 2025-07-17 RX ADMIN — IPRATROPIUM BROMIDE AND ALBUTEROL SULFATE 1 DOSE: 2.5; .5 SOLUTION RESPIRATORY (INHALATION) at 16:03

## 2025-07-17 RX ADMIN — SODIUM CHLORIDE, PRESERVATIVE FREE 10 ML: 5 INJECTION INTRAVENOUS at 20:14

## 2025-07-17 RX ADMIN — ARFORMOTEROL TARTRATE: 15 SOLUTION RESPIRATORY (INHALATION) at 19:58

## 2025-07-17 RX ADMIN — CLONAZEPAM 1 MG: 1 TABLET ORAL at 17:47

## 2025-07-17 RX ADMIN — POTASSIUM CHLORIDE 40 MEQ: 1500 TABLET, EXTENDED RELEASE ORAL at 17:47

## 2025-07-17 RX ADMIN — ARFORMOTEROL TARTRATE: 15 SOLUTION RESPIRATORY (INHALATION) at 07:39

## 2025-07-17 RX ADMIN — METOPROLOL TARTRATE 12.5 MG: 25 TABLET, FILM COATED ORAL at 08:42

## 2025-07-17 RX ADMIN — ASPIRIN 81 MG: 81 TABLET, COATED ORAL at 08:42

## 2025-07-17 RX ADMIN — DONEPEZIL HYDROCHLORIDE 5 MG: 5 TABLET, FILM COATED ORAL at 20:14

## 2025-07-17 RX ADMIN — GABAPENTIN 300 MG: 300 CAPSULE ORAL at 08:42

## 2025-07-17 RX ADMIN — FERROUS SULFATE TAB 325 MG (65 MG ELEMENTAL FE) 325 MG: 325 (65 FE) TAB at 08:42

## 2025-07-17 RX ADMIN — LEVETIRACETAM 500 MG: 500 TABLET, FILM COATED ORAL at 08:42

## 2025-07-17 RX ADMIN — METOPROLOL TARTRATE 6.25 MG: 25 TABLET, FILM COATED ORAL at 20:14

## 2025-07-17 RX ADMIN — INSULIN LISPRO 4 UNITS: 100 INJECTION, SOLUTION INTRAVENOUS; SUBCUTANEOUS at 20:14

## 2025-07-17 RX ADMIN — CEFTRIAXONE SODIUM 2000 MG: 2 INJECTION, POWDER, FOR SOLUTION INTRAMUSCULAR; INTRAVENOUS at 21:58

## 2025-07-17 RX ADMIN — Medication 10 ML: at 20:15

## 2025-07-17 RX ADMIN — ENOXAPARIN SODIUM 30 MG: 100 INJECTION SUBCUTANEOUS at 17:48

## 2025-07-17 RX ADMIN — TIOTROPIUM BROMIDE INHALATION SPRAY 5 MCG: 3.12 SPRAY, METERED RESPIRATORY (INHALATION) at 07:39

## 2025-07-17 RX ADMIN — OXYCODONE 5 MG: 5 TABLET ORAL at 08:42

## 2025-07-17 RX ADMIN — INSULIN LISPRO 3 UNITS: 100 INJECTION, SOLUTION INTRAVENOUS; SUBCUTANEOUS at 12:14

## 2025-07-17 RX ADMIN — ROSUVASTATIN CALCIUM 10 MG: 10 TABLET, FILM COATED ORAL at 17:47

## 2025-07-17 RX ADMIN — CEFEPIME 1000 MG: 1 INJECTION, POWDER, FOR SOLUTION INTRAMUSCULAR; INTRAVENOUS at 02:46

## 2025-07-17 RX ADMIN — Medication 10 ML: at 08:46

## 2025-07-17 RX ADMIN — INSULIN LISPRO 2 UNITS: 100 INJECTION, SOLUTION INTRAVENOUS; SUBCUTANEOUS at 17:51

## 2025-07-17 RX ADMIN — Medication 1 TABLET: at 08:42

## 2025-07-17 RX ADMIN — SODIUM CHLORIDE, PRESERVATIVE FREE 10 ML: 5 INJECTION INTRAVENOUS at 08:45

## 2025-07-17 RX ADMIN — ROPINIROLE HYDROCHLORIDE 0.25 MG: 0.25 TABLET, FILM COATED ORAL at 08:42

## 2025-07-17 RX ADMIN — OXYCODONE 5 MG: 5 TABLET ORAL at 21:00

## 2025-07-17 RX ADMIN — SODIUM CHLORIDE 500 ML: 0.9 INJECTION, SOLUTION INTRAVENOUS at 12:07

## 2025-07-17 ASSESSMENT — PAIN SCALES - GENERAL
PAINLEVEL_OUTOF10: 0
PAINLEVEL_OUTOF10: 0
PAINLEVEL_OUTOF10: 7
PAINLEVEL_OUTOF10: 7
PAINLEVEL_OUTOF10: 0
PAINLEVEL_OUTOF10: 0

## 2025-07-17 ASSESSMENT — PAIN DESCRIPTION - ORIENTATION: ORIENTATION: LEFT;LOWER

## 2025-07-17 ASSESSMENT — ENCOUNTER SYMPTOMS
COUGH: 0
EYE REDNESS: 0
BLOOD IN STOOL: 0
DIARRHEA: 0
PHOTOPHOBIA: 0
FACIAL SWELLING: 0
EYE DISCHARGE: 0
SHORTNESS OF BREATH: 0
ABDOMINAL DISTENTION: 0
VOMITING: 0
SINUS PAIN: 0
ABDOMINAL PAIN: 0
NAUSEA: 0
CHEST TIGHTNESS: 0
CONSTIPATION: 0

## 2025-07-17 ASSESSMENT — PAIN DESCRIPTION - ONSET: ONSET: ON-GOING

## 2025-07-17 ASSESSMENT — PAIN DESCRIPTION - PAIN TYPE: TYPE: ACUTE PAIN

## 2025-07-17 ASSESSMENT — PAIN DESCRIPTION - LOCATION
LOCATION: FLANK
LOCATION: GENERALIZED;OTHER (COMMENT)

## 2025-07-17 ASSESSMENT — PAIN SCALES - WONG BAKER: WONGBAKER_NUMERICALRESPONSE: NO HURT

## 2025-07-17 ASSESSMENT — PAIN DESCRIPTION - DESCRIPTORS
DESCRIPTORS: BURNING;DISCOMFORT
DESCRIPTORS: DISCOMFORT

## 2025-07-17 ASSESSMENT — PAIN DESCRIPTION - FREQUENCY: FREQUENCY: CONTINUOUS

## 2025-07-17 NOTE — PROGRESS NOTES
[facility] Note    Patient Active Problem List   Diagnosis    Sepsis (HCC)    Hyponatremia    Acute encephalopathy    Headache    Pericardial effusion    Shortness of breath    Chest pain    Atrial fibrillation with rapid ventricular response (HCC)       Past Medical History:   has a past medical history of A-fib (HCC), COPD (chronic obstructive pulmonary disease) (HCC), Diabetes mellitus (HCC), H/O heart artery stent, Hypertension, and Presence of Watchman left atrial appendage closure device.    Past Social History:   reports that she has quit smoking. Her smoking use included cigarettes. She started smoking about 25 years ago. She has a 25.5 pack-year smoking history. She has never used smokeless tobacco. She reports current alcohol use. She reports that she does not use drugs.    Subjective:    Planning to transfer to ICU for hypotension  No other complaints  Review of Systems   Constitutional:  Positive for fatigue. Negative for activity change, appetite change, chills, fever and unexpected weight change.   HENT:  Negative for congestion and facial swelling.    Eyes:  Negative for photophobia, discharge and redness.   Respiratory:  Negative for cough, chest tightness and shortness of breath.    Cardiovascular:  Negative for chest pain, palpitations and leg swelling.   Gastrointestinal:  Negative for abdominal distention, abdominal pain, blood in stool, constipation, diarrhea, nausea and vomiting.   Endocrine: Negative for cold intolerance, heat intolerance and polyuria.   Genitourinary:  Negative for decreased urine volume, difficulty urinating, flank pain and hematuria.   Musculoskeletal:  Negative for joint swelling and neck pain.   Neurological:  Negative for dizziness, seizures, syncope, speech difficulty, light-headedness and headaches.   Hematological:  Does not bruise/bleed easily.   Psychiatric/Behavioral:  Negative for agitation, confusion and hallucinations.        Objective:

## 2025-07-17 NOTE — PROGRESS NOTES
CARDIOLOGY PROGRESS NOTE        Patient Name: Viji Gamez  Date of admission: 7/15/2025 10:33 AM  Admission Dx: Hyponatremia [E87.1]  Chest pain [R07.9]  Elevated troponin [R79.89]  Atrial fibrillation with rapid ventricular response (HCC) [I48.91]  Acute dyspnea [R06.00]  Pneumonia of left lower lobe due to infectious organism [J18.9]  Chest pain, unspecified type [R07.9]  Sepsis (HCC) [A41.9]  Requesting Physician: Ca Reynolds MD  Primary Care physician: Azam De Luna MD    Reason for Consultation/Chief Complaint: Afib RVR    History of Present Illness:     Viji Gamez is a 75 y.o. patient with Pmh of COPD on chronic O2, CAD s/p PCI, Afib with Watchman, CVA, chronic diastolic heart failure, h/o pericardial effusion . Patient presented to the hospital from her pulmonologist office for elevated HR and hypotension.     Patient seen in room, she is feeling tired today. No chest pain or overnight events.       Past Medical History:   has a past medical history of A-fib (HCC), COPD (chronic obstructive pulmonary disease) (HCC), Diabetes mellitus (HCC), H/O heart artery stent, Hypertension, and Presence of Watchman left atrial appendage closure device.    Surgical History:   has a past surgical history that includes Cataract extraction and back surgery.     Social History:   reports that she has quit smoking. Her smoking use included cigarettes. She started smoking about 25 years ago. She has a 25.5 pack-year smoking history. She has never used smokeless tobacco. She reports current alcohol use. She reports that she does not use drugs.     Family History:  family history is not on file.      Home Medications:  Were reviewed and are listed in nursing record and/or below  Prior to Admission medications    Medication Sig Start Date End Date Taking? Authorizing Provider   VITAMIN D PO Take by mouth   Yes Provider, MD Sunny   DULoxetine (CYMBALTA) 30 MG extended release capsule Take 1  Normal left ventricular systolic function with a visually estimated EF of 65 - 70%. EF by 2D Simpsons Biplane is 66%. Left ventricle size is normal. Normal wall thickness. Normal wall motion.    Right Ventricle: Right ventricle is mildly dilated. Moderately reduced systolic function. TAPSE is abnormal. TDI systolic excursion is abnormal.    Tricuspid Valve: Mild to moderate regurgitation.    Left Atrium: Left atrium is severely dilated. Left atrial volume index is severely increased (>48 mL/m2) mL/m2.    Right Atrium: Right atrium is moderately dilated.    Pericardium: Trivial pericardial effusion present. Pericardial effusion contains echogenic structures. No indication of cardiac tamponade.    Image quality is fair. Contrast used: Lumason.    Stress 4/2024  Perfusion:   There is a medium sized area of mildly to moderately reduced perfusion at   rest with partial improvement at stress in the basal and mid anterior,   anterolateral, and apical anterior segments consistent with attenuation   artifact versus scar. Favor attenuation artifact      No evidence of ischemia    SSS: 2    SRS: 12    TID: 1.06. No visual TID    Wall motion: normal    RV: not visualized    Overall   No evidence of ischemia   Attenuation artifact in the anterior wall     CT chest pulm 7/7/25  IMPRESSION:  1.  No pulmonary embolism.  2.  Mild cardiomegaly with trace pericardial effusion.  3.  Coronary artery calcified lesions are present.    Impression and Plan:      1.  Atrial fibrillation  - Rate much improved  -S/p Watchman device  -Echo with normal LV systolic function.    Plan: Continue IV Cardizem infusion and then add oral rate controlling medications.  Some question as to compliance    2.  CAD/coronary artery calcification seen on CT scan  -Normal stress test a little over 1 year ago    Plan: Medical management    3.  Elevated troponin   -Not in a pattern consistent with ACS    Plan: Medical management    4.  Chest discomfort  -May be

## 2025-07-17 NOTE — CONSULTS
Urology Consult Note      Reason for Consultation: Urinary retention    History: 75-year-old female with history of peripheral neuropathy, anxiety, depression, hypertension, dementia presents with chest pain, shortness of breath and dizziness.  She was found of A-fib with RVR and sepsis likely due to UTI.  She had a CT abdomen/pelvis that showed a distended bladder with striated nephrogram on the left kidney favored to be acute pyelonephritis or related to bladder outlet obstruction.  She had a Long placed.  Patient denies any previous complaints or urological issues.    Family History, Social History, Review of Systems:  Reviewed and agreed to as per chart    Vitals:  BP (!) 101/59   Pulse 95   Temp 98.2 °F (36.8 °C) (Oral)   Resp 17   Ht 1.524 m (5')   Wt 54.2 kg (119 lb 6.4 oz)   SpO2 95%   BMI 23.32 kg/m²   Temp  Av.5 °F (37.5 °C)  Min: 98.2 °F (36.8 °C)  Max: 102 °F (38.9 °C)    Intake/Output Summary (Last 24 hours) at 2025 0730  Last data filed at 2025 0709  Gross per 24 hour   Intake 1330.67 ml   Output 4275 ml   Net -2944.33 ml         Physical:  Well developed, well nourished in no acute distress  Mood indicates no abnormalities. Pt doesn’t appear depressed  Orientated to time and place  Abdomen soft and nontender  Long draining clear urine      Labs:  WBC:    Lab Results   Component Value Date/Time    WBC 19.7 2025 04:49 AM     Hemoglobin/Hematocrit:    Lab Results   Component Value Date/Time    HGB 11.4 2025 04:49 AM    HCT 35.2 2025 04:49 AM     BMP:    Lab Results   Component Value Date/Time     2025 04:46 AM    K 4.3 2025 04:49 AM    CL 90 2025 04:49 AM    CO2 18 2025 04:49 AM    BUN 29 2025 04:49 AM    CREATININE 1.3 2025 04:49 AM    CALCIUM 8.5 2025 04:49 AM    LABGLOM 42 2025 04:49 AM    LABGLOM >90 2024 01:49 PM     PT/INR:    Lab Results   Component Value Date/Time    PROTIME 12.1 2025

## 2025-07-17 NOTE — CONSULTS
Pulmonary Consult Note      Reason for Consult: Hypotension/sepsis  Requesting Physician: Adama Reynolds      Subjective:     CHIEF COMPLAINT / HPI:      The patient is a 75 y.o. female with h/o AFIB s/p watchman, COPD, DM, HTN who presented from my pulmonology clinic with hypotension, tachyarrythmia, lightheadedness and dizziness.  She was admitted to PCU and found to septic with blood cultures and urine cultures positive for E.coli.  7/17/25 Ms Gamez is complaining of lightheadedness, headache and found to be hypotensive. She is being transferred to the ICU for further management and closer monitoring.        Past Medical History:      Diagnosis Date    A-fib (HCC)     COPD (chronic obstructive pulmonary disease) (HCC)     Diabetes mellitus (HCC)     H/O heart artery stent     Hypertension     Presence of Watchman left atrial appendage closure device       Past Surgical History:        Procedure Laterality Date    BACK SURGERY      CATARACT EXTRACTION       Current Medications:     cefTRIAXone (ROCEPHIN) IV  2,000 mg IntraVENous Q12H    sodium chloride flush  5-40 mL IntraVENous 2 times per day    lidocaine 1 % injection  50 mg IntraDERmal Once    [Held by provider] metoprolol tartrate  12.5 mg Oral BID    gabapentin  300 mg Oral BID    rosuvastatin  10 mg Oral QPM    enoxaparin  30 mg SubCUTAneous Daily    sodium chloride flush  5-40 mL IntraVENous 2 times per day    ipratropium 0.5 mg-albuterol 2.5 mg  1 Dose Inhalation 4x Daily RT    aspirin  81 mg Oral Daily    donepezil  5 mg Oral Nightly    ferrous sulfate  325 mg Oral Daily with breakfast    levETIRAcetam  500 mg Oral BID    arformoterol 15 mcg-budesonide 0.5 mg neb solution   Nebulization BID RT    therapeutic multivitamin-minerals  1 tablet Oral Daily    insulin lispro  0-4 Units SubCUTAneous 4x Daily AC & HS    pantoprazole  40 mg Oral QAM AC    rOPINIRole  0.25 mg Oral BID    tiotropium  2 puff Inhalation Daily RT     Allergies:    Allergies   Allergen  estimated EF of 65 - 70%. EF by 2D Simpsons Biplane is 66%. Left ventricle size is normal. Normal wall thickness. Normal wall motion.    Right Ventricle: Right ventricle is mildly dilated. Moderately reduced systolic function. TAPSE is abnormal. TDI systolic excursion is abnormal.    Tricuspid Valve: Mild to moderate regurgitation.    Left Atrium: Left atrium is severely dilated. Left atrial volume index is severely increased (>48 mL/m2) mL/m2.    Right Atrium: Right atrium is moderately dilated.    Pericardium: Trivial pericardial effusion present. Pericardial effusion contains echogenic structures. No indication of cardiac tamponade.    Image quality is fair. Contrast used: Lumason.    Signed by: Jasvir Zapata MD on 7/7/2025 10:07 AM     Assessment/Plan   COPD, no PFTs on file  3L NC O2 at baseline  Home meds include:  Spiriva and Dulera  Admitted 7/15/25 from clinic - hypotensive/light headedness   - continue home inhalers  - Wean O2 for SATS>90%  - Aggressive pulmonary hygiene: I/S, EZ PAP, OOBTC, Ambulate  - Outpatient appointment made in in chart    AFIB s/p watchman  CAD  Diastolic Heart failure  HL  Hx CVA  Home med: amlodipine - do not continue  Initially on cardizem infusion and now off  Cardiology was consulted: has signed off for now  - metoprolol PO 6.25mg BID with hold parameters   - initially required lasix: currently not ordered:  Accurate I&O goal euvolemia  - continue ASA and statin      UTI, E.Coli  Bacteremia, E.Coli  Sepsis  Hypotension- improved  BP 80/60 on the floor.  Fluid given per primary.    WBC 10.4 from 19.7  TMAX 100.2  CT abdomen with concern for pyelonephritis  Blood Culture: E.Coli  Urine Culture: E. Coli  Antibiotics:  Ceftriaxone 2 grams q24h IV    Acute Kidney Injury  Hypokalemia  Hyponatremia  Baseline creatinine 0.6 currently 1.2 with peak 1.3  ?postobstructive now s/p walters catheter  Nephrology following: appreciate input  - accurate I&O; continue walters catheter  - avoid

## 2025-07-17 NOTE — CONSULTS
Infectious Diseases Inpatient Consult Note      Reason for Consult: Severe  Sepsis, E. coli bacteremia, complicated  urinary tract infection    Requesting Physician:       Primary Care Physician:  Azam De Luna MD    History Obtained From:  Epic and patient     CHIEF COMPLAINT:     Chief Complaint   Patient presents with    Shortness of Breath   E. coli bacteremia    HISTORY OF PRESENT ILLNESS:  75 y.o. female with significant history for diabetes, neuropathy, anxiety, depression, dementia, hypertension, admitted to hospital today due to shortness of breath dizziness found to be in atrial fibrillation and severe sepsis with WBC elevation Tmax 102 urine culture positive for E. coli blood cultures from admission positive for E. coli in 4 out of 4 bottles.  CT abdomen pelvis indicating distended bladder with outlet obstruction associated with a left nephritis Long catheter was placed urology consulted.  Due to ongoing fever sepsis bacteremia we are consulted for recommendations. She is now tx to ICU for dizziness and hypotension was seen by cardiology earlier for A fib.         Past Medical History:    Past Medical History:   Diagnosis Date    A-fib (HCC)     COPD (chronic obstructive pulmonary disease) (HCC)     Diabetes mellitus (HCC)     H/O heart artery stent     Hypertension     Presence of Watchman left atrial appendage closure device        Past Surgical History:    Past Surgical History:   Procedure Laterality Date    BACK SURGERY      CATARACT EXTRACTION         Current Medications:    Outpatient Medications Marked as Taking for the 7/15/25 encounter (Hospital Encounter)   Medication Sig Dispense Refill    VITAMIN D PO Take by mouth      DULoxetine (CYMBALTA) 30 MG extended release capsule Take 1 capsule by mouth 2 times daily      desvenlafaxine succinate (PRISTIQ) 25 MG TB24 extended release tablet Take 1 tablet by mouth daily      amLODIPine (NORVASC) 5 MG tablet Take 1 tablet by mouth  polyethylene glycol, acetaminophen **OR** acetaminophen, glucose, dextrose bolus **OR** dextrose bolus, glucagon (rDNA), dextrose, ipratropium 0.5 mg-albuterol 2.5 mg, oxyCODONE **OR** oxyCODONE    Imaging:   CT ABDOMEN PELVIS WO CONTRAST Additional Contrast? None   Final Result   Impression:       Bladder outlet obstruction. The urinary bladder is distended with contrast from yesterday's CT. Decompression needed.      Striated nephrogram in the left kidney. This is favored to be either secondary to acute pyelonephritis or sequelae of the bladder outlet obstruction. Differential diagnosis includes vascular causes such as renal vein thrombosis or renal artery    thrombosis.      Cholelithiasis with luminal distention of the gallbladder. No evidence of acute cholecystitis.      Small pericardial effusion.      Electronically signed by Brian Parker MD      CT CHEST PULMONARY EMBOLISM W CONTRAST   Final Result   1.  No pulmonary embolism.   2.  Mild cardiomegaly with trace pericardial effusion.   3.  Coronary artery calcified lesions are present.      ----------PERT DATA----------      Data reported only if pulmonary embolism is present:      Most central extent of clot:   NA      RV/LV ratio:   NA      ----------PERT DATA----------      Electronically signed by Ricardo Rutherford MD      XR CHEST (2 VW)   Final Result   Moderately enlarged cardiopericardial silhouette, which likely reflects a combination of cardiomegaly and pericardial effusion, as seen on recent CT.      Mild left basilar atelectasis.      Electronically signed by Jaja Cordova DO          All pertinent images and reports for the current Hospitalization were reviewed by me.    IMPRESSION:    Patient Active Problem List   Diagnosis Code    Sepsis (HCC) A41.9    Hyponatremia E87.1    Acute encephalopathy G93.40    Headache R51.9    Pericardial effusion I31.39    Shortness of breath R06.02    Chest pain R07.9    Atrial fibrillation with rapid ventricular  response ([facility]) I48.91        ICD-10-CM    1. Atrial fibrillation with rapid ventricular response ([facility])  I48.91       2. Chest pain, unspecified type  R07.9       3. Acute dyspnea  R06.00       4. Elevated troponin  R79.89       5. Pneumonia of left lower lobe due to infectious organism  J18.9       6. Hyponatremia  E87.1          Severe sepsis now with hypotension progressing to septic shock   High fever  E. coli bacteremia  Complicated urinary tract  infection  Urinary retention  Atrial fibrillation with rapid ventricular response  CT abdomen pelvis showing distended bladder with left pyelonephritis and concern for bladder outlet obstruction  Long catheter placement  Urine culture positive for E. coli  ARMIN creatinine 1.3  Hemoglobin A1c 6.5  WBC elevation  CT chest no pulmonary embolism mild cardiomegaly  COPD on home oxygen   H/o seizures on Keppra    She remains critically  ill in the hospital secondary to ongoing complicated urinary tract infection with E. coli bacteremia and sepsis.  Source appears to be genitourinary tract infection with E. coli on urine culture.  E. coli sensitivities reviewed now in ICU with  A fib, Hypotension .    Labs, Microbiology, Radiology and pertinent results from current hospitalization and care every where were reviewed by me as a part of the consultation.    PLAN :  DC cefepime  Changed to IV ceftriaxone 2 g every 12 hr  Repeat blood cultures  Trend WBC  Check procalcitonin  Urine culture noted  Long catheter to decompress the bladder  Urology notes reviewed  Avoid quinolones due to atrial fibrillation and rate limiting medication  E. coli appears to be sensitive  Will be able to choose oral antibiotic for discharge planning  Now in ICU for close monitoring     Discussed with patient/Family and Nursing  d/w Pharmacy    Medical Decision Making:  The following items were considered in medical decision making:  Discussion of patient care with other providers  Reviewed clinical lab

## 2025-07-17 NOTE — PLAN OF CARE
Problem: Chronic Conditions and Co-morbidities  Goal: Patient's chronic conditions and co-morbidity symptoms are monitored and maintained or improved  7/16/2025 2152 by Latoya Estrada RN  Outcome: Progressing  Flowsheets (Taken 7/16/2025 2152)  Care Plan - Patient's Chronic Conditions and Co-Morbidity Symptoms are Monitored and Maintained or Improved:   Monitor and assess patient's chronic conditions and comorbid symptoms for stability, deterioration, or improvement   Collaborate with multidisciplinary team to address chronic and comorbid conditions and prevent exacerbation or deterioration   Update acute care plan with appropriate goals if chronic or comorbid symptoms are exacerbated and prevent overall improvement and discharge  7/16/2025 1510 by Anny Mcqueen RN  Outcome: Progressing     Problem: Discharge Planning  Goal: Discharge to home or other facility with appropriate resources  7/16/2025 2152 by Latoya Estrada RN  Outcome: Progressing  Flowsheets (Taken 7/16/2025 2152)  Discharge to home or other facility with appropriate resources:   Identify barriers to discharge with patient and caregiver   Identify discharge learning needs (meds, wound care, etc)   Arrange for needed discharge resources and transportation as appropriate  7/16/2025 1510 by Anny Mcqueen RN  Outcome: Progressing     Problem: Pain  Goal: Verbalizes/displays adequate comfort level or baseline comfort level  7/16/2025 2152 by Latoya Estrada RN  Outcome: Progressing  Flowsheets (Taken 7/16/2025 2152)  Verbalizes/displays adequate comfort level or baseline comfort level:   Encourage patient to monitor pain and request assistance   Administer analgesics based on type and severity of pain and evaluate response   Consider cultural and social influences on pain and pain management   Assess pain using appropriate pain scale   Implement non-pharmacological measures as appropriate and evaluate response   Notify Licensed Independent

## 2025-07-17 NOTE — PROGRESS NOTES
Hospitalist Progress Note      PCP: Azam De Luna MD    Date of Admission: 7/15/2025    Chief Complaint: Chest pain, shortness of breath, dizziness    Hospital Course: Viji Gamez is a 75 y.o. female with pmh of peripheral neuropathy, anxiety, hypertension, RLS, hyperlipidemia, history of hip fracture, epilepsy on Keppra, COPD, chronic hypoxic respiratory failure on 3 L baseline, atrial fibrillation with watchman in place, DM, CAD status post PCI, CVA, dementia  who presents with chest pain, shortness of breath, and dizziness.  Patient was recently discharged from hospital on 7/9/2025 with pericardial effusion, acute on chronic respiratory failure.  She states she was feeling pretty well at home.  This morning when she woke up she was very dizzy and had episode of diarrhea.  She proceeded to go to her pulmonology appointment and while there she complained of dizziness and systolic blood pressure was found to be 80 and her heart rate was irregular.  Therefore pulmonology NP referred her to the ED for further evaluation.  Patient was found to have UTI and developed sepsis in hospital.  Cardiology was consulted for A-fib and she was placed on Cardizem drip.  See further details below.        Subjective: Patient laying in bed, reports she is feeling better this afternoon compared to this morning.  This morning her blood pressure was low and she felt dizzy.  Dizziness is now resolved.  Now she feels very tired.  Denies chest pain or shortness of breath at this time.  Reviewed plan of care, denies further needs or questions.    Pt was moved to ICU for closer bp monitoring.       Assessment/Plan:    Chest pain  A-fib RVR, status post Watchman device  - Troponin mildly elevated and trending flat  - Placed on telemetry  - Consult cardiology, placed on Cardizem drip.  DC Norvasc and started metoprolol 12.5 twice daily.   - Patient is not on any rate controlling drugs nor anticoagulation at home  - Off Cardizem  reflects a combination of cardiomegaly and pericardial effusion, as seen on recent CT.      Mild left basilar atelectasis.      Electronically signed by Jaja Cordova DO              DVT Prophylaxis: Lovenox  Diet: ADULT DIET; Regular  Code Status: Full Code    PT/OT Eval Status: Consult PT OT after heart rate controlled    Dispo -likely home once medically stable in 2 to 3 days    Niki Torres, APRN - CNP      NOTE:  This report was transcribed using voice recognition software.  Every effort was made to ensure accuracy; however, inadvertent computerized transcription errors may be present.

## 2025-07-17 NOTE — PROGRESS NOTES
4 Eyes Skin Assessment     NAME:  Viji Gamez  YOB: 1950  MEDICAL RECORD NUMBER:  [id number]    The patient is being assessed for  Transfer to New Unit    I agree that at least one RN has performed a thorough Head to Toe Skin Assessment on the patient. ALL assessment sites listed below have been assessed.      Areas assessed by both nurses:    Head, Face, Ears, Shoulders, Back, Chest, Arms, Elbows, Hands, Sacrum. Buttock, Coccyx, Ischium, Legs. Feet and Heels, and Under Medical Devices         Does the Patient have a Wound? No noted wound(s)       Reza Prevention initiated by RN: No  Wound Care Orders initiated by RN: No    For hospital-acquired stage 1 & 2 and ALL Stage 3,4, Unstageable, DTI, NWPT, and Complex wounds: place order “IP Wound Care/Ostomy Nurse Eval and Treat” by RN under : No    New Ostomies, if present place, Ostomy referral order under : No     Nurse 1 eSignature: Electronically signed by Neli Kaminski RN on 7/17/25 at 6:39 PM EDT    **SHARE this note so that the co-signing nurse can place an eSignature**    Nurse 2 eSignature: Electronically signed by MADHU HUITRON RN on 7/17/25 at 6:40 PM EDT

## 2025-07-17 NOTE — PROGRESS NOTES
Patient to transfer to ICU for close hemodynamic monitoring. Report given to Neli DORADO. This RN notified patient's family member She.

## 2025-07-17 NOTE — PROGRESS NOTES
End of Shift Summary:     Significant Events:   - transferred from PCU to ICU, low BP and dizziness      Neuro: A&O x 4, some confusion     Cardiac: AFIB, low BP's      Respiratory: 3 liters - baseline     GI: romeo    : Reg diet, ACHS     IV Access/Drips: 22 RAC, RFA, Midline

## 2025-07-18 LAB
ACB COMPLEX DNA BLD POS QL NAA+NON-PROBE: NOT DETECTED
ALBUMIN: 3.3 G/DL (ref 3.4–5)
ANION GAP SERPL CALCULATED.3IONS-SCNC: 16 MMOL/L (ref 3–16)
B FRAGILIS DNA BLD POS QL NAA+NON-PROBE: NOT DETECTED
BASOPHILS # BLD: 0.04 K/UL (ref 0–0.2)
BASOPHILS NFR BLD: 0 %
BLACTX-M ISLT/SPM QL: NOT DETECTED
BLAIMP ISLT/SPM QL: NOT DETECTED
BLAKPC ISLT/SPM QL: NOT DETECTED
BLAOXA-48-LIKE ISLT/SPM QL: NOT DETECTED
BLAVIM ISLT/SPM QL: NOT DETECTED
BUN SERPL-MCNC: 20 MG/DL (ref 7–20)
C ALBICANS DNA BLD POS QL NAA+NON-PROBE: NOT DETECTED
C AURIS DNA BLD POS QL NAA+NON-PROBE: NOT DETECTED
C GATTII+NEOFOR DNA BLD POS QL NAA+N-PRB: NOT DETECTED
C GLABRATA DNA BLD POS QL NAA+NON-PROBE: NOT DETECTED
C KRUSEI DNA BLD POS QL NAA+NON-PROBE: NOT DETECTED
C PARAP DNA BLD POS QL NAA+NON-PROBE: NOT DETECTED
C TROPICLS DNA BLD POS QL NAA+NON-PROBE: NOT DETECTED
CALCIUM SERPL-MCNC: 8.3 MG/DL (ref 8.3–10.6)
CHLORIDE SERPL-SCNC: 95 MMOL/L (ref 99–110)
CO2 SERPL-SCNC: 22 MMOL/L (ref 21–32)
COLISTIN RES MCR-1 ISLT/SPM QL: NOT DETECTED
CREAT SERPL-MCNC: 1.1 MG/DL (ref 0.6–1.2)
E CLOAC COMP DNA BLD POS NAA+NON-PROBE: NOT DETECTED
E COLI DNA BLD POS QL NAA+NON-PROBE: DETECTED
E FAECALIS DNA BLD POS QL NAA+NON-PROBE: NOT DETECTED
E FAECIUM DNA BLD POS QL NAA+NON-PROBE: NOT DETECTED
ENTEROBACTERALES DNA BLD POS NAA+N-PRB: DETECTED
EOSINOPHIL # BLD: 0.31 K/UL (ref 0–0.6)
EOSINOPHILS RELATIVE PERCENT: 3 %
ERYTHROCYTE [DISTWIDTH] IN BLOOD BY AUTOMATED COUNT: 13 % (ref 12.4–15.4)
GFR, ESTIMATED: 55 ML/MIN/1.73M2
GLUCOSE BLD-MCNC: 162 MG/DL (ref 70–99)
GLUCOSE BLD-MCNC: 179 MG/DL (ref 70–99)
GLUCOSE BLD-MCNC: 260 MG/DL (ref 70–99)
GLUCOSE BLD-MCNC: 325 MG/DL (ref 70–99)
GLUCOSE SERPL-MCNC: 173 MG/DL (ref 70–99)
GP B STREP DNA BLD POS QL NAA+NON-PROBE: NOT DETECTED
HAEM INFLU DNA BLD POS QL NAA+NON-PROBE: NOT DETECTED
HCT VFR BLD AUTO: 32.7 % (ref 36–48)
HGB BLD-MCNC: 10.4 G/DL (ref 12–16)
IMM GRANULOCYTES # BLD AUTO: 0.23 K/UL (ref 0–0.5)
IMM GRANULOCYTES NFR BLD: 2 %
K OXYTOCA DNA BLD POS QL NAA+NON-PROBE: NOT DETECTED
KLEBSIELLA SP DNA BLD POS QL NAA+NON-PRB: NOT DETECTED
KLEBSIELLA SP DNA BLD POS QL NAA+NON-PRB: NOT DETECTED
L MONOCYTOG DNA BLD POS QL NAA+NON-PROBE: NOT DETECTED
LYMPHOCYTES NFR BLD: 0.64 K/UL (ref 1–5.1)
LYMPHOCYTES RELATIVE PERCENT: 6 %
MAGNESIUM SERPL-MCNC: 1.7 MG/DL (ref 1.8–2.4)
MCH RBC QN AUTO: 28.1 PG (ref 26–34)
MCHC RBC AUTO-ENTMCNC: 31.8 G/DL (ref 31–36)
MCV RBC AUTO: 88.4 FL (ref 80–100)
MICROORGANISM SPEC CULT: ABNORMAL
MICROORGANISM SPEC CULT: ABNORMAL
MICROORGANISM/AGENT SPEC: ABNORMAL
MICROORGANISM/AGENT SPEC: ABNORMAL
MONOCYTES NFR BLD: 1.49 K/UL (ref 0–1.3)
MONOCYTES NFR BLD: 13 %
N MEN DNA BLD POS QL NAA+NON-PROBE: NOT DETECTED
NEUTROPHILS NFR BLD: 76 %
NEUTS SEG NFR BLD: 8.56 K/UL (ref 1.7–7.7)
OSMOLALITY SERPL: 286 MOSM/KG (ref 280–301)
P AERUGINOSA DNA BLD POS NAA+NON-PROBE: NOT DETECTED
PHOSPHATE SERPL-MCNC: 2.2 MG/DL (ref 2.5–4.9)
PLATELET # BLD AUTO: 249 K/UL (ref 135–450)
PMV BLD AUTO: 10.1 FL
POTASSIUM SERPL-SCNC: 3.3 MMOL/L (ref 3.5–5.1)
POTASSIUM, UR: 9.9 MMOL/L
PROTEUS SP DNA BLD POS QL NAA+NON-PROBE: NOT DETECTED
RBC # BLD AUTO: 3.7 M/UL (ref 4–5.2)
RESISTANT GENE NDM BY PCR: NOT DETECTED
S AUREUS DNA BLD POS QL NAA+NON-PROBE: NOT DETECTED
S AUREUS+CONS DNA BLD POS NAA+NON-PROBE: NOT DETECTED
S EPIDERMIDIS DNA BLD POS QL NAA+NON-PRB: NOT DETECTED
S LUGDUNENSIS DNA BLD POS QL NAA+NON-PRB: NOT DETECTED
S MALTOPHILIA DNA BLD POS QL NAA+NON-PRB: NOT DETECTED
S MARCESCENS DNA BLD POS NAA+NON-PROBE: NOT DETECTED
S PNEUM DNA BLD POS QL NAA+NON-PROBE: NOT DETECTED
S PYO DNA BLD POS QL NAA+NON-PROBE: NOT DETECTED
SALMONELLA DNA BLD POS QL NAA+NON-PROBE: NOT DETECTED
SERVICE CMNT-IMP: ABNORMAL
SODIUM SERPL-SCNC: 130 MMOL/L (ref 136–145)
SODIUM SERPL-SCNC: 132 MMOL/L (ref 136–145)
SODIUM UR-SCNC: <20 MMOL/L
SPECIMEN DESCRIPTION: ABNORMAL
STREPTOCOCCUS DNA BLD POS NAA+NON-PROBE: NOT DETECTED
WBC OTHER # BLD: 11.3 K/UL (ref 4–11)

## 2025-07-18 PROCEDURE — 6370000000 HC RX 637 (ALT 250 FOR IP): Performed by: STUDENT IN AN ORGANIZED HEALTH CARE EDUCATION/TRAINING PROGRAM

## 2025-07-18 PROCEDURE — 6370000000 HC RX 637 (ALT 250 FOR IP): Performed by: NURSE PRACTITIONER

## 2025-07-18 PROCEDURE — 80069 RENAL FUNCTION PANEL: CPT

## 2025-07-18 PROCEDURE — 2500000003 HC RX 250 WO HCPCS: Performed by: NURSE PRACTITIONER

## 2025-07-18 PROCEDURE — 84133 ASSAY OF URINE POTASSIUM: CPT

## 2025-07-18 PROCEDURE — 84300 ASSAY OF URINE SODIUM: CPT

## 2025-07-18 PROCEDURE — 82962 GLUCOSE BLOOD TEST: CPT

## 2025-07-18 PROCEDURE — 2060000000 HC ICU INTERMEDIATE R&B

## 2025-07-18 PROCEDURE — 6360000002 HC RX W HCPCS: Performed by: NURSE PRACTITIONER

## 2025-07-18 PROCEDURE — 2500000003 HC RX 250 WO HCPCS: Performed by: STUDENT IN AN ORGANIZED HEALTH CARE EDUCATION/TRAINING PROGRAM

## 2025-07-18 PROCEDURE — 6360000002 HC RX W HCPCS: Performed by: INTERNAL MEDICINE

## 2025-07-18 PROCEDURE — 83735 ASSAY OF MAGNESIUM: CPT

## 2025-07-18 PROCEDURE — 99233 SBSQ HOSP IP/OBS HIGH 50: CPT | Performed by: INTERNAL MEDICINE

## 2025-07-18 PROCEDURE — 2580000003 HC RX 258: Performed by: INTERNAL MEDICINE

## 2025-07-18 PROCEDURE — 6370000000 HC RX 637 (ALT 250 FOR IP): Performed by: INTERNAL MEDICINE

## 2025-07-18 PROCEDURE — 94640 AIRWAY INHALATION TREATMENT: CPT

## 2025-07-18 PROCEDURE — 94761 N-INVAS EAR/PLS OXIMETRY MLT: CPT

## 2025-07-18 PROCEDURE — 99233 SBSQ HOSP IP/OBS HIGH 50: CPT | Performed by: NURSE PRACTITIONER

## 2025-07-18 PROCEDURE — 83935 ASSAY OF URINE OSMOLALITY: CPT

## 2025-07-18 PROCEDURE — 2580000003 HC RX 258: Performed by: NURSE PRACTITIONER

## 2025-07-18 PROCEDURE — 84295 ASSAY OF SERUM SODIUM: CPT

## 2025-07-18 PROCEDURE — 85025 COMPLETE CBC W/AUTO DIFF WBC: CPT

## 2025-07-18 PROCEDURE — 2700000000 HC OXYGEN THERAPY PER DAY

## 2025-07-18 PROCEDURE — 36415 COLL VENOUS BLD VENIPUNCTURE: CPT

## 2025-07-18 RX ORDER — SODIUM CHLORIDE 9 MG/ML
INJECTION, SOLUTION INTRAVENOUS CONTINUOUS
Status: DISCONTINUED | OUTPATIENT
Start: 2025-07-18 | End: 2025-07-18

## 2025-07-18 RX ORDER — INSULIN LISPRO 100 [IU]/ML
0-16 INJECTION, SOLUTION INTRAVENOUS; SUBCUTANEOUS
Status: DISCONTINUED | OUTPATIENT
Start: 2025-07-18 | End: 2025-07-19 | Stop reason: HOSPADM

## 2025-07-18 RX ORDER — SODIUM CHLORIDE 9 MG/ML
INJECTION, SOLUTION INTRAVENOUS CONTINUOUS
Status: DISCONTINUED | OUTPATIENT
Start: 2025-07-19 | End: 2025-07-19

## 2025-07-18 RX ORDER — LANOLIN ALCOHOL/MO/W.PET/CERES
400 CREAM (GRAM) TOPICAL 2 TIMES DAILY
Status: DISCONTINUED | OUTPATIENT
Start: 2025-07-18 | End: 2025-07-19 | Stop reason: HOSPADM

## 2025-07-18 RX ADMIN — LEVETIRACETAM 500 MG: 500 TABLET, FILM COATED ORAL at 08:20

## 2025-07-18 RX ADMIN — GABAPENTIN 300 MG: 300 CAPSULE ORAL at 20:27

## 2025-07-18 RX ADMIN — CLONAZEPAM 1 MG: 1 TABLET ORAL at 05:45

## 2025-07-18 RX ADMIN — ACETAMINOPHEN 650 MG: 325 TABLET ORAL at 15:46

## 2025-07-18 RX ADMIN — Medication 10 ML: at 08:21

## 2025-07-18 RX ADMIN — SODIUM CHLORIDE: 0.9 INJECTION, SOLUTION INTRAVENOUS at 22:40

## 2025-07-18 RX ADMIN — METOPROLOL TARTRATE 6.25 MG: 25 TABLET, FILM COATED ORAL at 20:27

## 2025-07-18 RX ADMIN — SODIUM CHLORIDE, PRESERVATIVE FREE 10 ML: 5 INJECTION INTRAVENOUS at 08:21

## 2025-07-18 RX ADMIN — CLONAZEPAM 1 MG: 1 TABLET ORAL at 13:37

## 2025-07-18 RX ADMIN — ARFORMOTEROL TARTRATE: 15 SOLUTION RESPIRATORY (INHALATION) at 18:12

## 2025-07-18 RX ADMIN — ARFORMOTEROL TARTRATE: 15 SOLUTION RESPIRATORY (INHALATION) at 07:59

## 2025-07-18 RX ADMIN — DONEPEZIL HYDROCHLORIDE 5 MG: 5 TABLET, FILM COATED ORAL at 20:27

## 2025-07-18 RX ADMIN — POTASSIUM PHOSPHATE, MONOBASIC POTASSIUM PHOSPHATE, DIBASIC INJECTION, 30 MMOL: 236; 224 SOLUTION, CONCENTRATE INTRAVENOUS at 11:41

## 2025-07-18 RX ADMIN — ACETAMINOPHEN 650 MG: 325 TABLET ORAL at 20:26

## 2025-07-18 RX ADMIN — SODIUM CHLORIDE: 0.9 INJECTION, SOLUTION INTRAVENOUS at 11:21

## 2025-07-18 RX ADMIN — GABAPENTIN 300 MG: 300 CAPSULE ORAL at 08:20

## 2025-07-18 RX ADMIN — Medication 400 MG: at 20:28

## 2025-07-18 RX ADMIN — ROPINIROLE HYDROCHLORIDE 0.25 MG: 0.25 TABLET, FILM COATED ORAL at 08:20

## 2025-07-18 RX ADMIN — ENOXAPARIN SODIUM 30 MG: 100 INJECTION SUBCUTANEOUS at 13:37

## 2025-07-18 RX ADMIN — LEVETIRACETAM 500 MG: 500 TABLET, FILM COATED ORAL at 20:27

## 2025-07-18 RX ADMIN — ROSUVASTATIN CALCIUM 10 MG: 10 TABLET, FILM COATED ORAL at 16:47

## 2025-07-18 RX ADMIN — PANTOPRAZOLE SODIUM 40 MG: 40 TABLET, DELAYED RELEASE ORAL at 05:45

## 2025-07-18 RX ADMIN — OXYCODONE 5 MG: 5 TABLET ORAL at 22:14

## 2025-07-18 RX ADMIN — OXYCODONE 5 MG: 5 TABLET ORAL at 16:47

## 2025-07-18 RX ADMIN — ROPINIROLE HYDROCHLORIDE 0.25 MG: 0.25 TABLET, FILM COATED ORAL at 20:32

## 2025-07-18 RX ADMIN — ACETAMINOPHEN 650 MG: 325 TABLET ORAL at 05:45

## 2025-07-18 RX ADMIN — ASPIRIN 81 MG: 81 TABLET, COATED ORAL at 08:20

## 2025-07-18 RX ADMIN — CEFTRIAXONE SODIUM 2000 MG: 2 INJECTION, POWDER, FOR SOLUTION INTRAMUSCULAR; INTRAVENOUS at 11:00

## 2025-07-18 RX ADMIN — Medication 1 TABLET: at 08:20

## 2025-07-18 RX ADMIN — Medication 400 MG: at 11:17

## 2025-07-18 RX ADMIN — POTASSIUM CHLORIDE 40 MEQ: 1500 TABLET, EXTENDED RELEASE ORAL at 08:35

## 2025-07-18 RX ADMIN — SODIUM CHLORIDE, PRESERVATIVE FREE 10 ML: 5 INJECTION INTRAVENOUS at 20:29

## 2025-07-18 RX ADMIN — FERROUS SULFATE TAB 325 MG (65 MG ELEMENTAL FE) 325 MG: 325 (65 FE) TAB at 08:20

## 2025-07-18 RX ADMIN — IPRATROPIUM BROMIDE AND ALBUTEROL SULFATE 1 DOSE: .5; 2.5 SOLUTION RESPIRATORY (INHALATION) at 18:12

## 2025-07-18 RX ADMIN — TIOTROPIUM BROMIDE INHALATION SPRAY 5 MCG: 3.12 SPRAY, METERED RESPIRATORY (INHALATION) at 08:00

## 2025-07-18 RX ADMIN — OXYCODONE 5 MG: 5 TABLET ORAL at 10:53

## 2025-07-18 RX ADMIN — OXYCODONE 5 MG: 5 TABLET ORAL at 05:00

## 2025-07-18 RX ADMIN — INSULIN LISPRO 4 UNITS: 100 INJECTION, SOLUTION INTRAVENOUS; SUBCUTANEOUS at 11:58

## 2025-07-18 RX ADMIN — INSULIN LISPRO 12 UNITS: 100 INJECTION, SOLUTION INTRAVENOUS; SUBCUTANEOUS at 20:28

## 2025-07-18 ASSESSMENT — PAIN SCALES - GENERAL
PAINLEVEL_OUTOF10: 8
PAINLEVEL_OUTOF10: 7
PAINLEVEL_OUTOF10: 9
PAINLEVEL_OUTOF10: 0
PAINLEVEL_OUTOF10: 3
PAINLEVEL_OUTOF10: 7
PAINLEVEL_OUTOF10: 8
PAINLEVEL_OUTOF10: 6
PAINLEVEL_OUTOF10: 0
PAINLEVEL_OUTOF10: 7
PAINLEVEL_OUTOF10: 3
PAINLEVEL_OUTOF10: 2
PAINLEVEL_OUTOF10: 7
PAINLEVEL_OUTOF10: 6
PAINLEVEL_OUTOF10: 3
PAINLEVEL_OUTOF10: 0

## 2025-07-18 ASSESSMENT — PAIN DESCRIPTION - ORIENTATION
ORIENTATION: MID
ORIENTATION: RIGHT
ORIENTATION: RIGHT
ORIENTATION: MID
ORIENTATION: MID

## 2025-07-18 ASSESSMENT — ENCOUNTER SYMPTOMS
CONSTIPATION: 0
FACIAL SWELLING: 0
COUGH: 0
NAUSEA: 0
SINUS PAIN: 0
VOMITING: 0
PHOTOPHOBIA: 0
BLOOD IN STOOL: 0
ABDOMINAL PAIN: 0
DIARRHEA: 0
EYE DISCHARGE: 0
ABDOMINAL DISTENTION: 0
EYE REDNESS: 0
SHORTNESS OF BREATH: 0
CHEST TIGHTNESS: 0

## 2025-07-18 ASSESSMENT — PAIN - FUNCTIONAL ASSESSMENT

## 2025-07-18 ASSESSMENT — PAIN DESCRIPTION - DESCRIPTORS
DESCRIPTORS: ACHING
DESCRIPTORS: ACHING;DISCOMFORT;POUNDING;THROBBING
DESCRIPTORS: ACHING
DESCRIPTORS: ACHING;DISCOMFORT
DESCRIPTORS: ACHING;DISCOMFORT
DESCRIPTORS: DULL
DESCRIPTORS: ACHING
DESCRIPTORS: ACHING

## 2025-07-18 ASSESSMENT — PAIN DESCRIPTION - FREQUENCY
FREQUENCY: INTERMITTENT
FREQUENCY: INTERMITTENT
FREQUENCY: CONTINUOUS
FREQUENCY: INTERMITTENT
FREQUENCY: CONTINUOUS

## 2025-07-18 ASSESSMENT — PAIN DESCRIPTION - ONSET
ONSET: GRADUAL
ONSET: GRADUAL
ONSET: ON-GOING
ONSET: ON-GOING
ONSET: GRADUAL

## 2025-07-18 ASSESSMENT — PAIN DESCRIPTION - PAIN TYPE
TYPE: ACUTE PAIN
TYPE: CHRONIC PAIN
TYPE: ACUTE PAIN
TYPE: CHRONIC PAIN
TYPE: CHRONIC PAIN

## 2025-07-18 ASSESSMENT — PAIN DESCRIPTION - LOCATION
LOCATION: HEAD
LOCATION: GENERALIZED
LOCATION: SHOULDER
LOCATION: SHOULDER
LOCATION: BACK
LOCATION: HEAD

## 2025-07-18 ASSESSMENT — PAIN SCALES - WONG BAKER
WONGBAKER_NUMERICALRESPONSE: HURTS WHOLE LOT
WONGBAKER_NUMERICALRESPONSE: NO HURT
WONGBAKER_NUMERICALRESPONSE: NO HURT

## 2025-07-18 NOTE — PLAN OF CARE
Problem: Chronic Conditions and Co-morbidities  Goal: Patient's chronic conditions and co-morbidity symptoms are monitored and maintained or improved  Outcome: Progressing     Problem: Discharge Planning  Goal: Discharge to home or other facility with appropriate resources  Outcome: Progressing     Problem: Pain  Goal: Verbalizes/displays adequate comfort level or baseline comfort level  Outcome: Progressing     Problem: Safety - Adult  Goal: Free from fall injury  7/17/2025 2032 by La Esteban, RN  Outcome: Progressing  7/17/2025 1710 by Kalpana Artis, RN  Outcome: Progressing

## 2025-07-18 NOTE — DISCHARGE INSTR - COC
Continuity of Care Form    Patient Name: Viji Gamez   :  1950  MRN:  [id number]    Admit date:  7/15/2025  Discharge date:  2025      Code Status Order: Full Code   Advance Directives:     Admitting Physician:  Ca Reynolds MD  PCP: Azam De Luna MD    Discharging Nurse: Latoya Bahena RN    Discharging Hospital Unit/Room#: 2612/2612-01  Discharging Unit Phone Number: [phone]    Emergency Contact:   Extended Emergency Contact Information  Primary Emergency Contact: She Barber  Home Phone: [phone]  Relation: Brother/Sister  Secondary Emergency Contact: Jacqueline Gamez  Mobile Phone: [phone]  Relation: Brother/Sister  Preferred language: English   needed? No    Past Surgical History:  Past Surgical History:   Procedure Laterality Date    BACK SURGERY      CATARACT EXTRACTION         Immunization History:     There is no immunization history on file for this patient.    Active Problems:  Patient Active Problem List   Diagnosis Code    Sepsis ([facility]) A41.9    Hyponatremia E87.1    Acute encephalopathy G93.40    Headache R51.9    Pericardial effusion I31.39    Shortness of breath R06.02    Chest pain R07.9    Atrial fibrillation with rapid ventricular response ([facility]) I48.91    Hypotension I95.9    Hyperglycemia R73.9    Elevated troponin R79.89    Severe sepsis with acute organ dysfunction due to Gram negative bacteria ([facility]) A41.50, R65.20    Chronic respiratory failure with hypoxia ([facility]) J96.11    On supplemental oxygen by nasal cannula Z78.9    E coli bacteremia R78.81, B96.20    Urinary retention R33.9    E. coli UTI N39.0, B96.20    Neutrophilia D72.828    Fever and chills R50.9    Seizure disorder ([facility]) G40.909    Acute hypotension I95.9    Acute dyspnea R06.00       Isolation/Infection:   Isolation            No Isolation          Patient Infection Status    None to display              Nurse Assessment:  Last Vital Signs: /79   Pulse 79   Temp 97.7  Status Date: 7/16/25    Readmission Risk Assessment Score:  [facility] RISK OF UNPLANNED READMISSION 2.0             18.6 Total Score        Discharging to Facility/ Agency   [facility]  [address]   [place] [address]  Phone: [phone]  Fax: [phone]    [facility]  [address] [place] [address]  Phone: [phone]  Fax: [phone]               / signature: Electronically signed by Emily Coto RN on 7/19/25 at 2:09 PM EDT    PHYSICIAN SECTION    Prognosis: {Prognosis:3221193687}    Condition at Discharge: { Patient Condition:209742085}    Rehab Potential (if transferring to Rehab): {Prognosis:7204759434}    Recommended Labs or Other Treatments After Discharge: ***    Physician Certification: I certify the above information and transfer of Viji Gamez  is necessary for the continuing treatment of the diagnosis listed and that she requires {Admit to Appropriate Level of Care:94015} for {GREATER/LESS:468859014} 30 days.     Update Admission H&P: {CHP DME Changes in HandP:224728145}    PHYSICIAN SIGNATURE:  {Esignature:828557625}

## 2025-07-18 NOTE — PLAN OF CARE
Problem: Chronic Conditions and Co-morbidities  Goal: Patient's chronic conditions and co-morbidity symptoms are monitored and maintained or improved  7/18/2025 0910 by Krystyna Coburn RN  Outcome: Progressing  Flowsheets (Taken 7/18/2025 0817)  Care Plan - Patient's Chronic Conditions and Co-Morbidity Symptoms are Monitored and Maintained or Improved:   Monitor and assess patient's chronic conditions and comorbid symptoms for stability, deterioration, or improvement   Collaborate with multidisciplinary team to address chronic and comorbid conditions and prevent exacerbation or deterioration   Update acute care plan with appropriate goals if chronic or comorbid symptoms are exacerbated and prevent overall improvement and discharge  7/17/2025 2032 by La Esteban RN  Outcome: Progressing     Problem: Discharge Planning  Goal: Discharge to home or other facility with appropriate resources  7/18/2025 0910 by Krystyna Coburn RN  Outcome: Progressing  Flowsheets (Taken 7/18/2025 0817)  Discharge to home or other facility with appropriate resources:   Identify barriers to discharge with patient and caregiver   Arrange for needed discharge resources and transportation as appropriate   Identify discharge learning needs (meds, wound care, etc)   Refer to discharge planning if patient needs post-hospital services based on physician order or complex needs related to functional status, cognitive ability or social support system  7/17/2025 2032 by La Esteban RN  Outcome: Progressing     Problem: Pain  Goal: Verbalizes/displays adequate comfort level or baseline comfort level  7/18/2025 0910 by Krystyna Coburn RN  Outcome: Progressing  7/17/2025 2032 by La Esteban RN  Outcome: Progressing     Problem: Safety - Adult  Goal: Free from fall injury  7/18/2025 0910 by Krystyna Coburn RN  Outcome: Progressing  Flowsheets (Taken 7/18/2025 0909)  Free From Fall Injury: Instruct family/caregiver on patient safety  7/17/2025 2032  by La Esteban, RN  Outcome: Progressing

## 2025-07-18 NOTE — PROGRESS NOTES
[facility] Progress Note      Name:  Viji Gamez /Age/Sex: 1950  (75 y.o. female)   MRN & CSN:  [id number] & [id number] Encounter Date/Time: 2025 10:53 AM EDT   Location:  2612/2612-01 PCP: Azam De Luna MD     Attending:Litzy Jones MD       Hospital Day: 4          Impression   Viji Gamez is a 75 y.o. female with multiple medical problems (dementia, CAD, CVA, COPD and chronic hypoxic respiratory failure) presented with dizziness, chest pain and shortness of breath.  She was recently hospitalized for pericardial effusion and acute on chronic respiratory failure. On initial evaluation, patient was noted to be in A-fib with RVR.    Assessment and Plan   Assessment:  # A-fib with RVR (status post Watchman device). Currently rate controlled.  # Sepsis, leukocytosis, hypotension and fever.  Secondary to E. coli bacteremia, secondary to pyelonephritis.  # Urinary retention s/p Long. Long catheter was placed .  # Hyponatremia.  Improving.  # ARMIN CKD stage III, improving.  # Electrolyte derangements, hypokalemia, hypophosphatemia and hypomagnesemia.  # Elevated troponin, nonischemic.  # Hypotension. Resolved. Out of the ICU.     Co-morbidities:  # Diabetes mellitus.  # Peripheral neuropathy  # Restless leg syndrome.  # Anxiety  # Hypertension  # Hyperlipidemia  # Epilepsy on Keppra  # COPD.  # Chronic hypoxic respiratory failure, on 3 L at baseline  # CAD status post PCI  # History of CVA  # Dementia.  # Diastolic dysfunction per      Plan:  # Continue Rocephin as guided by ID, follow repeat blood cultures.  # Cardiology, signed off, patient is off Cardizem drip. Rate is currently controlled.  Patient is on small dose Lopressor given soft blood pressure.  # Nephrology consult.  Monitor renal function.  Replete electrolytes as needed.  Continue normal saline @ 80ml/hr.  # Urology consult noted, suspecting chronic degree of bladder dysfunction, recommending a void trial before  discharge. If she fails void trial, patient will be discharged with Long.  # PT/OT      DVT Prophylaxis: Lovenox.  Code Status: Full Code        Disposition   Expected Disposition: Patient lives at home, she has good support at home  Estimated Date of Discharge: 7/21/2025  Patient requires continued admission due to on IV antibiotics for bacteremia      Subjective:   Patient was seen and examined.  Overall feeling better.  She had some abdominal discomfort but that is improving.  Objective:     Intake/Output Summary (Last 24 hours) at 7/18/2025 1053  Last data filed at 7/18/2025 0842  Gross per 24 hour   Intake 1646.35 ml   Output 4500 ml   Net -2853.65 ml      Vitals:   Vitals:    07/18/25 0804 07/18/25 0830 07/18/25 0900 07/18/25 1000   BP:   (!) 132/103    Pulse: 84  86 86   Resp: 23 21 17   Temp:  97.7 °F (36.5 °C)     TempSrc:  Axillary     SpO2: 97%  98%    Weight:       Height:             Physical Exam:      Cardiovascular: Irregular  Respiratory: Clear to auscultation  Gastrointestinal: Soft, non tender  Skin: warm, dry  Neuro: Alert.          Medications:   Medications:    potassium phosphate IVPB (PERIPHERAL LINE)  30 mmol IntraVENous Once    cefTRIAXone (ROCEPHIN) IV  2,000 mg IntraVENous Q12H    metoprolol tartrate  6.25 mg Oral BID    insulin lispro  0-8 Units SubCUTAneous 4x Daily AC & HS    sodium chloride flush  5-40 mL IntraVENous 2 times per day    lidocaine 1 % injection  50 mg IntraDERmal Once    gabapentin  300 mg Oral BID    rosuvastatin  10 mg Oral QPM    enoxaparin  30 mg SubCUTAneous Daily    sodium chloride flush  5-40 mL IntraVENous 2 times per day    aspirin  81 mg Oral Daily    donepezil  5 mg Oral Nightly    ferrous sulfate  325 mg Oral Daily with breakfast    levETIRAcetam  500 mg Oral BID    arformoterol 15 mcg-budesonide 0.5 mg neb solution   Nebulization BID RT    therapeutic multivitamin-minerals  1 tablet Oral Daily    pantoprazole  40 mg Oral QAM AC    rOPINIRole  0.25 mg Oral

## 2025-07-18 NOTE — PROGRESS NOTES
Spiritual Health History and Assessment/Progress Note  [facility]    Spiritual/Emotional Needs,  ,  ,      Name: Viji Gamez MRN: [id number]    Age: 75 y.o.     Sex: female   Language: English   Scientology: Unknown   Chest pain     Date: 7/18/2025            Total Time Calculated: 3 min              Spiritual Assessment continued in [facility] 3 PROGRESSIVE CARE        Referral/Consult From: Rounding   Encounter Overview/Reason: Spiritual/Emotional Needs  Service Provided For: Patient    Ann-Marie, Belief, Meaning:   Patient Other: N/A  Family/Friends No family/friends present      Importance and Influence:  Patient Other: N/A  Family/Friends No family/friends present    Community:  Patient Other: N/A  Family/Friends No family/friends present    Assessment and Plan of Care:     Patient Interventions include: Other: Offered SHS/no needs at this time  Family/Friends Interventions include: No family/friends present    Patient Plan of Care: No spiritual needs identified for follow-up  Family/Friends Plan of Care: No family/friends present    Electronically signed by CLAUDY Gardner on 7/18/2025 at 2:48 PM

## 2025-07-18 NOTE — PROGRESS NOTES
[facility] Note    Patient Active Problem List   Diagnosis    Sepsis ([facility])    Hyponatremia    Acute encephalopathy    Headache    Pericardial effusion    Shortness of breath    Chest pain    Atrial fibrillation with rapid ventricular response (HCC)    Hypotension    Hyperglycemia    Elevated troponin    Severe sepsis with acute organ dysfunction due to Gram negative bacteria (HCC)    Chronic respiratory failure with hypoxia ([facility])    On supplemental oxygen by nasal cannula    E coli bacteremia    Urinary retention    E. coli UTI    Neutrophilia    Fever and chills    Seizure disorder (HCC)    Acute hypotension    Acute dyspnea       Past Medical History:   has a past medical history of A-fib ([facility]), COPD (chronic obstructive pulmonary disease) ([facility]), Diabetes mellitus ([facility]), H/O heart artery stent, Hypertension, and Presence of Watchman left atrial appendage closure device.    Past Social History:   reports that she has quit smoking. Her smoking use included cigarettes. She started smoking about 25 years ago. She has a 25.5 pack-year smoking history. She has never used smokeless tobacco. She reports current alcohol use. She reports that she does not use drugs.    Subjective:    Transferred to ICU for hypotension, now better.  Rate controlled.   No other complaints    Review of Systems   Constitutional:  Positive for fatigue. Negative for activity change, appetite change, chills, fever and unexpected weight change.   HENT:  Negative for congestion and facial swelling.    Eyes:  Negative for photophobia, discharge and redness.   Respiratory:  Negative for cough, chest tightness and shortness of breath.    Cardiovascular:  Negative for chest pain, palpitations and leg swelling.   Gastrointestinal:  Negative for abdominal distention, abdominal pain, blood in stool, constipation, diarrhea, nausea and vomiting.   Endocrine: Negative for cold intolerance, heat intolerance and polyuria.    Genitourinary:  Negative for decreased urine volume, difficulty urinating, flank pain and hematuria.   Musculoskeletal:  Negative for joint swelling and neck pain.   Neurological:  Negative for dizziness, seizures, syncope, speech difficulty, light-headedness and headaches.   Hematological:  Does not bruise/bleed easily.   Psychiatric/Behavioral:  Negative for agitation, confusion and hallucinations.        Objective:    ?5.5L urine output past 24H  -7L since admission  BP (!) 132/103   Pulse 86   Temp 97.7 °F (36.5 °C) (Axillary)   Resp 17   Ht 1.524 m (5')   Wt 54.2 kg (119 lb 6.4 oz)   SpO2 98%   BMI 23.32 kg/m²     Wt Readings from Last 3 Encounters:   07/15/25 54.2 kg (119 lb 6.4 oz)   07/15/25 53.1 kg (117 lb)   07/07/25 59 kg (130 lb)       BP Readings from Last 3 Encounters:   07/18/25 (!) 132/103   07/09/25 107/82   05/26/25 130/80       Chest- clear  Heart-irregular  Abd-soft  Ext- no edema    Labs  Hemoglobin   Date Value Ref Range Status   07/18/2025 10.4 (L) 12.0 - 16.0 g/dL Final     Hematocrit   Date Value Ref Range Status   07/18/2025 32.7 (L) 36.0 - 48.0 % Final     WBC   Date Value Ref Range Status   07/18/2025 11.3 (H) 4.0 - 11.0 k/uL Final     Platelets   Date Value Ref Range Status   07/18/2025 249 135 - 450 k/uL Final     Lab Results   Component Value Date    CREATININE 1.1 07/18/2025    BUN 20 07/18/2025     (L) 07/18/2025    K 3.3 (L) 07/18/2025    CL 95 (L) 07/18/2025    CO2 22 07/18/2025     CT without contrast    Impression:        Impression:    Bladder outlet obstruction. The urinary bladder is distended with contrast from yesterday's CT. Decompression needed.    Striated nephrogram in the left kidney. This is favored to be either secondary to acute pyelonephritis or sequelae of the bladder outlet obstruction. Differential diagnosis includes vascular causes such as renal vein thrombosis or renal artery  thrombosis.    Cholelithiasis with luminal distention of the

## 2025-07-18 NOTE — PROGRESS NOTES
Infectious Diseases Inpatient Progress Note        CHIEF COMPLAINT:     Fever  Chills  COPD  Complicated urinary tract infection  E. coli bacteremia  WBC elevation      HISTORY OF PRESENT ILLNESS:  75 y.o. female with significant history for diabetes, neuropathy, anxiety, depression, dementia, hypertension, admitted to hospital today due to shortness of breath dizziness found to be in atrial fibrillation and severe sepsis with WBC elevation Tmax 102 urine culture positive for E. coli blood cultures from admission positive for E. coli in 4 out of 4 bottles.  CT abdomen pelvis indicating distended bladder with outlet obstruction associated with a left nephritis Long catheter was placed urology consulted.  Due to ongoing fever sepsis bacteremia we are consulted for recommendations. She is now tx to ICU for dizziness and hypotension was seen by cardiology earlier for A fib.     Interval history patient transferred out of  ICU remains  in atrial fibrillation on rate control blood pressure improving remains on 3 L nasal cannula fever trending down WBC improving E. coli sensitivities reviewed    Past Medical History:    Past Medical History:   Diagnosis Date    A-fib (HCC)     COPD (chronic obstructive pulmonary disease) (HCC)     Diabetes mellitus (HCC)     H/O heart artery stent     Hypertension     Presence of Watchman left atrial appendage closure device        Past Surgical History:    Past Surgical History:   Procedure Laterality Date    BACK SURGERY      CATARACT EXTRACTION         Current Medications:    Outpatient Medications Marked as Taking for the 7/15/25 encounter (Hospital Encounter)   Medication Sig Dispense Refill    VITAMIN D PO Take by mouth      DULoxetine (CYMBALTA) 30 MG extended release capsule Take 1 capsule by mouth 2 times daily      desvenlafaxine succinate (PRISTIQ) 25 MG TB24 extended release tablet Take 1 tablet by mouth daily      amLODIPine (NORVASC) 5 MG tablet Take 1 tablet by mouth

## 2025-07-18 NOTE — PROGRESS NOTES
End of Shift Summary:    Significant Events:  - BP stable overnight, no acute events noted this shift  - PRN tylenol and oxy for pain, see MAR    Neuro: alert and oriented X4, able to make needs known    Cardiac: a-fib on telemetry    Respiratory: 3L nasal cannula baseline    GI: regular diet thin liquids, takes pills whole. ACHS accucheck with sliding scale coverage    : walters catheter in place    IV Access/Drips: right arm peripheral X2, right upper arm midline saline locked

## 2025-07-18 NOTE — PROGRESS NOTES
Pulmonary Progress Note      Reason for Consult: Hypotension/sepsis  Requesting Physician: Adama Reynolds      Subjective:     CHIEF COMPLAINT / HPI:      The patient is a 75 y.o. female with h/o AFIB s/p watchman, COPD, DM, HTN who presented from my pulmonology clinic with hypotension, tachyarrythmia, lightheadedness and dizziness.  She was admitted to PCU and found to septic with blood cultures and urine cultures positive for E.coli.  7/17/25 Ms Gamez is complaining of lightheadedness, headache and found to be hypotensive. She is being transferred to the ICU for further management and closer monitoring.      Interval events:  Stable BP overnight and no acute events noted after review of nursing notes  Remained on 3L nasal cannula which is her baseline    Past Medical History:      Diagnosis Date    A-fib (HCC)     COPD (chronic obstructive pulmonary disease) (HCC)     Diabetes mellitus (HCC)     H/O heart artery stent     Hypertension     Presence of Watchman left atrial appendage closure device       Past Surgical History:        Procedure Laterality Date    BACK SURGERY      CATARACT EXTRACTION       Current Medications:     potassium phosphate IVPB (PERIPHERAL LINE)  30 mmol IntraVENous Once    magnesium oxide  400 mg Oral BID    insulin lispro  0-16 Units SubCUTAneous 4x Daily AC & HS    cefTRIAXone (ROCEPHIN) IV  2,000 mg IntraVENous Q12H    metoprolol tartrate  6.25 mg Oral BID    sodium chloride flush  5-40 mL IntraVENous 2 times per day    lidocaine 1 % injection  50 mg IntraDERmal Once    gabapentin  300 mg Oral BID    rosuvastatin  10 mg Oral QPM    enoxaparin  30 mg SubCUTAneous Daily    sodium chloride flush  5-40 mL IntraVENous 2 times per day    aspirin  81 mg Oral Daily    donepezil  5 mg Oral Nightly    ferrous sulfate  325 mg Oral Daily with breakfast    levETIRAcetam  500 mg Oral BID    arformoterol 15 mcg-budesonide 0.5 mg neb solution   Nebulization BID RT    therapeutic multivitamin-minerals

## 2025-07-19 VITALS
HEIGHT: 60 IN | WEIGHT: 119.4 LBS | RESPIRATION RATE: 18 BRPM | TEMPERATURE: 98.2 F | HEART RATE: 90 BPM | OXYGEN SATURATION: 100 % | BODY MASS INDEX: 23.44 KG/M2 | DIASTOLIC BLOOD PRESSURE: 103 MMHG | SYSTOLIC BLOOD PRESSURE: 121 MMHG

## 2025-07-19 LAB
ALBUMIN: 3.3 G/DL (ref 3.4–5)
ANION GAP SERPL CALCULATED.3IONS-SCNC: 16 MMOL/L (ref 3–16)
BASOPHILS # BLD: 0.04 K/UL (ref 0–0.2)
BASOPHILS NFR BLD: 0 %
BUN SERPL-MCNC: 15 MG/DL (ref 7–20)
CALCIUM SERPL-MCNC: 9.1 MG/DL (ref 8.3–10.6)
CHLORIDE SERPL-SCNC: 96 MMOL/L (ref 99–110)
CO2 SERPL-SCNC: 22 MMOL/L (ref 21–32)
CREAT SERPL-MCNC: 1 MG/DL (ref 0.6–1.2)
EOSINOPHIL # BLD: 0.7 K/UL (ref 0–0.6)
EOSINOPHILS RELATIVE PERCENT: 5 %
ERYTHROCYTE [DISTWIDTH] IN BLOOD BY AUTOMATED COUNT: 13.4 % (ref 12.4–15.4)
GFR, ESTIMATED: 62 ML/MIN/1.73M2
GLUCOSE BLD-MCNC: 178 MG/DL (ref 70–99)
GLUCOSE BLD-MCNC: 226 MG/DL (ref 70–99)
GLUCOSE BLD-MCNC: 289 MG/DL (ref 70–99)
GLUCOSE SERPL-MCNC: 201 MG/DL (ref 70–99)
HCT VFR BLD AUTO: 37.5 % (ref 36–48)
HGB BLD-MCNC: 11.6 G/DL (ref 12–16)
IMM GRANULOCYTES # BLD AUTO: 0.48 K/UL (ref 0–0.5)
IMM GRANULOCYTES NFR BLD: 4 %
LYMPHOCYTES NFR BLD: 0.73 K/UL (ref 1–5.1)
LYMPHOCYTES RELATIVE PERCENT: 6 %
MAGNESIUM SERPL-MCNC: 1.6 MG/DL (ref 1.8–2.4)
MCH RBC QN AUTO: 27.8 PG (ref 26–34)
MCHC RBC AUTO-ENTMCNC: 30.9 G/DL (ref 31–36)
MCV RBC AUTO: 89.9 FL (ref 80–100)
MONOCYTES NFR BLD: 1.79 K/UL (ref 0–1.3)
MONOCYTES NFR BLD: 14 %
NEUTROPHILS NFR BLD: 71 %
NEUTS SEG NFR BLD: 9.11 K/UL (ref 1.7–7.7)
OSMOLALITY UR: 123 MOSM/KG (ref 390–1070)
PHOSPHATE SERPL-MCNC: 3 MG/DL (ref 2.5–4.9)
PLATELET # BLD AUTO: 274 K/UL (ref 135–450)
PMV BLD AUTO: 9.7 FL (ref 9.4–12.4)
POTASSIUM SERPL-SCNC: 4.4 MMOL/L (ref 3.5–5.1)
RBC # BLD AUTO: 4.17 M/UL (ref 4–5.2)
SODIUM SERPL-SCNC: 133 MMOL/L (ref 136–145)
WBC OTHER # BLD: 12.9 K/UL (ref 4–11)

## 2025-07-19 PROCEDURE — 94761 N-INVAS EAR/PLS OXIMETRY MLT: CPT

## 2025-07-19 PROCEDURE — 82962 GLUCOSE BLOOD TEST: CPT

## 2025-07-19 PROCEDURE — 2580000003 HC RX 258: Performed by: STUDENT IN AN ORGANIZED HEALTH CARE EDUCATION/TRAINING PROGRAM

## 2025-07-19 PROCEDURE — 80069 RENAL FUNCTION PANEL: CPT

## 2025-07-19 PROCEDURE — 6360000002 HC RX W HCPCS: Performed by: INTERNAL MEDICINE

## 2025-07-19 PROCEDURE — 94640 AIRWAY INHALATION TREATMENT: CPT

## 2025-07-19 PROCEDURE — 6370000000 HC RX 637 (ALT 250 FOR IP): Performed by: INTERNAL MEDICINE

## 2025-07-19 PROCEDURE — 97161 PT EVAL LOW COMPLEX 20 MIN: CPT

## 2025-07-19 PROCEDURE — 2700000000 HC OXYGEN THERAPY PER DAY

## 2025-07-19 PROCEDURE — 83735 ASSAY OF MAGNESIUM: CPT

## 2025-07-19 PROCEDURE — 6370000000 HC RX 637 (ALT 250 FOR IP): Performed by: NURSE PRACTITIONER

## 2025-07-19 PROCEDURE — 36415 COLL VENOUS BLD VENIPUNCTURE: CPT

## 2025-07-19 PROCEDURE — 97166 OT EVAL MOD COMPLEX 45 MIN: CPT

## 2025-07-19 PROCEDURE — 97530 THERAPEUTIC ACTIVITIES: CPT

## 2025-07-19 PROCEDURE — 97535 SELF CARE MNGMENT TRAINING: CPT

## 2025-07-19 PROCEDURE — 6370000000 HC RX 637 (ALT 250 FOR IP): Performed by: STUDENT IN AN ORGANIZED HEALTH CARE EDUCATION/TRAINING PROGRAM

## 2025-07-19 PROCEDURE — 2580000003 HC RX 258: Performed by: INTERNAL MEDICINE

## 2025-07-19 PROCEDURE — 85025 COMPLETE CBC W/AUTO DIFF WBC: CPT

## 2025-07-19 PROCEDURE — 6360000002 HC RX W HCPCS: Performed by: NURSE PRACTITIONER

## 2025-07-19 PROCEDURE — 2500000003 HC RX 250 WO HCPCS: Performed by: STUDENT IN AN ORGANIZED HEALTH CARE EDUCATION/TRAINING PROGRAM

## 2025-07-19 RX ORDER — ENOXAPARIN SODIUM 100 MG/ML
40 INJECTION SUBCUTANEOUS DAILY
Status: DISCONTINUED | OUTPATIENT
Start: 2025-07-19 | End: 2025-07-19 | Stop reason: HOSPADM

## 2025-07-19 RX ORDER — INSULIN GLARGINE 100 [IU]/ML
10 INJECTION, SOLUTION SUBCUTANEOUS DAILY
Status: DISCONTINUED | OUTPATIENT
Start: 2025-07-19 | End: 2025-07-19 | Stop reason: HOSPADM

## 2025-07-19 RX ORDER — METOPROLOL TARTRATE 25 MG/1
12.5 TABLET, FILM COATED ORAL 2 TIMES DAILY
Qty: 30 TABLET | Refills: 1 | Status: SHIPPED | OUTPATIENT
Start: 2025-07-19

## 2025-07-19 RX ORDER — IPRATROPIUM BROMIDE AND ALBUTEROL SULFATE 2.5; .5 MG/3ML; MG/3ML
1 SOLUTION RESPIRATORY (INHALATION)
Status: DISCONTINUED | OUTPATIENT
Start: 2025-07-19 | End: 2025-07-19 | Stop reason: HOSPADM

## 2025-07-19 RX ORDER — GABAPENTIN 400 MG/1
400 CAPSULE ORAL 2 TIMES DAILY
Status: DISCONTINUED | OUTPATIENT
Start: 2025-07-19 | End: 2025-07-19 | Stop reason: HOSPADM

## 2025-07-19 RX ORDER — ROSUVASTATIN CALCIUM 20 MG/1
40 TABLET, COATED ORAL EVERY EVENING
Status: DISCONTINUED | OUTPATIENT
Start: 2025-07-19 | End: 2025-07-19 | Stop reason: HOSPADM

## 2025-07-19 RX ORDER — SODIUM CHLORIDE 9 MG/ML
INJECTION, SOLUTION INTRAVENOUS CONTINUOUS
Status: DISCONTINUED | OUTPATIENT
Start: 2025-07-19 | End: 2025-07-19 | Stop reason: HOSPADM

## 2025-07-19 RX ORDER — MAGNESIUM SULFATE IN WATER 40 MG/ML
2000 INJECTION, SOLUTION INTRAVENOUS ONCE
Status: COMPLETED | OUTPATIENT
Start: 2025-07-19 | End: 2025-07-19

## 2025-07-19 RX ADMIN — PANTOPRAZOLE SODIUM 40 MG: 40 TABLET, DELAYED RELEASE ORAL at 07:00

## 2025-07-19 RX ADMIN — TIOTROPIUM BROMIDE INHALATION SPRAY 5 MCG: 3.12 SPRAY, METERED RESPIRATORY (INHALATION) at 07:21

## 2025-07-19 RX ADMIN — CEFTRIAXONE SODIUM 2000 MG: 2 INJECTION, POWDER, FOR SOLUTION INTRAMUSCULAR; INTRAVENOUS at 11:35

## 2025-07-19 RX ADMIN — MAGNESIUM SULFATE HEPTAHYDRATE 2000 MG: 40 INJECTION, SOLUTION INTRAVENOUS at 12:20

## 2025-07-19 RX ADMIN — ASPIRIN 81 MG: 81 TABLET, COATED ORAL at 08:51

## 2025-07-19 RX ADMIN — METOPROLOL TARTRATE 6.25 MG: 25 TABLET, FILM COATED ORAL at 08:50

## 2025-07-19 RX ADMIN — OXYCODONE 5 MG: 5 TABLET ORAL at 15:23

## 2025-07-19 RX ADMIN — GABAPENTIN 300 MG: 300 CAPSULE ORAL at 08:50

## 2025-07-19 RX ADMIN — CLONAZEPAM 1 MG: 1 TABLET ORAL at 08:49

## 2025-07-19 RX ADMIN — SODIUM CHLORIDE: 0.9 INJECTION, SOLUTION INTRAVENOUS at 12:18

## 2025-07-19 RX ADMIN — ARFORMOTEROL TARTRATE: 15 SOLUTION RESPIRATORY (INHALATION) at 07:21

## 2025-07-19 RX ADMIN — Medication 400 MG: at 08:50

## 2025-07-19 RX ADMIN — FERROUS SULFATE TAB 325 MG (65 MG ELEMENTAL FE) 325 MG: 325 (65 FE) TAB at 08:50

## 2025-07-19 RX ADMIN — INSULIN LISPRO 8 UNITS: 100 INJECTION, SOLUTION INTRAVENOUS; SUBCUTANEOUS at 12:47

## 2025-07-19 RX ADMIN — INSULIN GLARGINE 10 UNITS: 100 INJECTION, SOLUTION SUBCUTANEOUS at 11:36

## 2025-07-19 RX ADMIN — ROPINIROLE HYDROCHLORIDE 0.25 MG: 0.25 TABLET, FILM COATED ORAL at 08:50

## 2025-07-19 RX ADMIN — LEVETIRACETAM 500 MG: 500 TABLET, FILM COATED ORAL at 08:50

## 2025-07-19 RX ADMIN — SODIUM CHLORIDE, PRESERVATIVE FREE 10 ML: 5 INJECTION INTRAVENOUS at 08:51

## 2025-07-19 RX ADMIN — OXYCODONE 5 MG: 5 TABLET ORAL at 07:01

## 2025-07-19 RX ADMIN — INSULIN LISPRO 4 UNITS: 100 INJECTION, SOLUTION INTRAVENOUS; SUBCUTANEOUS at 08:49

## 2025-07-19 RX ADMIN — IPRATROPIUM BROMIDE AND ALBUTEROL SULFATE 1 DOSE: 2.5; .5 SOLUTION RESPIRATORY (INHALATION) at 08:26

## 2025-07-19 RX ADMIN — Medication 1 TABLET: at 08:49

## 2025-07-19 ASSESSMENT — PAIN DESCRIPTION - PAIN TYPE
TYPE: ACUTE PAIN;CHRONIC PAIN
TYPE: CHRONIC PAIN
TYPE: CHRONIC PAIN

## 2025-07-19 ASSESSMENT — PAIN DESCRIPTION - ORIENTATION
ORIENTATION: RIGHT;LEFT
ORIENTATION: MID
ORIENTATION: MID

## 2025-07-19 ASSESSMENT — ENCOUNTER SYMPTOMS
FACIAL SWELLING: 0
EYE DISCHARGE: 0
SHORTNESS OF BREATH: 0
DIARRHEA: 0
ABDOMINAL DISTENTION: 0
COUGH: 0
PHOTOPHOBIA: 0
NAUSEA: 0
CONSTIPATION: 0
BLOOD IN STOOL: 0
ABDOMINAL PAIN: 0
VOMITING: 0
EYE REDNESS: 0
CHEST TIGHTNESS: 0

## 2025-07-19 ASSESSMENT — PAIN SCALES - WONG BAKER
WONGBAKER_NUMERICALRESPONSE: NO HURT
WONGBAKER_NUMERICALRESPONSE: NO HURT

## 2025-07-19 ASSESSMENT — PAIN SCALES - GENERAL
PAINLEVEL_OUTOF10: 0
PAINLEVEL_OUTOF10: 0
PAINLEVEL_OUTOF10: 8
PAINLEVEL_OUTOF10: 7
PAINLEVEL_OUTOF10: 3

## 2025-07-19 ASSESSMENT — PAIN DESCRIPTION - ONSET
ONSET: GRADUAL

## 2025-07-19 ASSESSMENT — PAIN - FUNCTIONAL ASSESSMENT
PAIN_FUNCTIONAL_ASSESSMENT: PREVENTS OR INTERFERES SOME ACTIVE ACTIVITIES AND ADLS
PAIN_FUNCTIONAL_ASSESSMENT: ACTIVITIES ARE NOT PREVENTED
PAIN_FUNCTIONAL_ASSESSMENT: ACTIVITIES ARE NOT PREVENTED

## 2025-07-19 ASSESSMENT — PAIN DESCRIPTION - DESCRIPTORS
DESCRIPTORS: ACHING;DISCOMFORT
DESCRIPTORS: ACHING;DISCOMFORT

## 2025-07-19 ASSESSMENT — PAIN DESCRIPTION - LOCATION
LOCATION: BACK;NECK
LOCATION: BACK;NECK
LOCATION: GENERALIZED

## 2025-07-19 ASSESSMENT — PAIN DESCRIPTION - FREQUENCY
FREQUENCY: INTERMITTENT
FREQUENCY: INTERMITTENT
FREQUENCY: CONTINUOUS

## 2025-07-19 NOTE — PROGRESS NOTES
Pharmacist Review and Automatic Dose Adjustment of Prophylactic Enoxaparin    The reviewing pharmacist has made an adjustment to the ordered enoxaparin dose or converted to UFH per the approved [facility] protocol and table as identified below.        Viji Gamez is a 75 y.o. female.     Recent Labs     07/17/25  1904 07/18/25  0649 07/19/25  0452   CREATININE 1.2 1.1 1.0       Estimated Creatinine Clearance: 36 mL/min (based on SCr of 1 mg/dL).    Recent Labs     07/18/25  0649 07/19/25  0452   HGB 10.4* 11.6*   HCT 32.7* 37.5    274     No results for input(s): \"INR\" in the last 72 hours.    Height:   Ht Readings from Last 1 Encounters:   07/15/25 1.524 m (5')     Weight:  Wt Readings from Last 1 Encounters:   07/15/25 54.2 kg (119 lb 6.4 oz)       Plan: Based upon the patient's weight and renal function, the ordered enoxaparin dose of 30 mg daily has been changed/converted to 40 mg daily.      Thank you,  Lincoln Cardona RPH  7/19/2025, 1:11 PM

## 2025-07-19 NOTE — RT PROTOCOL NOTE
RT Inhaler-Nebulizer Bronchodilator Protocol Note    There is a bronchodilator order in the chart from a provider indicating to follow the RT Bronchodilator Protocol and there is an “Initiate RT Inhaler-Nebulizer Bronchodilator Protocol” order as well (see protocol at bottom of note).    CXR Findings:  No results found.    The findings from the last RT Protocol Assessment were as follows:   History Pulmonary Disease: Chronic pulmonary disease  Respiratory Pattern: Regular pattern and RR 12-20 bpm  Breath Sounds: Slightly diminished and/or crackles  Cough: Strong, spontaneous, non-productive  Indication for Bronchodilator Therapy: On home bronchodilators  Bronchodilator Assessment Score: 4  Changed Duoneb to BID, per protocol.    Aerosolized bronchodilator medication orders have been revised according to the RT Inhaler-Nebulizer Bronchodilator Protocol below.    Respiratory Therapist to perform RT Therapy Protocol Assessment initially then follow the protocol.  Repeat RT Therapy Protocol Assessment PRN for score 0-3 or on second treatment, BID, and PRN for scores above 3.    No Indications - adjust the frequency to every 6 hours PRN wheezing or bronchospasm, if no treatments needed after 48 hours then discontinue using Per Protocol order mode.     If indication present, adjust the RT bronchodilator orders based on the Bronchodilator Assessment Score as indicated below.  Use Inhaler orders unless patient has one or more of the following: on home nebulizer, not able to hold breath for 10 seconds, is not alert and oriented, cannot activate and use MDI correctly, or respiratory rate 25 breaths per minute or more, then use the equivalent nebulizer order(s) with same Frequency and PRN reasons based on the score.  If a patient is on this medication at home then do not decrease Frequency below that used at home.    0-3 - enter or revise RT bronchodilator order(s) to equivalent RT Bronchodilator order with Frequency of every

## 2025-07-19 NOTE — CARE COORDINATION
DISCHARGE ORDER  Date/Time 2025 2:05 PM  Completed by: Emily Coto RN, Case Management    Patient Name: Viji Gamez      : 1950  Admitting Diagnosis: Hyponatremia [E87.1]  Chest pain [R07.9]  Elevated troponin [R79.89]  Atrial fibrillation with rapid ventricular response (HCC) [I48.91]  Acute dyspnea [R06.00]  Pneumonia of left lower lobe due to infectious organism [J18.9]  Chest pain, unspecified type [R07.9]  Sepsis (HCC) [A41.9]      Admit order Date and Status:25  (verify MD's last order for status of admission)      Noted discharge order.   If applicable PT/OT recommendation at Discharge: HHC  DME recommendation by PT/OT:gordon    Discharge Plan: Pt to dc home w/ HHC and baseline oxygen. Pt sent home with [employer] etank due to not having POC at home, only stationary.     Home Care Information:   Is patient resuming current home health care services: yes    Home Care Agency:   [facility] ([facility]  [address] [address]  Phone: [phone]  Fax: [phone]           Home Health Order Obtained: Y    Home health agency notified of discharge.      Home Oxygen and Respiratory Equipment:     Has HOME OXYGEN prior to admission: Yes    If applicable: Informed of need to bring portable home O2 tank on day of DISCHARGE for nursing to connect prior to leaving: Yes  Verbalized agreement/Understanding: Yes    Current Liter Flow/min:  3 LPM    Oxygen Provider:   [facility]  [address] [place] [address]  Phone: [phone]  Fax: [phone]       Home oxygen evaluation and DME order on file: y     Date of Last IMM Given: na    Reviewed chart.  Role of discharge planner explained and patient verbalized understanding. Discharge order is noted.      Pt is being d/c'd to home today. Pt's O2 sats are 100% on 3L.    Discharge timeout done with CM/PT/RN. All discharge needs and concerns addressed.

## 2025-07-19 NOTE — PLAN OF CARE
Problem: Chronic Conditions and Co-morbidities  Goal: Patient's chronic conditions and co-morbidity symptoms are monitored and maintained or improved  7/19/2025 0904 by Latoya Bahena RN  Outcome: Progressing  7/18/2025 2149 by Therese Graf RN  Outcome: Progressing     Problem: Discharge Planning  Goal: Discharge to home or other facility with appropriate resources  7/19/2025 0904 by Latoya Bahena RN  Outcome: Progressing  7/18/2025 2149 by hTerese Graf RN  Outcome: Progressing     Problem: Pain  Goal: Verbalizes/displays adequate comfort level or baseline comfort level  7/19/2025 0904 by Latoya Bahena RN  Outcome: Progressing  7/18/2025 2149 by Therese Graf RN  Outcome: Progressing  Flowsheets (Taken 7/18/2025 1148 by Krystyna Coburn, RN)  Verbalizes/displays adequate comfort level or baseline comfort level:   Encourage patient to monitor pain and request assistance   Assess pain using appropriate pain scale   Administer analgesics based on type and severity of pain and evaluate response   Implement non-pharmacological measures as appropriate and evaluate response   Notify Licensed Independent Practitioner if interventions unsuccessful or patient reports new pain     Problem: Safety - Adult  Goal: Free from fall injury  7/19/2025 0904 by Latoya Bahena RN  Outcome: Progressing  7/18/2025 2149 by Therese Graf RN  Outcome: Progressing     Problem: Occupational Therapy - Adult  Goal: By Discharge: Performs self-care activities at highest level of function for planned discharge setting.  See evaluation for individualized goals.  7/19/2025 0830 by Deepak Hauser, OT  Outcome: Progressing

## 2025-07-19 NOTE — DISCHARGE SUMMARY
Discharge Summary    Name:  Viji Gamez /Age/Sex: 1950 (75 y.o. female)   Admit Date: 7/15/2025  Discharge Date: 25    MRN & CSN:  [id number] & [id number] Encounter Date and Time 25 5:45 PM EDT    Attending:  Litzy Jones MD Discharging Provider: Litzy Jones MD         Discharge Diagnosis:   # A-fib with RVR (status post Watchman device).   # Sepsis, leukocytosis, hypotension and fever.  Secondary to E. coli bacteremia, secondary to pyelonephritis.  # Urinary retention s/p Long. Long catheter was placed .  # Hyponatremia.    # ARMIN CKD stage III, resolved.  # Electrolyte derangements, hypokalemia, hypophosphatemia and hypomagnesemia.  # Elevated troponin, nonischemic.  # Hypotension. Resolved.      Co-morbidities:  # Diabetes mellitus.  # Peripheral neuropathy  # Restless leg syndrome.  # Anxiety  # Hypertension  # Hyperlipidemia  # Epilepsy on Keppra  # COPD.  # Chronic hypoxic respiratory failure, on 3 L at baseline  # CAD status post PCI  # History of CVA  # Dementia.  # Diastolic dysfunction per        Hospital Course:   Viji Gamez is a 75 y.o. female with multiple medical problems (dementia, CAD, CVA, COPD and chronic hypoxic respiratory failure) presented with dizziness, chest pain and shortness of breath.  She was recently hospitalized for pericardial effusion and acute on chronic respiratory failure. On initial evaluation, patient was noted to be in A-fib with RVR.   Patient became hypotensive and septic.  She was admitted to the ICU.  Workup revealed pyelonephritis and E. coli bacteremia.  She was seen by ID in consultation.  Repeat blood cultures remain negative.  At the time of discharge, patient was transitioned to Augmentin for 7 days per ID recommendation.  Patient also developed urinary retention requiring Long catheter.  She was seen by urology in consultation.  Urology recommended maintaining Long catheter at discharge.  Patient to follow-up

## 2025-07-19 NOTE — PROGRESS NOTES
[facility] - Rehabilitation Department       Occupational Therapy  3215/3215-01  [facility] 3 PROGRESSIVE CARE    [x] Initial Evaluation            [x] Daily Treatment Note         [] Discharge Summary      Patient: Viji Gamez   : 1950   MRN: [id number]   Date of Service:  2025  Discharge Recommendations: Home with home health OT.          DME Required For Discharge: no DME required at discharge    Therapy discharge recommendations take into account each patient's current medical complexities and are subject to input/oversight from the patient's healthcare team.     Barriers to Home Discharge:   [] Steps to access home entry or bed/bath:   [] Unable to transfer, ambulate, or propel wheelchair household distances without assist   [] Limited available assist at home upon discharge    [] Patient or family requests d/c to post-acute facility    [] Poor cognition/safety awareness for d/c to home alone    []Unable to maintain ordered weight bearing status    [] Patient with salient signs of long-standing immobility   [x] Patient is at risk for falls due to: Decreased activity while ill.   [] Other:    If pt is unable to be seen after this session, please let this note serve as discharge summary.  Please see case management note for discharge disposition.  Thank you.  Admitting Diagnosis:  Chest pain  Referring Physician: Dr. Jones  Current Admission Summary: The patient is a 75 y.o. female with h/o AFIB s/p watchman, COPD, DM, HTN who presented from my pulmonology clinic with hypotension, tachyarrythmia, lightheadedness and dizziness.  She was admitted to PCU and found to septic with blood cultures and urine cultures positive for E.coli.  27 Ms Gamez is complaining of lightheadedness, headache, and left flank pain.    Past Medical History:  has a past medical history of A-fib (HCC), COPD (chronic obstructive pulmonary disease) (HCC), Diabetes mellitus (HCC), H/O heart artery stent,    Vitals:  Pt Position BP (mmHg) HR (bpm) SpO2 (%) on 3L  RR Comments   Semi-fowlers 142/104 96 100     After transfer to chair   93                          Lines, Drains, & Tubes: Long Indwelling Catheter, Oxygen 3 liters, and IV    Activity Tolerance : Pt tolerated OT evaluation with min difficulty due to some decreased balance in functional mobility as well as pain throughout session.     Activities of Daily Living   Basic Activities of Daily Living  Grooming: stand by assistance  Grooming Comments: Sat at edge of bed for 15 minutes with SBA for dynamic sitting balance.  Lower Extremity Dressing: stand by assistance  Dressing Comments: Slight loss of balance while performing lower body tasks.   Comments: verbal cues for sequencing, verbal cues for weight bearing precautions, verbal cues for compensatory strategies, verbal/tactile cues for technique, and verbal cues for safety  Instrumental Activities of Daily Living  No IADL completed on this date.    Functional Mobility   Bed Mobility:  Supine to Sit: Independent  Comments:   Transfers:  Sit to stand transfer: Contact guard assist  Bed / Chair transfer: Contact guard assist  Bed / Chair comments: One instance of LOB as legs slightly buckled, but recovered.  Comments: verbal/tactile cues for hand placement, verbal/tactile cues for technique, and verbal cues for safety  Balance:  SITTING BALANCE  [] Patient requires use of bed rail for static/dynamic sitting   [x] Patient requires assist for static/dynamic sitting  with assist  up to SBA  [] Patient able to complete in room static and dynamic sitting balance activity independently without assistive device.   Comments:   STANDING BALANCE  [] Patient requires use of assistive device  for static/dynamic standing   [] Patient requires assist for static/dynamic standing with assist  up to CGA  [] Patient able to complete in room static and dynamic standing balance activity independently without assistive device.

## 2025-07-19 NOTE — PROGRESS NOTES
[facility]    Patient Active Problem List   Diagnosis    Sepsis ([facility])    Hyponatremia    Acute encephalopathy    Headache    Pericardial effusion    Shortness of breath    Chest pain    Atrial fibrillation with rapid ventricular response (HCC)    Hypotension    Hyperglycemia    Elevated troponin    Severe sepsis with acute organ dysfunction due to Gram negative bacteria (HCC)    Chronic respiratory failure with hypoxia ([facility])    On supplemental oxygen by nasal cannula    E coli bacteremia    Urinary retention    E. coli UTI    Neutrophilia    Fever and chills    Seizure disorder (HCC)    Acute hypotension    Acute dyspnea       Past Medical History:   has a past medical history of A-fib ([facility]), COPD (chronic obstructive pulmonary disease) ([facility]), Diabetes mellitus ([facility]), H/O heart artery stent, Hypertension, and Presence of Watchman left atrial appendage closure device.    Past Social History:   reports that she has quit smoking. Her smoking use included cigarettes. She started smoking about 25 years ago. She has a 25.5 pack-year smoking history. She has never used smokeless tobacco. She reports current alcohol use. She reports that she does not use drugs.    Subjective:        Out of the ICU  Remains polyuric with 4.6L UOP yesterday  Net negative 9.6L from admission     Hypotension imprved, BP now higher    Is on 3L NC - at baseline wears 3L    Aide at bedside     Review of Systems   Constitutional:  Positive for fatigue. Negative for activity change, appetite change, chills, fever and unexpected weight change.   HENT:  Negative for congestion and facial swelling.    Eyes:  Negative for photophobia, discharge and redness.   Respiratory:  Negative for cough, chest tightness and shortness of breath.    Cardiovascular:  Negative for chest pain, palpitations and leg swelling.   Gastrointestinal:  Negative for abdominal distention, abdominal pain, blood in stool, constipation, diarrhea,

## 2025-07-19 NOTE — PLAN OF CARE
Problem: Physical Therapy - Adult  Goal: By Discharge: Performs mobility at highest level of function for planned discharge setting.  See evaluation for individualized goals.  Outcome: Progressing     Tata Chicas, PT

## 2025-07-19 NOTE — PLAN OF CARE
Problem: Chronic Conditions and Co-morbidities  Goal: Patient's chronic conditions and co-morbidity symptoms are monitored and maintained or improved  7/18/2025 2149 by Therese Graf RN  Outcome: Progressing  7/18/2025 0910 by Krystyna Coburn RN  Outcome: Progressing  Flowsheets (Taken 7/18/2025 0817)  Care Plan - Patient's Chronic Conditions and Co-Morbidity Symptoms are Monitored and Maintained or Improved:   Monitor and assess patient's chronic conditions and comorbid symptoms for stability, deterioration, or improvement   Collaborate with multidisciplinary team to address chronic and comorbid conditions and prevent exacerbation or deterioration   Update acute care plan with appropriate goals if chronic or comorbid symptoms are exacerbated and prevent overall improvement and discharge     Problem: Discharge Planning  Goal: Discharge to home or other facility with appropriate resources  7/18/2025 2149 by Therese Graf RN  Outcome: Progressing  7/18/2025 0910 by Krystyna Coburn RN  Outcome: Progressing  Flowsheets (Taken 7/18/2025 0817)  Discharge to home or other facility with appropriate resources:   Identify barriers to discharge with patient and caregiver   Arrange for needed discharge resources and transportation as appropriate   Identify discharge learning needs (meds, wound care, etc)   Refer to discharge planning if patient needs post-hospital services based on physician order or complex needs related to functional status, cognitive ability or social support system     Problem: Pain  Goal: Verbalizes/displays adequate comfort level or baseline comfort level  7/18/2025 2149 by Therese Graf RN  Outcome: Progressing  Flowsheets (Taken 7/18/2025 1148 by Krystyna Coburn RN)  Verbalizes/displays adequate comfort level or baseline comfort level:   Encourage patient to monitor pain and request assistance   Assess pain using appropriate pain scale   Administer analgesics based on type and severity of pain and evaluate

## 2025-07-19 NOTE — PROGRESS NOTES
Physician Progress Note      PATIENT:               NIRMALA CARUSO  [facility] #:                  [id number]  :                       1950  ADMIT DATE:       7/15/2025 10:33 AM  DISCH DATE:  RESPONDING  PROVIDER #:        LAW JONES          QUERY TEXT:    Sepsis and acute organ dysfunction of ARMIN are documented in the medical record   in  Internal Medicine Progress note . Please clarify the relationship, if   any, between Sepsis and ARMIN.    The clinical indicators include:  75 y.o. patient with Pmh of COPD on chronic O2, CAD s/p PCI, Afib with   Watchman, CVA, chronic diastolic heart failure, h/o pericardial effusion    Lab results 7/15 Lactic Acid, sepsis was 1.1 and WBC=19.4.    Vital Sign Flowsheet 7/15 Temperature trend 98.7->98.4->100.4->99.5 with Tmax   on  of 102     Internal Medicine note \"Sepsis, occurred in hospital 2025-Patient   developed fever 2025 and leukocytosis persisted. Initially felt not to be   septic on admission as her elevated heart rate was secondary to her A-fib and   she was afebrile. So she did not meet full sepsis criteria's.-Blood   cultures...2 out of 2 sets are positive for E. Coli- Continue cefepime-Daily   labs-stat CT abdomen...Did show bladder outlet obstruction and   pyelonephritis...ARMIN in CKD stage III- Baseline creatinine 0.6, creatinine   today 1.3- Secondary to bladder outlet obstruction, diarrhea likely worsened   dehydration as well\"     Infectious Disease consult \"found to be in atrial fibrillation and severe   sepsis with WBC elevation Tmax 102 urine culture positive for E. coli blood   cultures from admission positive for E. coli in 4 out of 4 bottles\"     Urology consult \"-CT showed some pyelonephritis but no signs of renal   obstruction\"  Options provided:  -- Acute organ dysfunction evidenced by ARMIN is related to sepsis  -- ARMIN is unrelated to sepsis  -- Other - I will add my own diagnosis  -- Disagree - Not applicable / Not

## 2025-07-19 NOTE — PROGRESS NOTES
[facility] - Rehabilitation Department      Physical Therapy  3215/3215-01  [facility] 3 PROGRESSIVE CARE    [x] Initial Evaluation            [x] Daily Treatment Note         [] Discharge Summary      Patient: Viji Gamez   : 1950   MRN: [id number]   Date of Service:  2025   Discharge Recommendations: home with 24 hour assist and HHPT   [facility] Raw Score:   AM-PAC Inpatient Mobility Raw Score : 13            DME Required For Discharge:   no DME required at discharge  Therapy discharge recommendations take into account each patient's current medical complexities and are subject to input/oversight from the patient's healthcare team.   Barriers to Home Discharge:   [] Steps to access home entry or bed/bath:   [x] Unable to transfer, ambulate, or propel wheelchair household distances without assist   [x] Limited available assist at home upon discharge    [] Patient or family requests d/c to post-acute facility    [x] Poor cognition/safety awareness for d/c to home alone    []Unable to maintain ordered weight bearing status    [] Patient with salient signs of long-standing immobility   [x] Patient is at risk for falls due to: B knee buckling intermittently   [] Other:  If pt is unable to be seen after this session, please let this note serve as discharge summary.  Please see case management note for discharge disposition.  Thank you.  Admitting Diagnosis: Chest pain  Ordering Physician: Litzy Jones MD   Current Admission Summary: Litzy Jones MD H&P \"  History of Present Illness:      Viji Gamez is a 75 y.o. female with pmh of peripheral neuropathy, anxiety, hypertension, RLS, hyperlipidemia, history of hip fracture, epilepsy on Keppra, COPD, chronic hypoxic respiratory failure on 3 L baseline, atrial fibrillation with watchman in place, DM, CAD status post PCI, CVA, dementia  who presents with chest pain, shortness of breath, and dizziness.  Patient was recently discharged  focal consolidation. No suspicious pulmonary nodule or mass. Major airways are patent.    PLEURA: No pleural effusion or pneumothorax.    CHEST WALL AND SOFT TISSUES: Unremarkable.    UPPER ABDOMEN: No acute abnormality.    BONES: No acute or suspicious abnormality. A remote compression fracture is seen in the mid thoracic spine.      Impression:       1.  No pulmonary embolism.  2.  Mild cardiomegaly with trace pericardial effusion.  3.  Coronary artery calcified lesions are present.     7/16  CT ABDOMEN PELVIS WO CONTRAST Additional Contras Impression:     Bladder outlet obstruction. The urinary bladder is distended with contrast from yesterday's CT. Decompression needed.     Striated nephrogram in the left kidney. This is favored to be either secondary to acute pyelonephritis or sequelae of the bladder outlet obstruction. Differential diagnosis includes vascular causes such as renal vein thrombosis or renal artery   thrombosis.     Cholelithiasis with luminal distention of the gallbladder. No evidence of acute cholecystitis.     Small pericardial effusio     Past Medical History:  has a past medical history of A-fib (HCC), COPD (chronic obstructive pulmonary disease) (HCC), Diabetes mellitus (HCC), H/O heart artery stent, Hypertension, and Presence of Watchman left atrial appendage closure device.  Past Surgical History:  has a past surgical history that includes Cataract extraction and back surgery.  _____________________________________________________________________________________________________________________________________________  Assessment/Clinical Assessment   Pt seen today for physical therapy Evaluation & Treatment.   Pt demonstrated decreased Activity tolerance, Functional Mobility , Balance, Safety, and Strengthas well as decreased independence with Ambulation, Transfers, and Disease Specific Educational Needs.   Recommending Home 24 hr assist and with home PT upon discharge as patient

## 2025-07-19 NOTE — PROGRESS NOTES
[facility]    Pharmacy Renal Dose-Adjustment Communication     Rosuvastatin 10 mg daily and gabapentin 300 mg twice daily ordered for patient.  This medication is renally eliminated.  Will change back to home doses of rosuvastatin 40 mg daily and gabapentin 400 mg twice daily per renal dose adjustment policy.     Estimated Creatinine Clearance: 28 mL/min (A) (based on SCr of 1.3 mg/dL (H)).     Pharmacy will continue to monitor renal function and adjust dose as necessary.    Please call with any questions.    Thanks!    Lincoln Cardona PharmD  [facility]   [phone]  7/16/2025   2:45 PM

## 2025-07-19 NOTE — CONSULTS
Urology Attending Consult Note      Reason for Consultation: urinary retention    History: 76 y/o female with mutiple medical problems was admitted with Afib with RVR and fond to be in urinary retention.  She is weak and deconditioned.  Renal function is improving with walters.    Family History, Social History, Review of Systems:  Reviewed and agreed to as per chart    Vitals:  BP (!) 150/89   Pulse 84   Temp 98.6 °F (37 °C) (Oral)   Resp 16   Ht 1.524 m (5')   Wt 54.2 kg (119 lb 6.4 oz)   SpO2 100%   BMI 23.32 kg/m²   Temp  Av.7 °F (37.1 °C)  Min: 98.4 °F (36.9 °C)  Max: 99.1 °F (37.3 °C)    Intake/Output Summary (Last 24 hours) at 2025 1150  Last data filed at 2025 0900  Gross per 24 hour   Intake 2457.5 ml   Output 5300 ml   Net -2842.5 ml         Physical:  Well developed, well nourished in no acute distress  Mood indicates no abnormalities. Pt doesn’t appear depressed  Orientated to time and place  Neck is supple, trachea is midline  Respiratory effort is normal  Cardiovascular show no extremity swelling  Abdomen no masses or hernias are palpated, there is no tenderness. Liver and Spleen appear normal.  Skin show no abnormal lesions  Lymph nodes are not palpated in the inguinal, neck, or axillary area.      Labs:  WBC:    Lab Results   Component Value Date/Time    WBC 12.9 2025 04:52 AM     Hemoglobin/Hematocrit:    Lab Results   Component Value Date/Time    HGB 11.6 2025 04:52 AM    HCT 37.5 2025 04:52 AM     BMP:    Lab Results   Component Value Date/Time     2025 04:52 AM    K 4.4 2025 04:52 AM    CL 96 2025 04:52 AM    CO2 22 2025 04:52 AM    BUN 15 2025 04:52 AM    CREATININE 1.0 2025 04:52 AM    CALCIUM 9.1 2025 04:52 AM    LABGLOM 62 2025 04:52 AM    LABGLOM >90 2024 01:49 PM     PT/INR:    Lab Results   Component Value Date/Time    PROTIME 12.1 2025 02:09 PM    INR 0.9 2025 02:09 PM

## 2025-07-20 LAB
MICROORGANISM SPEC CULT: NORMAL
SERVICE CMNT-IMP: NORMAL
SPECIMEN DESCRIPTION: NORMAL

## 2025-07-20 NOTE — PROGRESS NOTES
Patient educated on discharge instructions as well as new medications use, dosage, administration and possible side effects.  Patient verified knowledge. IV removed without difficulty and dry dressing in place. Telemetry monitor removed and returned to [facility]. Pt left facility in stable condition to Home with all of their personal belongings.         Latoya Bahena RN

## 2025-07-21 LAB
MICROORGANISM SPEC CULT: NORMAL
SERVICE CMNT-IMP: NORMAL
SPECIMEN DESCRIPTION: NORMAL

## 2025-08-16 PROBLEM — R79.89 ELEVATED TROPONIN: Status: RESOLVED | Noted: 2025-07-17 | Resolved: 2025-08-16
